# Patient Record
Sex: FEMALE | Race: WHITE | HISPANIC OR LATINO | Employment: FULL TIME | URBAN - METROPOLITAN AREA
[De-identification: names, ages, dates, MRNs, and addresses within clinical notes are randomized per-mention and may not be internally consistent; named-entity substitution may affect disease eponyms.]

---

## 2017-01-12 ENCOUNTER — TELEPHONE (OUTPATIENT)
Dept: INTERNAL MEDICINE | Facility: MEDICAL CENTER | Age: 37
End: 2017-01-12

## 2017-01-12 DIAGNOSIS — M19.90 ARTHRITIS: ICD-10-CM

## 2017-02-16 ENCOUNTER — TELEPHONE (OUTPATIENT)
Dept: INTERNAL MEDICINE | Facility: MEDICAL CENTER | Age: 37
End: 2017-02-16

## 2017-02-16 NOTE — TELEPHONE ENCOUNTER
Rheumatology workup:  I discussed in detail with Dr. Sorenson, the rheumatologist [phone] about patient's complaints and chronic joint pain.  Dr. Sorenson's office will initiate necessary workup, including any genetic testing or further labs or imaging needed.  Dr. Sorenson's  will coordinate with us about ay further plan, and likely referral to [facility].

## 2017-02-17 ENCOUNTER — TELEPHONE (OUTPATIENT)
Dept: INTERNAL MEDICINE | Facility: MEDICAL CENTER | Age: 37
End: 2017-02-17

## 2017-02-17 NOTE — TELEPHONE ENCOUNTER
Patient's case discussed with rheumatologist Dr. wetzel.  Rheumatologist reviewed patient's chart and did not recommend any further rheumatological workup.  I will follow this message to PCP Dr. Adamson.

## 2017-02-21 ENCOUNTER — SLEEP CENTER VISIT (OUTPATIENT)
Dept: SLEEP MEDICINE | Facility: MEDICAL CENTER | Age: 37
End: 2017-02-21
Payer: COMMERCIAL

## 2017-02-21 VITALS
RESPIRATION RATE: 16 BRPM | HEART RATE: 65 BPM | BODY MASS INDEX: 20.4 KG/M2 | DIASTOLIC BLOOD PRESSURE: 86 MMHG | WEIGHT: 130 LBS | HEIGHT: 67 IN | SYSTOLIC BLOOD PRESSURE: 110 MMHG | TEMPERATURE: 97.5 F | OXYGEN SATURATION: 95 %

## 2017-02-21 DIAGNOSIS — Z72.0 TOBACCO ABUSE: ICD-10-CM

## 2017-02-21 DIAGNOSIS — F41.9 ANXIETY: ICD-10-CM

## 2017-02-21 DIAGNOSIS — F51.01 PRIMARY INSOMNIA: ICD-10-CM

## 2017-02-21 DIAGNOSIS — G47.33 OBSTRUCTIVE SLEEP APNEA: ICD-10-CM

## 2017-02-21 PROCEDURE — 99204 OFFICE O/P NEW MOD 45 MIN: CPT | Performed by: INTERNAL MEDICINE

## 2017-02-21 RX ORDER — ALPRAZOLAM 0.25 MG/1
TABLET ORAL
Refills: 2 | COMMUNITY
Start: 2016-12-20 | End: 2017-04-03

## 2017-02-21 NOTE — PATIENT INSTRUCTIONS
"This lady comes in at the request of her primary care doctor for evaluation of a complex sleeping problems that include fibromyalgia prior diagnosis, insomnia, vivid dreams, not awakening refreshed. She uses trazodone to help sleep. She indicates that she sleeps \"bunched up\". She has a prior history of significant alcohol use, up to 15 drinks per day a number of years ago but has stopped that. Presently she does smoke one half pack per day, with slender build I explained that that would interrupt her sleep quality. She awakens in the mornings around 3 or 4 AM with anxiety and depression.    We will look at her sleep pattern, I suspect the only treatable event we might discover with the restless legs, but I strongly urged better sleep hygiene, avoiding tobacco, avoiding alcohol, and with regard to her insomnia she may need more specific intervention. At present she uses trazodone, limits its dosing to prevent excessive daytime sleepiness, but her sleep quality is so poor that she never awakens refreshed. Anxiety and depression seem to be strong components. Leg movements are noted.    We will look at her sleep pattern, determine if there are any components that we can adjust or correct, strongly urged better sleep hygiene, then decide if she needs to see Lolis velazquez, PhD for insomnia   "

## 2017-02-21 NOTE — MR AVS SNAPSHOT
"        Florencia Valdez Sid   2017 2:40 PM   Sleep Center Visit   MRN: [id number]    Department:  [facility]   Dept Phone:  [phone]    Description:  Female : 1980   Provider:  Milly Dey M.D.           Reason for Visit     New Patient           Allergies as of 2017     Allergen Noted Reactions    Codeine 2008   Vomiting    Latex 2008   Rash    Sulfa Drugs 2016         You were diagnosed with     Primary insomnia   [310623]       Obstructive sleep apnea   [915819]       Anxiety   [216620]       Tobacco abuse   [041245]         Vital Signs     Blood Pressure Pulse Temperature Respirations Height Weight    110/86 mmHg 65 36.4 °C (97.5 °F) 16 1.702 m (5' 7.01\") 58.968 kg (130 lb)    Body Mass Index Oxygen Saturation Smoking Status             20.36 kg/m2 95% Current Every Day Smoker         Basic Information     Date Of Birth Sex Race Ethnicity Preferred Language    1980 Female Other Non- English      Your appointments     2017  8:00 PM   Sleep Study Diagnostic with SLEEP TECH   [facility] (--)    [address]  [place] [id number]   [phone]            2017 11:20 AM   Follow UP with [facility] (--)    [address]  [place] [id number]   [phone]              Problem List              ICD-10-CM Priority Class Noted - Resolved    Overdose T50.901A   2016 - Present    Suicidal intent R45.851 High  2016 - Present    Hypokalemia E87.6   2016 - Present    Hypomagnesemia E83.42   2016 - Present      Health Maintenance        Date Due Completion Dates    IMM DTaP/Tdap/Td Vaccine (1 - Tdap) 2000 ---    PAP SMEAR 2002 ---            Current Immunizations     Influenza Vaccine Adult HD 2016      Below and/or attached are the medications your provider expects you to take. Review all of your home medications and " newly ordered medications with your provider and/or pharmacist. Follow medication instructions as directed by your provider and/or pharmacist. Please keep your medication list with you and share with your provider. Update the information when medications are discontinued, doses are changed, or new medications (including over-the-counter products) are added; and carry medication information at all times in the event of emergency situations     Allergies:  CODEINE - Vomiting     LATEX - Rash     SULFA DRUGS - (reactions not documented)               Medications  Valid as of: February 21, 2017 -  3:39 PM    Generic Name Brand Name Tablet Size Instructions for use    Acetaminophen (Tab) TYLENOL 325 MG Take 2 Tabs by mouth every four hours as needed.        Albuterol Sulfate (Aero Soln) albuterol 108 (90 BASE) MCG/ACT Inhale 2 Puffs by mouth every 6 hours as needed for Shortness of Breath.        ALPRAZolam (Tab) XANAX 0.25 MG TAKE 1 TABLET BY MOUTH ONCE A DAY AT BEDTIME AS NEEDED FOR FIBROMYALGIA.        Divalproex Sodium (TABLET SR 24 HR) DEPAKOTE  MG Take 1 Tab by mouth every day.        Famotidine (Tab) PEPCID 20 MG Take 1 Tab by mouth 2 Times a Day.        Fluticasone Propionate (Suspension) FLONASE 50 MCG/ACT Spray 1 Spray in nose every day.        Gabapentin (Cap) NEURONTIN 300 MG Take 1 Cap by mouth 3 times a day.        Ibuprofen (Tab) MOTRIN 800 MG Take 1 Tab by mouth every 8 hours as needed.        Lidocaine (Patch) LIDODERM 5 % Apply 1 Patch to skin as directed every 24 hours.        Ondansetron HCl (Tab) ZOFRAN 4 MG TAKE 1 TAB BY MOUTH EVERY 6 HOURS.        Thiamine HCl (Tab) THIAMINE 100 MG Take 1 Tab by mouth every day.        TiZANidine HCl (Tab) ZANAFLEX 4 MG Take 1 Tab by mouth every 6 hours as needed.        TraZODone HCl (Tab) DESYREL 50 MG TAKE 3 TABS BY MOUTH EVERY BEDTIME.        Venlafaxine HCl (Tab) EFFEXOR 75 MG Take 75 mg by mouth 2 Times a Day.        .                 Medicines  "prescribed today were sent to:     [facility]/PHARMACY #[id number] - [place], [place] - [address]    [address] [place] [address]    Phone: [phone] Fax: [phone]    Open 24 Hours?: No      Medication refill instructions:       If your prescription bottle indicates you have medication refills left, it is not necessary to call your provider’s office. Please contact your pharmacy and they will refill your medication.    If your prescription bottle indicates you do not have any refills left, you may request refills at any time through one of the following ways: The online [employer] system (except Urgent Care), by calling your provider’s office, or by asking your pharmacy to contact your provider’s office with a refill request. Medication refills are processed only during regular business hours and may not be available until the next business day. Your provider may request additional information or to have a follow-up visit with you prior to refilling your medication.   *Please Note: Medication refills are assigned a new Rx number when refilled electronically. Your pharmacy may indicate that no refills were authorized even though a new prescription for the same medication is available at the pharmacy. Please request the medicine by name with the pharmacy before contacting your provider for a refill.        Your To Do List     Future Labs/Procedures Complete By Expires    POLYSOMNOGRAPHY, 4 OR MORE  As directed 2/21/2018      Instructions    This lady comes in at the request of her primary care doctor for evaluation of a complex sleeping problems that include fibromyalgia prior diagnosis, insomnia, vivid dreams, not awakening refreshed. She uses trazodone to help sleep. She indicates that she sleeps \"bunched up\". She has a prior history of significant alcohol use, up to 15 drinks per day a number of years ago but has stopped that. Presently she does smoke one half pack per day, with slender build I explained that that would " interrupt her sleep quality. She awakens in the mornings around 3 or 4 AM with anxiety and depression.    We will look at her sleep pattern, I suspect the only treatable event we might discover with the restless legs, but I strongly urged better sleep hygiene, avoiding tobacco, avoiding alcohol, and with regard to her insomnia she may need more specific intervention. At present she uses trazodone, limits its dosing to prevent excessive daytime sleepiness, but her sleep quality is so poor that she never awakens refreshed. Anxiety and depression seem to be strong components. Leg movements are noted.    We will look at her sleep pattern, determine if there are any components that we can adjust or correct, strongly urged better sleep hygiene, then decide if she needs to see Lolis velazquez, PhD for insomnia           MyChart Status: Patient Declined

## 2017-02-21 NOTE — PROGRESS NOTES
"Florencia Lau is a 37 y.o. female here for poor quality sleep with insomnia and possible leg movement disorder. Patient was referred by her primary care doctor.    History of Present Illness:      This lady comes in at the request of her primary care doctor for evaluation of a complex sleeping problems that include fibromyalgia prior diagnosis, insomnia, vivid dreams, not awakening refreshed. She uses trazodone to help sleep. She indicates that she sleeps \"bunched up\". She has a prior history of significant alcohol use, up to 15 drinks per day a number of years ago but has stopped that. Presently she does smoke one half pack per day, with slender build I explained that that would interrupt her sleep quality. She awakens in the mornings around 3 or 4 AM with anxiety and depression.    We will look at her sleep pattern, I suspect the only treatable event we might discover with the restless legs, but I strongly urged better sleep hygiene, avoiding tobacco, avoiding alcohol, and with regard to her insomnia she may need more specific intervention. At present she uses trazodone, limits its dosing to prevent excessive daytime sleepiness, but her sleep quality is so poor that she never awakens refreshed. Anxiety and depression seem to be strong components. Leg movements are noted.    We will look at her sleep pattern, determine if there are any components that we can adjust or correct, strongly urged better sleep hygiene, then decide if she needs to see Lolis velazquez, PhD for insomnia     Constitutional ROS: No unexpected change in weight, No unexplained fevers, sweats, or chills  Eyes: No change in vision or blurring or double vision  Mouth/Throat ROS: No sore throat, No recent change in voice or hoarseness  Pulmonary ROS: See present history for pertinent positives  Cardiovascular ROS: No chest pain  Gastrointestinal ROS: No abdominal pain, No abdominal bloating or early satiety  Musculoskeletal/Extremities ROS: No " clubbing, No cyanosis; complaints of muscle spasms intermittent  Hematologic/Lymphatic ROS: No abnormal bleeding  Neurologic ROS: No weakness  Psychiatric ROS: Anxiety and depression  Allergic/Immunologic: No rhinitis or urticaria     Current Outpatient Prescriptions   Medication Sig Dispense Refill   • gabapentin (NEURONTIN) 300 MG Cap Take 1 Cap by mouth 3 times a day. 90 Cap 2   • trazodone (DESYREL) 50 MG Tab TAKE 3 TABS BY MOUTH EVERY BEDTIME. 90 Tab 1   • acetaminophen (TYLENOL) 325 MG Tab Take 2 Tabs by mouth every four hours as needed. 240 Tab 2   • divalproex ER (DEPAKOTE ER) 250 MG TABLET SR 24 HR Take 1 Tab by mouth every day. 30 Tab 3   • tizanidine (ZANAFLEX) 4 MG Tab Take 1 Tab by mouth every 6 hours as needed. 30 Tab 3   • ibuprofen (MOTRIN) 800 MG Tab Take 1 Tab by mouth every 8 hours as needed. 30 Tab 3   • famotidine (PEPCID) 20 MG Tab Take 1 Tab by mouth 2 Times a Day. 60 Tab 11   • venlafaxine (EFFEXOR) 75 MG Tab Take 75 mg by mouth 2 Times a Day.     • albuterol 108 (90 BASE) MCG/ACT Aero Soln inhalation aerosol Inhale 2 Puffs by mouth every 6 hours as needed for Shortness of Breath. 8.5 g 3   • alprazolam (XANAX) 0.25 MG Tab TAKE 1 TABLET BY MOUTH ONCE A DAY AT BEDTIME AS NEEDED FOR FIBROMYALGIA.  2   • thiamine (THIAMINE) 100 MG tablet Take 1 Tab by mouth every day. 30 Tab    • ondansetron (ZOFRAN) 4 MG Tab tablet TAKE 1 TAB BY MOUTH EVERY 6 HOURS. 90 Tab 2   • fluticasone (FLONASE) 50 MCG/ACT nasal spray Spray 1 Spray in nose every day.     • lidocaine (LIDODERM) 5 % Patch Apply 1 Patch to skin as directed every 24 hours.       No current facility-administered medications for this visit.       Social History   Substance Use Topics   • Smoking status: Current Every Day Smoker -- 1.00 packs/day     Types: Cigarettes   • Smokeless tobacco: Never Used      Comment: 1 PK per day   • Alcohol Use: No        Past Medical History   Diagnosis Date   • Fibromyalgia    • TMJ syndrome    • Depression    •  "Anxiety    • Panic attacks    • Insomnia    • Neck pain    • Shoulder pain    • Back pain        Past Surgical History   Procedure Laterality Date   • Other       neck abscess   • Tonsillectomy     • Tubal ligation         Allergies: Codeine; Latex; and Sulfa drugs    Family History   Problem Relation Age of Onset   • Diabetes     • Allergies       RA   • Other       DIVERTICULITIS, LUPUS, DDD, FM       Physical Examination    Filed Vitals:    02/21/17 1437   Height: 1.702 m (5' 7.01\")   Weight: 58.968 kg (130 lb)   Weight % change since last entry.: 0 %   BP: 110/86   Pulse: 65   BMI (Calculated): 20.36   Resp: 16   Temp: 36.4 °C (97.5 °F)   Neck circumference: 19       General Appearance: alert, no distress  Skin: Skin color, texture, turgor normal. No rashes or lesions.  Eyes: negative  Oropharynx: Lips, mucosa, and tongue normal. Teeth and gums normal. Oropharynx moist and without lesion  Lungs: positive findings: Clear by exam  Heart: negative. RRR without murmur, gallop, or rubs.  No ectopy.  Abdomen: Abdomen soft, non-tender. BS normal. No masses,  No organomegaly  Extremities: Extremities normal. No deformities, edema, or skin discoloration  Peripheral Pulses: Normal  Neurologic: intact  Oropharynx without pathology    II (soft palate, uvula, fauces visible)    Imaging: None    PFTS: None      Assessment and Plan  1. Primary insomnia  - POLYSOMNOGRAPHY, 4 OR MORE; Future    2. Obstructive sleep apnea  - POLYSOMNOGRAPHY, 4 OR MORE; Future    3. Anxiety    4. Tobacco abuse      This lady comes in at the request of her primary care doctor for evaluation of a complex sleeping problems that include fibromyalgia prior diagnosis, insomnia, vivid dreams, not awakening refreshed. She uses trazodone to help sleep. She indicates that she sleeps \"bunched up\". She has a prior history of significant alcohol use, up to 15 drinks per day a number of years ago but has stopped that. Presently she does smoke one half pack per " day, with slender build I explained that that would interrupt her sleep quality. She awakens in the mornings around 3 or 4 AM with anxiety and depression.    We will look at her sleep pattern, I suspect the only treatable event we might discover with the restless legs, but I strongly urged better sleep hygiene, avoiding tobacco, avoiding alcohol, and with regard to her insomnia she may need more specific intervention. At present she uses trazodone, limits its dosing to prevent excessive daytime sleepiness, but her sleep quality is so poor that she never awakens refreshed. Anxiety and depression seem to be strong components. Leg movements are noted.    We will look at her sleep pattern, determine if there are any components that we can adjust or correct, strongly urged better sleep hygiene, then decide if she needs to see Lolis velazquez, PhD for insomnia   Followup Return in about 6 weeks (around 4/4/2017) for follow up visit with sleep provider, MD.

## 2017-03-27 RX ORDER — TRAZODONE HYDROCHLORIDE 50 MG/1
TABLET ORAL
Qty: 21 TAB | Refills: 0 | Status: SHIPPED | OUTPATIENT
Start: 2017-03-27 | End: 2017-04-03 | Stop reason: SDUPTHER

## 2017-03-27 NOTE — TELEPHONE ENCOUNTER
Last seen: 12/19/116 by Dr. Adamson  Next appt: 04/03/17 with Dr. Adamson    Was the patient seen in the last year in this department? Yes   Does patient have an active prescription for medications requested? No   Received Request Via: Pharmacy

## 2017-03-28 NOTE — TELEPHONE ENCOUNTER
Aracely DALAL M.D.   Severiano Adamson D.O. [facility]   16 hours ago   (5:09 PM)    Will refill for 1 week until pcp evaluation. Per Dr. Dey note, decreased trazadone dose. (Routing comment)

## 2017-04-01 ENCOUNTER — APPOINTMENT (OUTPATIENT)
Dept: SLEEP MEDICINE | Facility: MEDICAL CENTER | Age: 37
End: 2017-04-01
Attending: INTERNAL MEDICINE
Payer: COMMERCIAL

## 2017-04-03 ENCOUNTER — OFFICE VISIT (OUTPATIENT)
Dept: INTERNAL MEDICINE | Facility: MEDICAL CENTER | Age: 37
End: 2017-04-03
Payer: COMMERCIAL

## 2017-04-03 VITALS
HEIGHT: 67 IN | HEART RATE: 72 BPM | TEMPERATURE: 97.4 F | SYSTOLIC BLOOD PRESSURE: 102 MMHG | BODY MASS INDEX: 20.34 KG/M2 | DIASTOLIC BLOOD PRESSURE: 70 MMHG | OXYGEN SATURATION: 98 % | WEIGHT: 129.6 LBS

## 2017-04-03 DIAGNOSIS — T50.902D: ICD-10-CM

## 2017-04-03 DIAGNOSIS — F51.04 PSYCHOPHYSIOLOGICAL INSOMNIA: ICD-10-CM

## 2017-04-03 DIAGNOSIS — M19.90 INFLAMMATORY ARTHRITIS: ICD-10-CM

## 2017-04-03 DIAGNOSIS — G89.4 CHRONIC PAIN SYNDROME: ICD-10-CM

## 2017-04-03 PROCEDURE — 99214 OFFICE O/P EST MOD 30 MIN: CPT | Mod: GC | Performed by: INTERNAL MEDICINE

## 2017-04-03 RX ORDER — TRAZODONE HYDROCHLORIDE 150 MG/1
150 TABLET ORAL NIGHTLY PRN
Qty: 30 TAB | Refills: 1 | Status: SHIPPED | OUTPATIENT
Start: 2017-04-03 | End: 2017-06-26 | Stop reason: SDUPTHER

## 2017-04-03 ASSESSMENT — ENCOUNTER SYMPTOMS
DOUBLE VISION: 0
VOMITING: 0
NAUSEA: 0
SHORTNESS OF BREATH: 0
NERVOUS/ANXIOUS: 1
BLURRED VISION: 1
HEARTBURN: 0
SEIZURES: 0
FEVER: 0
PHOTOPHOBIA: 0
SORE THROAT: 0
MYALGIAS: 1
CHILLS: 0
BRUISES/BLEEDS EASILY: 0
HEADACHES: 1
BACK PAIN: 1
FOCAL WEAKNESS: 0
DEPRESSION: 0
COUGH: 0
PALPITATIONS: 0
WEIGHT LOSS: 0

## 2017-04-03 ASSESSMENT — LIFESTYLE VARIABLES: SUBSTANCE_ABUSE: 0

## 2017-04-03 NOTE — MR AVS SNAPSHOT
"        Florencia Lau   4/3/2017 9:30 AM   Office Visit   MRN: [id number]    Department:  [facility] - Internal Med   Dept Phone:  [phone]    Description:  Female : 1980   Provider:  Severiano Adamson D.O.           Reason for Visit     Follow-Up follow up visit    Orders Needed labs      Allergies as of 4/3/2017     Allergen Noted Reactions    Codeine 2008   Vomiting    Latex 2008   Rash    Sulfa Drugs 2016         You were diagnosed with     Inflammatory arthritis   [742019]       Chronic pain syndrome   [338.4.ICD-9-CM]       Overdose, intentional self-harm, subsequent encounter   [7915964]       Psychophysiological insomnia   [339979]         Vital Signs     Blood Pressure Pulse Temperature Height Weight Body Mass Index    102/70 mmHg 72 36.3 °C (97.4 °F) 1.702 m (5' 7\") 58.786 kg (129 lb 9.6 oz) 20.29 kg/m2    Oxygen Saturation Smoking Status                98% Current Every Day Smoker          Basic Information     Date Of Birth Sex Race Ethnicity Preferred Language    1980 Female Other Non- English      Your appointments     2017 11:20 AM   Follow UP with DANTE Willard   [facility] (--)    [address] [place] [address] [address]  [place] [address] [phone]            2017  9:00 AM   Established Patient with Severiano Adamson D.O.   [facility] - Internal Medicine (--)    [address] [place] [address] [address]  [facility] [address] [phone]           You will be receiving a confirmation call a few days before your appointment from our automated call confirmation system.              Problem List              ICD-10-CM Priority Class Noted - Resolved    Overdose T50.901A   2016 - Present    Suicidal intent R45.851 High  2016 - Present    Hypokalemia E87.6   2016 - Present    Hypomagnesemia E83.42   2016 - Present    Inflammatory arthritis M19.90   4/3/2017 - Present    Chronic pain syndrome G89.4   " 4/3/2017 - Present    Psychophysiological insomnia F51.04   4/3/2017 - Present      Health Maintenance        Date Due Completion Dates    IMM DTaP/Tdap/Td Vaccine (1 - Tdap) 2/11/2000 ---    PAP SMEAR 2/11/2002 ---            Current Immunizations     Influenza Vaccine Adult HD 11/1/2016      Below and/or attached are the medications your provider expects you to take. Review all of your home medications and newly ordered medications with your provider and/or pharmacist. Follow medication instructions as directed by your provider and/or pharmacist. Please keep your medication list with you and share with your provider. Update the information when medications are discontinued, doses are changed, or new medications (including over-the-counter products) are added; and carry medication information at all times in the event of emergency situations     Allergies:  CODEINE - Vomiting     LATEX - Rash     SULFA DRUGS - (reactions not documented)               Medications  Valid as of: April 03, 2017 - 10:35 AM    Generic Name Brand Name Tablet Size Instructions for use    Acetaminophen (Tab) TYLENOL 325 MG Take 2 Tabs by mouth every four hours as needed.        Albuterol Sulfate (Aero Soln) albuterol 108 (90 BASE) MCG/ACT Inhale 2 Puffs by mouth every 6 hours as needed for Shortness of Breath.        ALPRAZolam (Tab) XANAX 0.25 MG TAKE 1 TABLET BY MOUTH ONCE A DAY AT BEDTIME AS NEEDED FOR FIBROMYALGIA.        Divalproex Sodium (TABLET SR 24 HR) DEPAKOTE  MG Take 1 Tab by mouth every day.        Famotidine (Tab) PEPCID 20 MG Take 1 Tab by mouth 2 Times a Day.        Fluticasone Propionate (Suspension) FLONASE 50 MCG/ACT Spray 1 Spray in nose every day.        Gabapentin (Cap) NEURONTIN 300 MG TAKE 1 CAPSULE BY MOUTH 3 TIMES A DAY.        Ibuprofen (Tab) MOTRIN 800 MG Take 1 Tab by mouth every 8 hours as needed.        Lidocaine (Patch) LIDODERM 5 % Apply 1 Patch to skin as directed every 24 hours.        Ondansetron  HCl (Tab) ZOFRAN 4 MG TAKE 1 TAB BY MOUTH EVERY 6 HOURS.        Thiamine HCl (Tab) THIAMINE 100 MG Take 1 Tab by mouth every day.        TiZANidine HCl (Tab) ZANAFLEX 4 MG Take 1 Tab by mouth every 6 hours as needed.        TraZODone HCl (Tab) DESYREL 150 MG Take 1 Tab by mouth at bedtime as needed for Sleep.        Venlafaxine HCl (Tab) EFFEXOR 75 MG Take 75 mg by mouth 2 Times a Day.        .                 Medicines prescribed today were sent to:     [facility]/PHARMACY #[id number] - [place], [place] - [address]    [address] [place] [address]    Phone: [phone] Fax: [phone]    Open 24 Hours?: No      Medication refill instructions:       If your prescription bottle indicates you have medication refills left, it is not necessary to call your provider’s office. Please contact your pharmacy and they will refill your medication.    If your prescription bottle indicates you do not have any refills left, you may request refills at any time through one of the following ways: The online [employer] system (except Urgent Care), by calling your provider’s office, or by asking your pharmacy to contact your provider’s office with a refill request. Medication refills are processed only during regular business hours and may not be available until the next business day. Your provider may request additional information or to have a follow-up visit with you prior to refilling your medication.   *Please Note: Medication refills are assigned a new Rx number when refilled electronically. Your pharmacy may indicate that no refills were authorized even though a new prescription for the same medication is available at the pharmacy. Please request the medicine by name with the pharmacy before contacting your provider for a refill.        Referral     A referral request has been sent to our patient care coordination department. Please allow 3-5 business days for us to process this request and contact you either by phone or mail. If you do not  hear from us by the 5th business day, please call us at [phone].           [employer] Access Code: Activation code not generated  Current [employer] Status: Active          Quit Tobacco Information     Do you want to quit using tobacco?    Quitting tobacco decreases risks of cancer, heart and lung disease, increases life expectancy, improves sense of taste and smell, and increases spending money, among other benefits.    If you are thinking about quitting, we can help.  • [facility] Quit Tobacco Program: 845.254.7703  o Program occurs weekly for four weeks and includes pharmacist consultation on products to support quitting smoking or chewing tobacco. A provider referral is needed for pharmacist consultation.  • Tobacco Users Help Hotline: 8-800QUIT-NOW (815-1941) or https://[place].quitlogix.org/  o Free, confidential telephone and online coaching for [place] residents. Sessions are designed on a schedule that is convenient for you. Eligible clients receive free nicotine replacement therapy.  • Nationally: www.smokefree.gov  o Information and professional assistance to support both immediate and long-term needs as you become, and remain, a non-smoker. Smokefree.gov allows you to choose the help that best fits your needs.

## 2017-04-04 NOTE — PATIENT INSTRUCTIONS
Insomnia  Insomnia is a sleep disorder that makes it difficult to fall asleep or to stay asleep. Insomnia can cause tiredness (fatigue), low energy, difficulty concentrating, mood swings, and poor performance at work or school.   There are three different ways to classify insomnia:   · Difficulty falling asleep.  · Difficulty staying asleep.  · Waking up too early in the morning.  Any type of insomnia can be long-term (chronic) or short-term (acute). Both are common. Short-term insomnia usually lasts for three months or less. Chronic insomnia occurs at least three times a week for longer than three months.  CAUSES   Insomnia may be caused by another condition, situation, or substance, such as:  · Anxiety.  · Certain medicines.  · Gastroesophageal reflux disease (GERD) or other gastrointestinal conditions.  · Asthma or other breathing conditions.  · Restless legs syndrome, sleep apnea, or other sleep disorders.  · Chronic pain.  · Menopause. This may include hot flashes.  · Stroke.  · Abuse of alcohol, tobacco, or illegal drugs.  · Depression.  · Caffeine.    · Neurological disorders, such as Alzheimer disease.  · An overactive thyroid (hyperthyroidism).  The cause of insomnia may not be known.  RISK FACTORS  Risk factors for insomnia include:  · Gender. Women are more commonly affected than men.  · Age. Insomnia is more common as you get older.  · Stress. This may involve your professional or personal life.  · Income. Insomnia is more common in people with lower income.  · Lack of exercise.    · Irregular work schedule or night shifts.  · Travelling between different time zones.  SIGNS AND SYMPTOMS  If you have insomnia, trouble falling asleep or trouble staying asleep is the main symptom. This may lead to other symptoms, such as:  · Feeling fatigued.  · Feeling nervous about going to sleep.  · Not feeling rested in the morning.  · Having trouble concentrating.  · Feeling irritable, anxious, or depressed.  TREATMENT    Treatment for insomnia depends on the cause. If your insomnia is caused by an underlying condition, treatment will focus on addressing the condition. Treatment may also include:   · Medicines to help you sleep.  · Counseling or therapy.  · Lifestyle adjustments.  HOME CARE INSTRUCTIONS   · Take medicines only as directed by your health care provider.  · Keep regular sleeping and waking hours. Avoid naps.  · Keep a sleep diary to help you and your health care provider figure out what could be causing your insomnia. Include:    ¨ When you sleep.  ¨ When you wake up during the night.  ¨ How well you sleep.    ¨ How rested you feel the next day.  ¨ Any side effects of medicines you are taking.  ¨ What you eat and drink.    · Make your bedroom a comfortable place where it is easy to fall asleep:  ¨ Put up shades or special blackout curtains to block light from outside.  ¨ Use a white noise machine to block noise.  ¨ Keep the temperature cool.    · Exercise regularly as directed by your health care provider. Avoid exercising right before bedtime.  · Use relaxation techniques to manage stress. Ask your health care provider to suggest some techniques that may work well for you. These may include:  ¨ Breathing exercises.  ¨ Routines to release muscle tension.  ¨ Visualizing peaceful scenes.  · Cut back on alcohol, caffeinated beverages, and cigarettes, especially close to bedtime. These can disrupt your sleep.  · Do not overeat or eat spicy foods right before bedtime. This can lead to digestive discomfort that can make it hard for you to sleep.  · Limit screen use before bedtime. This includes:  ¨ Watching TV.  ¨ Using your smartphone, tablet, and computer.  · Stick to a routine. This can help you fall asleep faster. Try to do a quiet activity, brush your teeth, and go to bed at the same time each night.  · Get out of bed if you are still awake after 15 minutes of trying to sleep. Keep the lights down, but try reading or  doing a quiet activity. When you feel sleepy, go back to bed.  · Make sure that you drive carefully. Avoid driving if you feel very sleepy.  · Keep all follow-up appointments as directed by your health care provider. This is important.  SEEK MEDICAL CARE IF:   · You are tired throughout the day or have trouble in your daily routine due to sleepiness.  · You continue to have sleep problems or your sleep problems get worse.  SEEK IMMEDIATE MEDICAL CARE IF:   · You have serious thoughts about hurting yourself or someone else.     This information is not intended to replace advice given to you by your health care provider. Make sure you discuss any questions you have with your health care provider.     Document Released: 12/15/2001 Document Revised: 01/08/2016 Document Reviewed: 09/18/2015  Elsevier Interactive Patient Education ©2016 [employer]

## 2017-04-04 NOTE — PROGRESS NOTES
Established Patient    Florencia presents today with the following:    CC: Inflammatory arthritis, chronic pain, recurrent hypertonicity    HPI: This patient is a 37-year-old female with past medical history of chronic pain syndrome, anxiety/depression with previous suicide attempt, recurrent episodic hypertonicity reporting to clinic today for routine follow-up of chronic conditions as follows:    Inflammatory arthritis:  Patient recently referred to rheumatology for further workup, however her referral was declined. The patient believes she may have chronic inflammatory response syndrome, and requests specific labs be drawn for diagnosis (VIP, complement levels, HLA-DRB/DQB. She also states that visual acuity test can be used as partial of the diagnosis, and is currently pursuing an appointment with optometry. She reports limited relief with gabapentin and acetaminophen, however she understands that due to her history of suicide attempt by overdose she is not a candidate for controlled medications. She would like referral to pain management and chiropractic for possible other pain relief modalities.    Psychophysiologic insomnia:  The patient reports complete inability to fall asleep due to anxiety as well as pain from inflammatory arthritis and hypertonic state which is idiopathic. She has been on trazodone 150 mg nightly for many years and requests refill.    Patient Active Problem List    Diagnosis Date Noted   • Suicidal intent 11/06/2016     Priority: High   • Inflammatory arthritis 04/03/2017   • Chronic pain syndrome 04/03/2017   • Psychophysiological insomnia 04/03/2017   • Hypokalemia 11/07/2016   • Hypomagnesemia 11/07/2016   • Overdose 11/06/2016       Current Outpatient Prescriptions   Medication Sig Dispense Refill   • trazodone (DESYREL) 150 MG Tab Take 1 Tab by mouth at bedtime as needed for Sleep. 30 Tab 1   • gabapentin (NEURONTIN) 300 MG Cap TAKE 1 CAPSULE BY MOUTH 3 TIMES A DAY. 90 Cap 1   •  "acetaminophen (TYLENOL) 325 MG Tab Take 2 Tabs by mouth every four hours as needed. 240 Tab 2   • divalproex ER (DEPAKOTE ER) 250 MG TABLET SR 24 HR Take 1 Tab by mouth every day. 30 Tab 3   • tizanidine (ZANAFLEX) 4 MG Tab Take 1 Tab by mouth every 6 hours as needed. 30 Tab 3   • ibuprofen (MOTRIN) 800 MG Tab Take 1 Tab by mouth every 8 hours as needed. 30 Tab 3   • venlafaxine (EFFEXOR) 75 MG Tab Take 75 mg by mouth 2 Times a Day.     • ondansetron (ZOFRAN) 4 MG Tab tablet TAKE 1 TAB BY MOUTH EVERY 6 HOURS. 90 Tab 2   • albuterol 108 (90 BASE) MCG/ACT Aero Soln inhalation aerosol Inhale 2 Puffs by mouth every 6 hours as needed for Shortness of Breath. 8.5 g 3     No current facility-administered medications for this visit.       ROS: As per HPI. Additional pertinent symptoms as noted below.  Review of Systems   Constitutional: Negative for fever, chills and weight loss.   HENT: Negative for sore throat and tinnitus.    Eyes: Positive for blurred vision. Negative for double vision and photophobia.   Respiratory: Negative for cough and shortness of breath.    Cardiovascular: Negative for chest pain, palpitations and leg swelling.   Gastrointestinal: Negative for heartburn, nausea and vomiting.   Genitourinary: Negative for dysuria, urgency and frequency.   Musculoskeletal: Positive for myalgias, back pain and joint pain.   Skin: Negative for itching and rash.   Neurological: Positive for headaches. Negative for focal weakness and seizures.   Endo/Heme/Allergies: Negative for environmental allergies. Does not bruise/bleed easily.   Psychiatric/Behavioral: Negative for depression, suicidal ideas and substance abuse. The patient is nervous/anxious.        /70 mmHg  Pulse 72  Temp(Src) 36.3 °C (97.4 °F)  Ht 1.702 m (5' 7\")  Wt 58.786 kg (129 lb 9.6 oz)  BMI 20.29 kg/m2  SpO2 98%    Physical Exam   Constitutional: She is oriented to person, place, and time and well-developed, well-nourished, and in no " "distress. No distress.   HENT:   Head: Normocephalic and atraumatic.   Right Ear: External ear normal.   Left Ear: External ear normal.   Eyes: Conjunctivae and EOM are normal. Pupils are equal, round, and reactive to light.   Neck: Normal range of motion. Neck supple. No tracheal deviation present. No thyromegaly present.   Cardiovascular: Normal rate, regular rhythm and normal heart sounds.    Pulmonary/Chest: Breath sounds normal. No respiratory distress. She has no wheezes.   Abdominal: Soft. Bowel sounds are normal. She exhibits no distension. There is no tenderness.   Musculoskeletal: Normal range of motion. She exhibits no edema or tenderness.   Range of motion limited by pain   Neurological: She is alert and oriented to person, place, and time. GCS score is 15.   Skin: Skin is warm and dry. She is not diaphoretic.   Psychiatric: Mood, memory, affect and judgment normal.         Assessment and Plan    1. Inflammatory arthritis  2. Chronic pain syndrome  May be attributable to chronic inflammatory response syndrome as described and patient documents. Sedimentation rate and anticardiolipin antibodies previously elevated. Previously referred to rheumatology, however referral was declined as rheumatology felt this patient should be sent to tertiary center ([place]). We have been unable to get her into [place] as of yet.  -We will initiate testing for possible chronic inflammatory response syndrome  -Labs: VIP, HLA DQB/DRB, complement levels  -We will refer to pain management and chiropractic for possible alternate modalities of pain control as the patient has history of overdose and is unable to receive controlled medications from this office.     3. Overdose, intentional self-harm, subsequent encounter  Patient denies depression as well as suicidal ideation currently. She states \"I really hate that part of my life, wish it had never happened\". She is disappointed that she is unable to receive the pain " "medications which she believe she needs, however she understands and is amenable to referral to pain management and chiropractic for alternate modalities.  -I have counseled her extensively on the need to call for help immediately should suicidal ideation be present, she verbalizes understanding and agreement.    4. Psychophysiological insomnia  Patient has been on trazodone 150 mg daily at bedtime for \"many years\", does not believe she can sleep without it. We did refer her for sleep evaluation, however this has not been completed yet. Although trazodone is not a controlled substance, the patient has been counseled that it is sedating and she should use it carefully and not take it every night if possible.  -Refill written      Followup: Return in about 10 weeks (around 6/12/2017) for Long.      Signed by: Severiano Adamson D.O.  "

## 2017-05-19 NOTE — TELEPHONE ENCOUNTER
Last seen: 04/03/17 by Dr. Adamson  Next appt: 06/12/17 with Dr. Adamson    Was the patient seen in the last year in this department? Yes   Does patient have an active prescription for medications requested? No   Received Request Via: Pharmacy

## 2017-05-20 RX ORDER — TIZANIDINE 4 MG/1
TABLET ORAL
Qty: 90 TAB | Refills: 0 | Status: SHIPPED | OUTPATIENT
Start: 2017-05-20 | End: 2017-06-26 | Stop reason: SDUPTHER

## 2017-05-22 RX ORDER — VENLAFAXINE 75 MG/1
TABLET ORAL
Qty: 60 TAB | Refills: 5 | Status: SHIPPED | OUTPATIENT
Start: 2017-05-22 | End: 2018-06-12

## 2017-05-22 NOTE — TELEPHONE ENCOUNTER
Was the patient seen in the last year in this department? Yes    Last seen: 04/03/17 by Dr. Adamson  Next appt: 06/12/17 with Dr. Adamson      Does patient have an active prescription for medications requested? No     Received Request Via: Pharmacy

## 2017-05-30 RX ORDER — DIVALPROEX SODIUM 250 MG/1
TABLET, EXTENDED RELEASE ORAL
Qty: 30 TAB | Refills: 3 | OUTPATIENT
Start: 2017-05-30

## 2017-06-12 ENCOUNTER — APPOINTMENT (OUTPATIENT)
Dept: SLEEP MEDICINE | Facility: MEDICAL CENTER | Age: 37
End: 2017-06-12
Payer: COMMERCIAL

## 2017-06-27 NOTE — TELEPHONE ENCOUNTER
Was the patient seen in the last year in this department? Yes    Last seen: 04/03/17 by Dr. Juan Diego Pruett appt: NONE      Does patient have an active prescription for medications requested? No     Received Request Via: Pharmacy

## 2017-06-28 RX ORDER — DIVALPROEX SODIUM 250 MG/1
TABLET, EXTENDED RELEASE ORAL
Qty: 30 TAB | Refills: 3 | Status: SHIPPED | OUTPATIENT
Start: 2017-06-28 | End: 2017-09-13 | Stop reason: SDUPTHER

## 2017-06-28 RX ORDER — TRAZODONE HYDROCHLORIDE 150 MG/1
TABLET ORAL
Qty: 30 TAB | Refills: 1 | Status: SHIPPED | OUTPATIENT
Start: 2017-06-28 | End: 2018-09-02

## 2017-06-28 RX ORDER — TIZANIDINE 4 MG/1
TABLET ORAL
Qty: 30 TAB | Refills: 3 | Status: SHIPPED | OUTPATIENT
Start: 2017-06-28 | End: 2018-09-02

## 2017-09-06 DIAGNOSIS — M46.90 INFLAMMATION AND STIFFENING OF SPINE (HCC): ICD-10-CM

## 2017-09-06 RX ORDER — IBUPROFEN 800 MG/1
TABLET ORAL
Qty: 30 TAB | Refills: 0 | Status: SHIPPED | OUTPATIENT
Start: 2017-09-06 | End: 2017-10-05 | Stop reason: SDUPTHER

## 2017-09-06 NOTE — TELEPHONE ENCOUNTER
Last seen: 04/03/17 by   Next appt: None    Was the patient seen in the last year in this department? Yes   Does patient have an active prescription for medications requested? No   Received Request Via: Pharmacy

## 2017-09-13 RX ORDER — DIVALPROEX SODIUM 250 MG/1
250 TABLET, EXTENDED RELEASE ORAL
Qty: 30 TAB | Refills: 2 | Status: SHIPPED | OUTPATIENT
Start: 2017-09-13 | End: 2017-09-14 | Stop reason: SDUPTHER

## 2017-09-13 NOTE — TELEPHONE ENCOUNTER
Last seen: 04/03/17 by Dr. Adamson  Next appt: None     Was the patient seen in the last year in this department? Yes   Does patient have an active prescription for medications requested? No   Received Request Via: Pharmacy

## 2017-09-14 RX ORDER — DIVALPROEX SODIUM 250 MG/1
250 TABLET, EXTENDED RELEASE ORAL
Qty: 90 TAB | Refills: 0 | Status: SHIPPED | OUTPATIENT
Start: 2017-09-14 | End: 2018-06-12

## 2017-10-05 DIAGNOSIS — M46.90 INFLAMMATION AND STIFFENING OF SPINE (HCC): ICD-10-CM

## 2017-10-08 RX ORDER — IBUPROFEN 800 MG/1
TABLET ORAL
Qty: 30 TAB | Refills: 0 | Status: SHIPPED | OUTPATIENT
Start: 2017-10-08 | End: 2018-09-02

## 2017-10-26 ENCOUNTER — NON-PROVIDER VISIT (OUTPATIENT)
Dept: URGENT CARE | Facility: CLINIC | Age: 37
End: 2017-10-26

## 2017-10-26 DIAGNOSIS — Z02.1 PRE-EMPLOYMENT DRUG SCREENING: ICD-10-CM

## 2017-10-26 LAB
AMP AMPHETAMINE: NORMAL
COC COCAINE: NORMAL
INT CON NEG: NORMAL
INT CON POS: NORMAL
MET METHAMPHETAMINES: NORMAL
OPI OPIATES: NORMAL
PCP PHENCYCLIDINE: NORMAL
POC DRUG COMMENT 753798-OCCUPATIONAL HEALTH: NEGATIVE
THC: NORMAL

## 2017-10-26 PROCEDURE — 80305 DRUG TEST PRSMV DIR OPT OBS: CPT | Performed by: PHYSICIAN ASSISTANT

## 2017-10-30 ENCOUNTER — HOSPITAL ENCOUNTER (EMERGENCY)
Facility: MEDICAL CENTER | Age: 37
End: 2017-10-31
Attending: EMERGENCY MEDICINE
Payer: MEDICAID

## 2017-10-30 DIAGNOSIS — N30.01 ACUTE CYSTITIS WITH HEMATURIA: ICD-10-CM

## 2017-10-30 LAB
ALBUMIN SERPL BCP-MCNC: 4.5 G/DL (ref 3.2–4.9)
ALBUMIN/GLOB SERPL: 1.6 G/DL
ALP SERPL-CCNC: 53 U/L (ref 30–99)
ALT SERPL-CCNC: 7 U/L (ref 2–50)
ANION GAP SERPL CALC-SCNC: 6 MMOL/L (ref 0–11.9)
APPEARANCE UR: CLEAR
AST SERPL-CCNC: 16 U/L (ref 12–45)
BACTERIA #/AREA URNS HPF: ABNORMAL /HPF
BASOPHILS # BLD AUTO: 0.5 % (ref 0–1.8)
BASOPHILS # BLD: 0.04 K/UL (ref 0–0.12)
BILIRUB SERPL-MCNC: 0.3 MG/DL (ref 0.1–1.5)
BILIRUB UR QL STRIP.AUTO: NEGATIVE
BUN SERPL-MCNC: 12 MG/DL (ref 8–22)
CALCIUM SERPL-MCNC: 8.8 MG/DL (ref 8.5–10.5)
CHLORIDE SERPL-SCNC: 104 MMOL/L (ref 96–112)
CO2 SERPL-SCNC: 24 MMOL/L (ref 20–33)
COLOR UR: YELLOW
CREAT SERPL-MCNC: 0.68 MG/DL (ref 0.5–1.4)
CULTURE IF INDICATED INDCX: YES UA CULTURE
EOSINOPHIL # BLD AUTO: 0.18 K/UL (ref 0–0.51)
EOSINOPHIL NFR BLD: 2.4 % (ref 0–6.9)
EPI CELLS #/AREA URNS HPF: ABNORMAL /HPF
ERYTHROCYTE [DISTWIDTH] IN BLOOD BY AUTOMATED COUNT: 41.4 FL (ref 35.9–50)
GFR SERPL CREATININE-BSD FRML MDRD: >60 ML/MIN/1.73 M 2
GLOBULIN SER CALC-MCNC: 2.8 G/DL (ref 1.9–3.5)
GLUCOSE SERPL-MCNC: 83 MG/DL (ref 65–99)
GLUCOSE UR STRIP.AUTO-MCNC: NEGATIVE MG/DL
HCT VFR BLD AUTO: 37.6 % (ref 37–47)
HGB BLD-MCNC: 12.8 G/DL (ref 12–16)
HYALINE CASTS #/AREA URNS LPF: ABNORMAL /LPF
IMM GRANULOCYTES # BLD AUTO: 0.03 K/UL (ref 0–0.11)
IMM GRANULOCYTES NFR BLD AUTO: 0.4 % (ref 0–0.9)
KETONES UR STRIP.AUTO-MCNC: 15 MG/DL
LEUKOCYTE ESTERASE UR QL STRIP.AUTO: ABNORMAL
LIPASE SERPL-CCNC: 43 U/L (ref 11–82)
LYMPHOCYTES # BLD AUTO: 1.95 K/UL (ref 1–4.8)
LYMPHOCYTES NFR BLD: 25.6 % (ref 22–41)
MCH RBC QN AUTO: 31.5 PG (ref 27–33)
MCHC RBC AUTO-ENTMCNC: 34 G/DL (ref 33.6–35)
MCV RBC AUTO: 92.6 FL (ref 81.4–97.8)
MICRO URNS: ABNORMAL
MONOCYTES # BLD AUTO: 0.5 K/UL (ref 0–0.85)
MONOCYTES NFR BLD AUTO: 6.6 % (ref 0–13.4)
NEUTROPHILS # BLD AUTO: 4.91 K/UL (ref 2–7.15)
NEUTROPHILS NFR BLD: 64.5 % (ref 44–72)
NITRITE UR QL STRIP.AUTO: NEGATIVE
NRBC # BLD AUTO: 0 K/UL
NRBC BLD AUTO-RTO: 0 /100 WBC
PH UR STRIP.AUTO: 7 [PH]
PLATELET # BLD AUTO: 270 K/UL (ref 164–446)
PMV BLD AUTO: 11.1 FL (ref 9–12.9)
POTASSIUM SERPL-SCNC: 4.8 MMOL/L (ref 3.6–5.5)
PROT SERPL-MCNC: 7.3 G/DL (ref 6–8.2)
PROT UR QL STRIP: NEGATIVE MG/DL
RBC # BLD AUTO: 4.06 M/UL (ref 4.2–5.4)
RBC # URNS HPF: ABNORMAL /HPF
RBC UR QL AUTO: NEGATIVE
SODIUM SERPL-SCNC: 134 MMOL/L (ref 135–145)
SP GR UR STRIP.AUTO: 1.02
UROBILINOGEN UR STRIP.AUTO-MCNC: 1 MG/DL
WBC # BLD AUTO: 7.6 K/UL (ref 4.8–10.8)
WBC #/AREA URNS HPF: ABNORMAL /HPF

## 2017-10-30 PROCEDURE — 85025 COMPLETE CBC W/AUTO DIFF WBC: CPT

## 2017-10-30 PROCEDURE — 87086 URINE CULTURE/COLONY COUNT: CPT

## 2017-10-30 PROCEDURE — 700111 HCHG RX REV CODE 636 W/ 250 OVERRIDE (IP): Performed by: EMERGENCY MEDICINE

## 2017-10-30 PROCEDURE — 99284 EMERGENCY DEPT VISIT MOD MDM: CPT

## 2017-10-30 PROCEDURE — 700101 HCHG RX REV CODE 250: Performed by: EMERGENCY MEDICINE

## 2017-10-30 PROCEDURE — 81001 URINALYSIS AUTO W/SCOPE: CPT

## 2017-10-30 PROCEDURE — 96372 THER/PROPH/DIAG INJ SC/IM: CPT

## 2017-10-30 PROCEDURE — 36415 COLL VENOUS BLD VENIPUNCTURE: CPT

## 2017-10-30 PROCEDURE — 700102 HCHG RX REV CODE 250 W/ 637 OVERRIDE(OP): Performed by: EMERGENCY MEDICINE

## 2017-10-30 PROCEDURE — 83690 ASSAY OF LIPASE: CPT

## 2017-10-30 PROCEDURE — A9270 NON-COVERED ITEM OR SERVICE: HCPCS | Performed by: EMERGENCY MEDICINE

## 2017-10-30 PROCEDURE — 80053 COMPREHEN METABOLIC PANEL: CPT

## 2017-10-30 RX ORDER — ENEMA 19; 7 G/133ML; G/133ML
1 ENEMA RECTAL ONCE
Status: COMPLETED | OUTPATIENT
Start: 2017-10-30 | End: 2017-10-30

## 2017-10-30 RX ORDER — KETOROLAC TROMETHAMINE 30 MG/ML
30 INJECTION, SOLUTION INTRAMUSCULAR; INTRAVENOUS ONCE
Status: COMPLETED | OUTPATIENT
Start: 2017-10-30 | End: 2017-10-30

## 2017-10-30 RX ORDER — ONDANSETRON 4 MG/1
4 TABLET, ORALLY DISINTEGRATING ORAL ONCE
Status: COMPLETED | OUTPATIENT
Start: 2017-10-30 | End: 2017-10-30

## 2017-10-30 RX ORDER — DIAZEPAM 2 MG/1
2 TABLET ORAL ONCE
Status: COMPLETED | OUTPATIENT
Start: 2017-10-30 | End: 2017-10-30

## 2017-10-30 RX ADMIN — KETOROLAC TROMETHAMINE 30 MG: 30 INJECTION, SOLUTION INTRAMUSCULAR at 22:50

## 2017-10-30 RX ADMIN — ONDANSETRON 4 MG: 4 TABLET, ORALLY DISINTEGRATING ORAL at 23:07

## 2017-10-30 RX ADMIN — SODIUM PHOSPHATE 133 ML: 7; 19 ENEMA RECTAL at 23:57

## 2017-10-30 RX ADMIN — DIAZEPAM 2 MG: 2 TABLET ORAL at 23:07

## 2017-10-30 ASSESSMENT — PAIN SCALES - GENERAL: PAINLEVEL_OUTOF10: 6

## 2017-10-31 VITALS
WEIGHT: 139.55 LBS | OXYGEN SATURATION: 97 % | SYSTOLIC BLOOD PRESSURE: 110 MMHG | HEIGHT: 67 IN | TEMPERATURE: 98 F | HEART RATE: 84 BPM | BODY MASS INDEX: 21.9 KG/M2 | RESPIRATION RATE: 16 BRPM | DIASTOLIC BLOOD PRESSURE: 69 MMHG

## 2017-10-31 RX ORDER — CEPHALEXIN 250 MG/1
500 CAPSULE ORAL 4 TIMES DAILY
Qty: 24 CAP | Refills: 0 | Status: SHIPPED | OUTPATIENT
Start: 2017-10-31 | End: 2017-11-03

## 2017-10-31 NOTE — ED NOTES
Pt ambulatory to [facility]. Pt changed into gown and placed on monitor.  Agree with triage note. Pt also c/o of bloating and gas. Pt states gas and BM our very foul smelling. Pt states BM are are very small  Chart up and ready for ERP.

## 2017-10-31 NOTE — ED NOTES
Dr Hicks at bedside now for rectal exam. This RN at bedside for assistance. No stool impaction noted upon exam.

## 2017-10-31 NOTE — DISCHARGE INSTRUCTIONS
Urinary Tract Infection  A urinary tract infection (UTI) can occur any place along the urinary tract. The tract includes the kidneys, ureters, bladder, and urethra. A type of germ called bacteria often causes a UTI. UTIs are often helped with antibiotic medicine.   HOME CARE   · If given, take antibiotics as told by your doctor. Finish them even if you start to feel better.  · Drink enough fluids to keep your pee (urine) clear or pale yellow.  · Avoid tea, drinks with caffeine, and bubbly (carbonated) drinks.  · Pee often. Avoid holding your pee in for a long time.  · Pee before and after having sex (intercourse).  · Wipe from front to back after you poop (bowel movement) if you are a woman. Use each tissue only once.  GET HELP RIGHT AWAY IF:   · You have back pain.  · You have lower belly (abdominal) pain.  · You have chills.  · You feel sick to your stomach (nauseous).  · You throw up (vomit).  · Your burning or discomfort with peeing does not go away.  · You have a fever.  · Your symptoms are not better in 3 days.  MAKE SURE YOU:   · Understand these instructions.  · Will watch your condition.  · Will get help right away if you are not doing well or get worse.     This information is not intended to replace advice given to you by your health care provider. Make sure you discuss any questions you have with your health care provider.     Document Released: 06/05/2009 Document Revised: 01/08/2016 Document Reviewed: 07/18/2013  [employer] Interactive Patient Education ©2016 [employer]

## 2017-10-31 NOTE — ED NOTES
Pt discharged home. Explained discharge and medication instructions. Questions and comments addressed. Pt verbalized understanding of instructions. Pt advised to follow-up with GI consultants and PCP or return to ED for any new or worsening of symptoms. Pt is ambulating well and steady on feet. VS stable. Pt ambulatory to ED lobby now.

## 2017-10-31 NOTE — ED PROVIDER NOTES
"ED Provider Note    Scribed for Anthony Hicks M.D. by Rayray Gray. 10/30/2017, 10:23 PM.    Primary care provider: Severiano Adamson D.O.  Means of arrival: Walk-In  History obtained from: Patient  History limited by: None    CHIEF COMPLAINT  Chief Complaint   Patient presents with   • Abdominal Pain     diffuse abdominal pain x 2 weeks. states has digestive problem in a while. describes pain as sharp in nature.   • Flank Pain     bilateral flank.     HPI  Florencia Lau is a 37 y.o. female who presents to the Emergency Department complaining of a \"sharp\" diffuse abdominal pain and bilateral flank pain onset 2 weeks. The patient has been feeling constipated the past few days without major bowel movements. She also says that she has recently had irregular bowel sizes and a very \"raunchy stench\" when passing gas or having a bowel movement. The patient has been trying over-the-counter laxatives such as magnesia without relief of her symptoms. Denies pain medication use. The patient has a history of bowel issues and normally uses magnesia with relief.    REVIEW OF SYSTEMS  Positive constipation, abdominal pain, flank pain. Patient denies fever, vision change, sore throat, chest pain, shortness of breath, rashes, or neurologic deficits.    C.    PAST MEDICAL HISTORY   has a past medical history of Anxiety; Back pain; Depression; Fibromyalgia; Insomnia; Neck pain; Panic attacks; Shoulder pain; and TMJ syndrome.    SURGICAL HISTORY   has a past surgical history that includes other; tonsillectomy; and tubal ligation.    SOCIAL HISTORY  Social History   Substance Use Topics   • Smoking status: Current Every Day Smoker     Packs/day: 1.00     Types: Cigarettes   • Smokeless tobacco: Never Used      Comment: 1 PK per day   • Alcohol use No      History   Drug Use No     FAMILY HISTORY  Family History   Problem Relation Age of Onset   • Diabetes     • Allergies       RA   • Other       DIVERTICULITIS, LUPUS, " "DDD, FM     CURRENT MEDICATIONS  Reviewed.  See Encounter Summary.     ALLERGIES  Allergies   Allergen Reactions   • Codeine Vomiting   • Latex Rash   • Sulfa Drugs      PHYSICAL EXAM  VITAL SIGNS: /69   Pulse 81   Temp 36.7 °C (98 °F)   Resp 16   Ht 1.702 m (5' 7\")   Wt 63.3 kg (139 lb 8.8 oz)   SpO2 99%   BMI 21.86 kg/m²    Pulse ox interpretation: I interpret this pulse ox as normal.  Constitutional: Alert in no acute distress.  HENT: Head normocephalic, atraumatic, Bilateral external ears normal, Nose normal.  Eyes: Pupils are equal, extraocular movements intact, Conjunctiva normal, Non-icteric.   Neck: Normal range of motion, Supple, No stridor.   Lymphatic: No lymphadenopathy noted.   Cardiovascular: Regular rate and rhythm. No rubs, murmurs or gallops.  Thorax & Lungs: No acute respiratory distress, No wheezing, No increased work of breathing. Lungs clear to auscultation.  Abdomen: Soft, Moderate left lower abdominal tenderness, No rebound or guarding  Rectal: No stool found after enema.  Skin: Warm, Dry, No erythema, No rash.   Back: No bony tenderness, No CVA tenderness.   Extremities: Intact distal pulses, No edema, No tenderness, No cyanosis.    Musculoskeletal: Good range of motion in all major joints. No tenderness to palpation or major deformities noted.   Neurologic: Alert , Normal motor function, Normal sensory function, No focal deficits noted.   Psychiatric: Affect normal, Judgment normal, Mood normal.    DIAGNOSTIC STUDIES / PROCEDURES     LABS  Results for orders placed or performed during the hospital encounter of 10/30/17   CBC WITH DIFFERENTIAL   Result Value Ref Range    WBC 7.6 4.8 - 10.8 K/uL    RBC 4.06 (L) 4.20 - 5.40 M/uL    Hemoglobin 12.8 12.0 - 16.0 g/dL    Hematocrit 37.6 37.0 - 47.0 %    MCV 92.6 81.4 - 97.8 fL    MCH 31.5 27.0 - 33.0 pg    MCHC 34.0 33.6 - 35.0 g/dL    RDW 41.4 35.9 - 50.0 fL    Platelet Count 270 164 - 446 K/uL    MPV 11.1 9.0 - 12.9 fL    " Neutrophils-Polys 64.50 44.00 - 72.00 %    Lymphocytes 25.60 22.00 - 41.00 %    Monocytes 6.60 0.00 - 13.40 %    Eosinophils 2.40 0.00 - 6.90 %    Basophils 0.50 0.00 - 1.80 %    Immature Granulocytes 0.40 0.00 - 0.90 %    Nucleated RBC 0.00 /100 WBC    Neutrophils (Absolute) 4.91 2.00 - 7.15 K/uL    Lymphs (Absolute) 1.95 1.00 - 4.80 K/uL    Monos (Absolute) 0.50 0.00 - 0.85 K/uL    Eos (Absolute) 0.18 0.00 - 0.51 K/uL    Baso (Absolute) 0.04 0.00 - 0.12 K/uL    Immature Granulocytes (abs) 0.03 0.00 - 0.11 K/uL    NRBC (Absolute) 0.00 K/uL   COMP METABOLIC PANEL   Result Value Ref Range    Sodium 134 (L) 135 - 145 mmol/L    Potassium 4.8 3.6 - 5.5 mmol/L    Chloride 104 96 - 112 mmol/L    Co2 24 20 - 33 mmol/L    Anion Gap 6.0 0.0 - 11.9    Glucose 83 65 - 99 mg/dL    Bun 12 8 - 22 mg/dL    Creatinine 0.68 0.50 - 1.40 mg/dL    Calcium 8.8 8.5 - 10.5 mg/dL    AST(SGOT) 16 12 - 45 U/L    ALT(SGPT) 7 2 - 50 U/L    Alkaline Phosphatase 53 30 - 99 U/L    Total Bilirubin 0.3 0.1 - 1.5 mg/dL    Albumin 4.5 3.2 - 4.9 g/dL    Total Protein 7.3 6.0 - 8.2 g/dL    Globulin 2.8 1.9 - 3.5 g/dL    A-G Ratio 1.6 g/dL   LIPASE   Result Value Ref Range    Lipase 43 11 - 82 U/L   URINALYSIS,CULTURE IF INDICATED   Result Value Ref Range    Color Yellow     Character Clear     Specific Gravity 1.024 <1.035    Ph 7.0 5.0 - 8.0    Glucose Negative Negative mg/dL    Ketones 15 (A) Negative mg/dL    Protein Negative Negative mg/dL    Bilirubin Negative Negative    Urobilinogen, Urine 1.0 Negative    Nitrite Negative Negative    Leukocyte Esterase Small (A) Negative    Occult Blood Negative Negative    Micro Urine Req Microscopic     Culture Indicated Yes UA Culture   URINE MICROSCOPIC (W/UA)   Result Value Ref Range    WBC 5-10 (A) /hpf    RBC 2-5 (A) /hpf    Bacteria Few (A) None /hpf    Epithelial Cells Few /hpf    Hyaline Cast 0-2 /lpf   ESTIMATED GFR   Result Value Ref Range    GFR If African American >60 >60 mL/min/1.73 m 2    GFR If  Non African American >60 >60 mL/min/1.73 m 2     All labs were reviewed by me.    COURSE & MEDICAL DECISION MAKING  Pertinent Labs & Imaging studies reviewed. (See chart for details)    6:44 PM - Ordered Urine Culture, Estimated GFR, Urine Microscopic, Urinalysis, Lipase, CMP, CBC.    10:23 PM - Patient seen and examined at bedside. I discussed that the patient likely has a fecal impaction and will be treated with an enema to see if that alleviates her symptoms. She also has a UTI which can be treated with antibiotics. Patient will be treated with 30mg Toradol, 133mL Fleet Enema.    11:02 PM - Nurse informed me the patient is anxious about the enema and also would like some anti-nausea medication. Treated with 4mg Zofran, 2mg Valium.    12:14 PM - Nurse informed me the patient had a large bowel movement that was watery.    12:31 PM - Performed rectal exam with nurse chaperone and found no stool in vault. She is feeling better and can be discharged. Patient was given return precautions and she understands and verbalizes agreement.    Decision Making:  This is a 37 y.o. year old female who presents with rectal pain, and some loose bowel movements over the past couple of days. Additionally the patient describes having some sharp abdominal pains, and the patient was concerned that she has acute on chronic constipation. The patient his prescribing symptoms which seemed initially consistent with fecal impaction however on rectal exam the patient does not actually appear to have this. The patient does however have evidence of urinary tract infection on urinalysis, which will be treated with Keflex for the next 3 days. She was instructed to return here for the development of fever, oriented new or worsening symptoms.    The patient will return for new or worsening symptoms and is stable at the time of discharge.    DISPOSITION:  Patient will be discharged home in stable condition.    FOLLOW UP:  Jerome Rivera  M.D.  [address] [place] [address]  [place] [address]  [phone]    Schedule an appointment as soon as possible for a visit      OUTPATIENT MEDICATIONS:  New Prescriptions    CEPHALEXIN (KEFLEX) 250 MG CAP    Take 2 Caps by mouth 4 times a day for 3 days.     FINAL IMPRESSION  1. Acute cystitis with hematuria       Rayray AHUMADA (Scribe), am scribing for, and in the presence of, Anthony Hicks M.D..    Electronically signed by: Rayray Gray (Scribe), 10/30/2017    Anthony AHUMADA M.D. personally performed the services described in this documentation, as scribed by Rayray Gray in my presence, and it is both accurate and complete.    The note accurately reflects work and decisions made by me.  Anthony Hicks  10/31/2017  3:07 AM

## 2017-10-31 NOTE — ED NOTES
Pt had large BM post enema. BM was brown and noted to be sludge-like and watery in appearance. Dr Hicks made aware.

## 2017-11-01 LAB
BACTERIA UR CULT: NORMAL
SIGNIFICANT IND 70042: NORMAL
SITE SITE: NORMAL
SOURCE SOURCE: NORMAL

## 2018-01-17 ENCOUNTER — HOSPITAL ENCOUNTER (EMERGENCY)
Facility: MEDICAL CENTER | Age: 38
End: 2018-01-17
Attending: EMERGENCY MEDICINE

## 2018-01-17 VITALS
DIASTOLIC BLOOD PRESSURE: 98 MMHG | HEART RATE: 96 BPM | TEMPERATURE: 99.1 F | RESPIRATION RATE: 16 BRPM | SYSTOLIC BLOOD PRESSURE: 133 MMHG | OXYGEN SATURATION: 98 %

## 2018-01-17 DIAGNOSIS — J02.9 SORE THROAT: ICD-10-CM

## 2018-01-17 LAB
S PYO AG THROAT QL: NORMAL
SIGNIFICANT IND 70042: NORMAL
SITE SITE: NORMAL
SOURCE SOURCE: NORMAL

## 2018-01-17 PROCEDURE — 87081 CULTURE SCREEN ONLY: CPT | Mod: EDC

## 2018-01-17 PROCEDURE — 99283 EMERGENCY DEPT VISIT LOW MDM: CPT | Mod: EDC

## 2018-01-17 PROCEDURE — 87880 STREP A ASSAY W/OPTIC: CPT | Mod: EDC

## 2018-01-17 PROCEDURE — 700111 HCHG RX REV CODE 636 W/ 250 OVERRIDE (IP): Mod: EDC | Performed by: EMERGENCY MEDICINE

## 2018-01-17 RX ORDER — DEXAMETHASONE SODIUM PHOSPHATE 10 MG/ML
10 INJECTION, SOLUTION INTRAMUSCULAR; INTRAVENOUS ONCE
Status: COMPLETED | OUTPATIENT
Start: 2018-01-17 | End: 2018-01-17

## 2018-01-17 RX ADMIN — DEXAMETHASONE SODIUM PHOSPHATE 10 MG: 10 INJECTION, SOLUTION INTRAMUSCULAR; INTRAVENOUS at 19:34

## 2018-01-17 NOTE — LETTER
Emergency Services     January 17, 2018    Patient: Florencia Lau   YOB: 1980   Date of Visit: 1/17/2018       To Whom It May Concern:    Florencia Lau and Ely Tiffanie were seen and treated in our emergency department on 1/17/2018.    .    Sincerely,     CARMEN CURRAN M.D.  [facility], EMERGENCY DEPT  Dept: [phone]

## 2018-01-18 NOTE — DISCHARGE INSTRUCTIONS
Pharyngitis  Pharyngitis is a sore throat (pharynx). There is redness, pain, and swelling of your throat.  HOME CARE   · Drink enough fluids to keep your pee (urine) clear or pale yellow.  · Only take medicine as told by your doctor.  ¨ You may get sick again if you do not take medicine as told. Finish your medicines, even if you start to feel better.  ¨ Do not take aspirin.  · Rest.  · Rinse your mouth (gargle) with salt water (½ tsp of salt per 1 qt of water) every 1-2 hours. This will help the pain.  · If you are not at risk for choking, you can suck on hard candy or sore throat lozenges.  GET HELP IF:  · You have large, tender lumps on your neck.  · You have a rash.  · You cough up green, yellow-brown, or bloody spit.  GET HELP RIGHT AWAY IF:   · You have a stiff neck.  · You drool or cannot swallow liquids.  · You throw up (vomit) or are not able to keep medicine or liquids down.  · You have very bad pain that does not go away with medicine.  · You have problems breathing (not from a stuffy nose).  MAKE SURE YOU:   · Understand these instructions.  · Will watch your condition.  · Will get help right away if you are not doing well or get worse.     This information is not intended to replace advice given to you by your health care provider. Make sure you discuss any questions you have with your health care provider.     Document Released: 06/05/2009 Document Revised: 10/08/2014 Document Reviewed: 08/25/2014  [employer] Interactive Patient Education ©2016 [employer]

## 2018-01-18 NOTE — ED PROVIDER NOTES
ER Provider Note     Scribed for Andrés Hammer M.D. by Mayo Gaines. 1/17/2018, 7:10 PM.    Primary Care Provider: Jerome Rivera M.D.  Means of Arrival: walk-in   History obtained from: Patient  History limited by: None     CHIEF COMPLAINT  Sore throat    HPI  Florencia Lau is a 37 y.o. female who presents to the Emergency Department for evaluation of sore throat onset 5 days ago. Per patient's, her sore throat has been constant and worsening since onset. Patient endorses associated fever, rhinorrhea, decreased appetite. She reports eating and swallowing exacerbates her pain. The patient confirms a history of tonsillectomy. Patient admits she is an everyday smoker. She confirms recent sick contacts with her daughter, who is sick with similar symptoms. She denies nausea, vomiting, diarrhea, chest pain, shortness of breath.     REVIEW OF SYSTEMS  See HPI for further details.     E.      PAST MEDICAL HISTORY   has a past medical history of Anxiety; Back pain; Depression; Fibromyalgia; Insomnia; Neck pain; Panic attacks; Shoulder pain; and TMJ syndrome.    SURGICAL HISTORY   has a past surgical history that includes other; tonsillectomy; and tubal ligation.    SOCIAL HISTORY  Social History   Substance Use Topics   • Smoking status: Current Every Day Smoker     Packs/day: 1.00     Types: Cigarettes   • Smokeless tobacco: Never Used      Comment: 1 PK per day   • Alcohol use No      History   Drug Use No       FAMILY HISTORY  Family History   Problem Relation Age of Onset   • Diabetes     • Allergies       RA   • Other       DIVERTICULITIS, LUPUS, DDD, FM       CURRENT MEDICATIONS  No current facility-administered medications on file prior to encounter.      Current Outpatient Prescriptions on File Prior to Encounter   Medication Sig Dispense Refill   • ibuprofen (MOTRIN) 800 MG Tab TAKE 1 TABLET BY MOUTH EVERY 8 HOURS AS NEEDED 30 Tab 0   • divalproex ER (DEPAKOTE ER) 250 MG TABLET SR 24 HR Take  1 Tab by mouth every day. 90 Tab 0   • tizanidine (ZANAFLEX) 4 MG Tab TAKE 1 TABLET BY MOUTH EVERY 6 HOURS AS NEEDED 30 Tab 3   • trazodone (DESYREL) 150 MG Tab TAKE 1 TAB BY MOUTH AT BEDTIME AS NEEDED FOR SLEEP. 30 Tab 1   • venlafaxine (EFFEXOR) 75 MG Tab TAKE 1 TABLET BY MOUTH 2 TIMES A DAY 60 Tab 5   • gabapentin (NEURONTIN) 300 MG Cap TAKE 1 CAPSULE BY MOUTH 3 TIMES A DAY. 90 Cap 5   • acetaminophen (TYLENOL) 325 MG Tab Take 2 Tabs by mouth every four hours as needed. 240 Tab 2   • ondansetron (ZOFRAN) 4 MG Tab tablet TAKE 1 TAB BY MOUTH EVERY 6 HOURS. 90 Tab 2   • albuterol 108 (90 BASE) MCG/ACT Aero Soln inhalation aerosol Inhale 2 Puffs by mouth every 6 hours as needed for Shortness of Breath. 8.5 g 3      ALLERGIES  Allergies   Allergen Reactions   • Codeine Vomiting   • Latex Rash   • Sulfa Drugs        PHYSICAL EXAM  VITAL SIGNS: /98   Pulse 96   Temp 37.3 °C (99.1 °F)   Resp 16   SpO2 98%      Constitutional: Alert in no apparent distress.  HENT: Normocephalic, Atraumatic, Bilateral external ears normal. Nose normal. Posterior oropharynx is mildly erythematous. No exudate.   Eyes: Pupils are equal and reactive. Conjunctiva normal, non-icteric.   Heart: Regular rate and rythm, no murmurs.    Lungs: Clear to auscultation bilaterally.  Skin: Warm, Dry, No erythema, No rash.   Neurologic: Alert, Grossly non-focal.   Psychiatric: Affect normal, Judgment normal, Mood normal, Appears appropriate and not intoxicated.     LABS  Results for orders placed or performed during the hospital encounter of 01/17/18   RAPID STREP, CULT IF INDICATED (CULTURE IF NEGATIVE)   Result Value Ref Range    Significant Indicator NEG     Source THRT     Site THROAT     Rapid Strep Screen       Negative for Group A streptococcus.  A negative result may be obtained if the specimen is  inadequate or antigen concentration is below the  sensitivity of the test. This negative test will be followed  up with a culture as  requested.        All labs reviewed by me.      COURSE & MEDICAL DECISION MAKING  Pertinent Labs & Imaging studies reviewed. (See chart for details)    This is a 37 y.o. female that presents with sore throat and a daughter who is positive for strep. There is some redness in the posterior oropharyngeal area. The patient has no stridor and is nontoxic appearing. This could represent strep given her daughter has it or just could be a viral syndrome. There is no meningismus or neck stiffness is suggested deep space infection..     7:10 PM - Patient seen and examined at bedside. Ordered rapid strep.  Patient will be medicated with 10 mg Decadron for her symptoms. I discussed the plan as above with the patient. She understood and verbalized agreement.      8:27 PM  Patient is feeling much improved after the Decadron. She is Negative. I'll discharge her home with strict return precautions as well as follow-up with primary care physician. I told her to take Tylenol and ibuprofen the pain as well.    The patient will return for new or worsening symptoms and is stable at the time of discharge.    The patient is referred to a primary physician for blood pressure management, diabetic screening, and for all other preventative health concerns.      DISPOSITION:  Patient will be discharged home in stable condition.    FOLLOW UP:  Jerome Rivera M.D.  [address] [place] [id number]  [phone]    In 2 days          FINAL IMPRESSION  1. Sore throat          Mayo AHUMADA (Aram), am scribing for, and in the presence of, Andrés Hammer M.D..    Electronically signed by: Mayo Tolbert), 1/17/2018    Andrés AHUMADA M.D. personally performed the services described in this documentation, as scribed by Mayo Gaines in my presence, and it is both accurate and complete.     The note accurately reflects work and decisions made by me.  Andrés Hammer  1/17/2018  8:27 PM

## 2018-01-19 LAB
S PYO SPEC QL CULT: NORMAL
SIGNIFICANT IND 70042: NORMAL
SITE SITE: NORMAL
SOURCE SOURCE: NORMAL

## 2018-06-12 ENCOUNTER — HOSPITAL ENCOUNTER (OUTPATIENT)
Facility: MEDICAL CENTER | Age: 38
End: 2018-06-12
Attending: FAMILY MEDICINE
Payer: COMMERCIAL

## 2018-06-12 DIAGNOSIS — Z01.812 PRE-PROCEDURAL LABORATORY EXAMINATION: ICD-10-CM

## 2018-06-12 LAB
25(OH)D3 SERPL-MCNC: 19 NG/ML (ref 30–100)
ALBUMIN SERPL BCP-MCNC: 4.6 G/DL (ref 3.2–4.9)
ALBUMIN/GLOB SERPL: 1.5 G/DL
ALP SERPL-CCNC: 59 U/L (ref 30–99)
ALT SERPL-CCNC: 17 U/L (ref 2–50)
ANION GAP SERPL CALC-SCNC: 8 MMOL/L (ref 0–11.9)
AST SERPL-CCNC: 22 U/L (ref 12–45)
BASOPHILS # BLD AUTO: 0.5 % (ref 0–1.8)
BASOPHILS # BLD: 0.03 K/UL (ref 0–0.12)
BILIRUB SERPL-MCNC: 0.4 MG/DL (ref 0.1–1.5)
BUN SERPL-MCNC: 10 MG/DL (ref 8–22)
C TRACH DNA SPEC QL NAA+PROBE: NEGATIVE
CALCIUM SERPL-MCNC: 9.7 MG/DL (ref 8.5–10.5)
CHLORIDE SERPL-SCNC: 108 MMOL/L (ref 96–112)
CHOLEST SERPL-MCNC: 193 MG/DL (ref 100–199)
CO2 SERPL-SCNC: 25 MMOL/L (ref 20–33)
CREAT SERPL-MCNC: 0.71 MG/DL (ref 0.5–1.4)
EOSINOPHIL # BLD AUTO: 0.15 K/UL (ref 0–0.51)
EOSINOPHIL NFR BLD: 2.6 % (ref 0–6.9)
ERYTHROCYTE [DISTWIDTH] IN BLOOD BY AUTOMATED COUNT: 40.6 FL (ref 35.9–50)
ERYTHROCYTE [SEDIMENTATION RATE] IN BLOOD BY WESTERGREN METHOD: 27 MM/HOUR (ref 0–20)
GLOBULIN SER CALC-MCNC: 3 G/DL (ref 1.9–3.5)
GLUCOSE SERPL-MCNC: 100 MG/DL (ref 65–99)
HBV SURFACE AG SER QL: NEGATIVE
HCG SERPL QL: NEGATIVE
HCT VFR BLD AUTO: 45.2 % (ref 37–47)
HCV AB SER QL: NEGATIVE
HDLC SERPL-MCNC: 44 MG/DL
HGB BLD-MCNC: 15 G/DL (ref 12–16)
HIV 1+2 AB+HIV1 P24 AG SERPL QL IA: NON REACTIVE
IMM GRANULOCYTES # BLD AUTO: 0.03 K/UL (ref 0–0.11)
IMM GRANULOCYTES NFR BLD AUTO: 0.5 % (ref 0–0.9)
LDLC SERPL CALC-MCNC: 129 MG/DL
LYMPHOCYTES # BLD AUTO: 1.35 K/UL (ref 1–4.8)
LYMPHOCYTES NFR BLD: 23.4 % (ref 22–41)
MAGNESIUM SERPL-MCNC: 2.3 MG/DL (ref 1.5–2.5)
MCH RBC QN AUTO: 29.9 PG (ref 27–33)
MCHC RBC AUTO-ENTMCNC: 33.2 G/DL (ref 33.6–35)
MCV RBC AUTO: 90 FL (ref 81.4–97.8)
MONOCYTES # BLD AUTO: 0.29 K/UL (ref 0–0.85)
MONOCYTES NFR BLD AUTO: 5 % (ref 0–13.4)
N GONORRHOEA DNA SPEC QL NAA+PROBE: NEGATIVE
NEUTROPHILS # BLD AUTO: 3.92 K/UL (ref 2–7.15)
NEUTROPHILS NFR BLD: 68 % (ref 44–72)
NRBC # BLD AUTO: 0 K/UL
NRBC BLD-RTO: 0 /100 WBC
PLATELET # BLD AUTO: 263 K/UL (ref 164–446)
PMV BLD AUTO: 11.7 FL (ref 9–12.9)
POTASSIUM SERPL-SCNC: 3.9 MMOL/L (ref 3.6–5.5)
PROT SERPL-MCNC: 7.6 G/DL (ref 6–8.2)
RBC # BLD AUTO: 5.02 M/UL (ref 4.2–5.4)
RHEUMATOID FACT SER IA-ACNC: <10 IU/ML (ref 0–14)
SODIUM SERPL-SCNC: 141 MMOL/L (ref 135–145)
SPECIMEN SOURCE: NORMAL
T3FREE SERPL-MCNC: 3.12 PG/ML (ref 2.4–4.2)
T4 FREE SERPL-MCNC: 0.74 NG/DL (ref 0.53–1.43)
TREPONEMA PALLIDUM IGG+IGM AB [PRESENCE] IN SERUM OR PLASMA BY IMMUNOASSAY: NON REACTIVE
TRIGL SERPL-MCNC: 102 MG/DL (ref 0–149)
TSH SERPL DL<=0.005 MIU/L-ACNC: 1.15 UIU/ML (ref 0.38–5.33)
URATE SERPL-MCNC: 3.9 MG/DL (ref 1.9–8.2)
VIT B12 SERPL-MCNC: 430 PG/ML (ref 211–911)
WBC # BLD AUTO: 5.8 K/UL (ref 4.8–10.8)

## 2018-06-12 PROCEDURE — 85025 COMPLETE CBC W/AUTO DIFF WBC: CPT

## 2018-06-12 PROCEDURE — 86803 HEPATITIS C AB TEST: CPT

## 2018-06-12 PROCEDURE — 86200 CCP ANTIBODY: CPT

## 2018-06-12 PROCEDURE — 83735 ASSAY OF MAGNESIUM: CPT

## 2018-06-12 PROCEDURE — 80053 COMPREHEN METABOLIC PANEL: CPT

## 2018-06-12 PROCEDURE — 85652 RBC SED RATE AUTOMATED: CPT

## 2018-06-12 PROCEDURE — 82607 VITAMIN B-12: CPT

## 2018-06-12 PROCEDURE — 84443 ASSAY THYROID STIM HORMONE: CPT

## 2018-06-12 PROCEDURE — 36415 COLL VENOUS BLD VENIPUNCTURE: CPT

## 2018-06-12 PROCEDURE — 86694 HERPES SIMPLEX NES ANTBDY: CPT

## 2018-06-12 PROCEDURE — 84481 FREE ASSAY (FT-3): CPT

## 2018-06-12 PROCEDURE — 84703 CHORIONIC GONADOTROPIN ASSAY: CPT

## 2018-06-12 PROCEDURE — 86431 RHEUMATOID FACTOR QUANT: CPT

## 2018-06-12 PROCEDURE — 86038 ANTINUCLEAR ANTIBODIES: CPT

## 2018-06-12 PROCEDURE — 87340 HEPATITIS B SURFACE AG IA: CPT

## 2018-06-12 PROCEDURE — 84439 ASSAY OF FREE THYROXINE: CPT

## 2018-06-12 PROCEDURE — 86780 TREPONEMA PALLIDUM: CPT

## 2018-06-12 PROCEDURE — 80061 LIPID PANEL: CPT

## 2018-06-12 PROCEDURE — 84550 ASSAY OF BLOOD/URIC ACID: CPT

## 2018-06-12 PROCEDURE — 82306 VITAMIN D 25 HYDROXY: CPT

## 2018-06-12 PROCEDURE — 87491 CHLMYD TRACH DNA AMP PROBE: CPT

## 2018-06-12 PROCEDURE — 86162 COMPLEMENT TOTAL (CH50): CPT

## 2018-06-12 PROCEDURE — 87389 HIV-1 AG W/HIV-1&-2 AB AG IA: CPT

## 2018-06-12 PROCEDURE — 87591 N.GONORRHOEAE DNA AMP PROB: CPT

## 2018-06-12 RX ORDER — LAMOTRIGINE 100 MG/1
100 TABLET ORAL DAILY
Status: SHIPPED | COMMUNITY
End: 2022-02-03

## 2018-06-12 RX ORDER — VENLAFAXINE 75 MG/1
150 TABLET ORAL DAILY
COMMUNITY
End: 2018-09-02

## 2018-06-12 RX ORDER — GABAPENTIN 300 MG/1
600 CAPSULE ORAL 4 TIMES DAILY
COMMUNITY
End: 2018-11-29

## 2018-06-13 LAB
CCP IGG SERPL-ACNC: 11 UNITS (ref 0–19)
NUCLEAR IGG SER QL IA: NORMAL

## 2018-06-14 LAB
CH50 SERPL-ACNC: 110 CAE UNITS (ref 60–144)
HSV1+2 IGG SER IA-ACNC: >22.4 IV
HSV1+2 IGM SER IA-ACNC: 1.97 IV

## 2018-06-22 ENCOUNTER — HOSPITAL ENCOUNTER (EMERGENCY)
Facility: MEDICAL CENTER | Age: 38
End: 2018-06-22
Attending: EMERGENCY MEDICINE
Payer: COMMERCIAL

## 2018-06-22 VITALS
SYSTOLIC BLOOD PRESSURE: 120 MMHG | OXYGEN SATURATION: 95 % | RESPIRATION RATE: 18 BRPM | HEIGHT: 67 IN | WEIGHT: 174.38 LBS | HEART RATE: 76 BPM | DIASTOLIC BLOOD PRESSURE: 82 MMHG | TEMPERATURE: 98.6 F | BODY MASS INDEX: 27.37 KG/M2

## 2018-06-22 DIAGNOSIS — M54.2 MUSCULOSKELETAL NECK PAIN: ICD-10-CM

## 2018-06-22 DIAGNOSIS — M43.6 TORTICOLLIS: ICD-10-CM

## 2018-06-22 PROCEDURE — 700111 HCHG RX REV CODE 636 W/ 250 OVERRIDE (IP): Performed by: EMERGENCY MEDICINE

## 2018-06-22 PROCEDURE — 99284 EMERGENCY DEPT VISIT MOD MDM: CPT

## 2018-06-22 PROCEDURE — 96372 THER/PROPH/DIAG INJ SC/IM: CPT

## 2018-06-22 RX ORDER — KETOROLAC TROMETHAMINE 30 MG/ML
60 INJECTION, SOLUTION INTRAMUSCULAR; INTRAVENOUS ONCE
Status: COMPLETED | OUTPATIENT
Start: 2018-06-22 | End: 2018-06-22

## 2018-06-22 RX ORDER — DIAZEPAM 5 MG/1
5 TABLET ORAL EVERY 6 HOURS PRN
Qty: 15 TAB | Refills: 0 | Status: SHIPPED | OUTPATIENT
Start: 2018-06-22 | End: 2018-06-30

## 2018-06-22 RX ADMIN — KETOROLAC TROMETHAMINE 60 MG: 30 INJECTION, SOLUTION INTRAMUSCULAR at 17:33

## 2018-06-22 ASSESSMENT — PAIN SCALES - GENERAL
PAINLEVEL_OUTOF10: 7
PAINLEVEL_OUTOF10: 8

## 2018-06-22 ASSESSMENT — PAIN SCALES - WONG BAKER: WONGBAKER_NUMERICALRESPONSE: DOESN'T HURT AT ALL

## 2018-06-23 ENCOUNTER — PATIENT OUTREACH (OUTPATIENT)
Dept: HEALTH INFORMATION MANAGEMENT | Facility: OTHER | Age: 38
End: 2018-06-23

## 2018-06-23 NOTE — ED NOTES
".  Chief Complaint   Patient presents with   • Shoulder Pain     about 2 weeks ago pt was more active than normal and pt now has persistent pain in her left shoulder and neck.    • Nausea     ./75   Pulse 88   Temp 37 °C (98.6 °F)   Resp 16   Ht 1.702 m (5' 7\")   Wt 79.1 kg (174 lb 6.1 oz)   BMI 27.31 kg/m²     "

## 2018-06-23 NOTE — DISCHARGE INSTRUCTIONS
Acute Torticollis  Torticollis is a condition in which the muscles of the neck tighten (contract) abnormally, causing the neck to twist and the head to move into an unnatural position. Torticollis that develops suddenly is called acute torticollis. If torticollis becomes chronic and is left untreated, the face and neck can become deformed.  CAUSES  This condition may be caused by:  · Sleeping in an awkward position (common).  · Extending or twisting the neck muscles beyond their normal position.  · Infection.  In some cases, the cause may not be known.  SYMPTOMS  Symptoms of this condition include:  · An unnatural position of the head.  · Neck pain.  · A limited ability to move the neck.  · Twisting of the neck to one side.  DIAGNOSIS  This condition is diagnosed with a physical exam. You may also have imaging tests, such as an X-ray, CT scan, or MRI.  TREATMENT  Treatment for this condition involves trying to relax the neck muscles. It may include:  · Medicines or shots.  · Physical therapy.  · Surgery. This may be done in severe cases.  HOME CARE INSTRUCTIONS  · Take medicines only as directed by your health care provider.  · Do stretching exercises and massage your neck as directed by your health care provider.  · Keep all follow-up visits as directed by your health care provider. This is important.  SEEK MEDICAL CARE IF:  · You develop a fever.  SEEK IMMEDIATE MEDICAL CARE IF:  · You develop difficulty breathing.  · You develop noisy breathing (stridor).  · You start drooling.  · You have trouble swallowing or have pain with swallowing.  · You develop numbness or weakness in your hands or feet.  · You have changes in your speech, understanding, or vision.  · Your pain gets worse.  This information is not intended to replace advice given to you by your health care provider. Make sure you discuss any questions you have with your health care provider.  Document Released: 12/15/2001 Document Revised: 04/10/2017  Document Reviewed: 12/14/2015  [employer] Interactive Patient Education © 2017 [employer]

## 2018-06-23 NOTE — PROGRESS NOTES
Patient given DC instructions and verbalized understanding. Patient ambulated out of ED in good condition. '

## 2018-06-23 NOTE — ED PROVIDER NOTES
"ED Provider Note    CHIEF COMPLAINT  Chief Complaint   Patient presents with   • Shoulder Pain     about 2 weeks ago pt was more active than normal and pt now has persistent pain in her left shoulder and neck.    • Nausea       HPI  Florencia Lau is a 38 y.o. female who presents stating that she was moving quite a bit of material a couple of weeks ago off a pallet in the next day she had neck and shoulder discomfort at the trapezius on the left side. She reports that has been painful to sleep and that stretching does not seem to help. The pain is well localized and radiates down the shoulder long term to the arm. She describes a little bit of tingling but no weakness. Denies any trauma, fever, chills, sweats, IVDA or other complaints. She is hoping for a shot of Toradol to relieve her discomfort    ROS  Pertinent negative for fever or trauma    PAST MEDICAL HISTORY  Past Medical History:   Diagnosis Date   • Anxiety    • Anxiety and depression 06/12/2018   • Back pain    • Bowel habit changes 06/12/2018    \"Constipation/Diarrhea\"   • Bronchitis     HX OF   • Depression    • Fibromyalgia    • Hayfever 06/12/2018   • Indigestion 06/12/2018   • Insomnia    • Muscle spasm 06/12/2018    \"My neurologist told me I have a muscle spasm disorder\"   • Neck pain    • Panic attacks    • Shoulder pain    • Stress incontinence 06/12/2018   • TMJ syndrome        FAMILY HISTORY  Family History   Problem Relation Age of Onset   • Diabetes     • Allergies       RA   • Other       DIVERTICULITIS, LUPUS, DDD, FM       SOCIAL HISTORY   reports that she has been smoking Cigarettes.  She has been smoking about 0.50 packs per day. She has never used smokeless tobacco. She reports that she does not drink alcohol or use drugs.    SURGICAL HISTORY  Past Surgical History:   Procedure Laterality Date   • OTHER      neck abscess   • TONSILLECTOMY     • TUBAL LIGATION         CURRENT MEDICATIONS  Home Medications    **Home medications have " "not yet been reviewed for this encounter**         ALLERGIES  Allergies   Allergen Reactions   • Codeine Vomiting   • Latex Rash   • Sulfa Drugs        PHYSICAL EXAM  VITAL SIGNS: /75   Pulse 88   Temp 37 °C (98.6 °F)   Resp 16   Ht 1.702 m (5' 7\")   Wt 79.1 kg (174 lb 6.1 oz)   BMI 27.31 kg/m²    Constitutional: Well developed, Well nourished, No acute distress, Non-toxic appearance.   HENT:   Eyes:   Neck: Spasm noted at the left lower cervical into the left trapezius with trigger points identified.  Skin: Normal  Back: Nontender midline cervical and thoracic spine  Extremities:   Musculoskeletal: Full range of motion of left shoulder and arm  Neurologic: Neurologically intact with normal sensation and strength  Psychiatric:       COURSE & MEDICAL DECISION MAKING  Pertinent Labs & Imaging studies reviewed. (See chart for details)  I gave the patient Toradol 60 mg intramuscular injection for discomfort. I suggest that she stretch aggressively and obtain a mechanical massager as well as possibly seen a chiropractor. I'm willing to help her with Valium as needed for muscle relaxation. She is asking for x-rays to be obtained however I informed her that these are not useful for this type of presentation. Patient is discharged in stable condition somewhat improved after Toradol injection    FINAL IMPRESSION  1. Musculoskeletal neck pain    2. Torticollis            Electronically signed by: Guzman De Oliveira, 6/22/2018 5:46 PM            "

## 2018-06-26 ENCOUNTER — HOSPITAL ENCOUNTER (OUTPATIENT)
Dept: RADIOLOGY | Facility: MEDICAL CENTER | Age: 38
End: 2018-06-26
Attending: FAMILY MEDICINE
Payer: COMMERCIAL

## 2018-06-26 DIAGNOSIS — M54.5 LOW BACK PAIN, UNSPECIFIED BACK PAIN LATERALITY, UNSPECIFIED CHRONICITY, WITH SCIATICA PRESENCE UNSPECIFIED: ICD-10-CM

## 2018-06-26 DIAGNOSIS — M79.9 DISORDER OF SOFT TISSUE: ICD-10-CM

## 2018-06-26 PROCEDURE — 72040 X-RAY EXAM NECK SPINE 2-3 VW: CPT

## 2018-06-26 PROCEDURE — 72072 X-RAY EXAM THORAC SPINE 3VWS: CPT

## 2018-06-26 PROCEDURE — 72100 X-RAY EXAM L-S SPINE 2/3 VWS: CPT

## 2018-06-26 NOTE — H&P
HISTORY OF PRESENT ILLNESS:  The patient is a 38-year-old  with   bothersome stress urinary incontinence who is here for transobturator tape.    This has been a problem for 1 year.  She describes leakage of urine with   coughing, laughing, lifting, running, and sneezing.  Denies painful urination,   difficulty emptying, reduced stream, dribbling or hematuria.    PAST MEDICAL HISTORY:  1.  Depression.  2.  Migraine headache.  3.  Fibromyalgia.    PAST SURGICAL HISTORY:  Tubal ligation, tonsillectomy and adenoidectomy.    FAMILY HISTORY:  Positive for hypertension and type 2 diabetes.    SOCIAL HISTORY:  The patient currently smokes one-half pack per day.  Denies   alcohol abuse.    GYNECOLOGIC HISTORY:  The patient is a G4, P1-0-2-1.    ALLERGIES:  SULFA AND LATEX.    MEDICATIONS:  1.  Effexor XR.  2.  Gabapentin.  3.  Ibuprofen.  4.  Trazodone.  5.  Vitamin D3.    PHYSICAL EXAMINATION:  VITAL SIGNS:  Height 5 feet 7 inches, weight 172 pounds, BMI is 26.9, blood   pressure 100/60, pulse 78, and oxygen saturation is 96% on room air.  GENERAL:  She is well nourished, well developed.  CARDIOVASCULAR:  RRR.  PULMONARY:  CTAB.  ABDOMEN:  Soft and nontender.  GENITOURINARY:  Grade I cystocele.  Normal mucosa.  A postvoid residual of 20   mL of concentrated urine.  Bladder testing was performed.  The patient   demonstrated a significant cough stress test.    ASSESSMENT AND PLAN:  A 38-year-old  with stress urinary incontinence and   a positive stress test, normal postvoid residual and minimal-to-no   significant prolapse, desires transobturator tape procedure, nonsurgical   options were reviewed with the patient, who declines.  The risks and benefits   were reviewed.  Recovery time was discussed.  The patient was congratulated   working on smoking cessation and weight loss.  She understands that these   behavioral changes will improve her outcomes and decrease her risk for   failure.        ____________________________________     MD JEREMY Bermeo / STEPHEN    DD:  06/26/2018 09:10:02  DT:  06/26/2018 09:24:37    D#:  [id number]  Job#:  [id number]

## 2018-06-27 ENCOUNTER — HOSPITAL ENCOUNTER (OUTPATIENT)
Facility: MEDICAL CENTER | Age: 38
End: 2018-06-29
Attending: OBSTETRICS & GYNECOLOGY | Admitting: OBSTETRICS & GYNECOLOGY
Payer: COMMERCIAL

## 2018-06-27 ENCOUNTER — APPOINTMENT (OUTPATIENT)
Dept: RADIOLOGY | Facility: MEDICAL CENTER | Age: 38
End: 2018-06-27
Attending: INTERNAL MEDICINE
Payer: COMMERCIAL

## 2018-06-27 DIAGNOSIS — R29.898 WEAKNESS OF BOTH LOWER EXTREMITIES: ICD-10-CM

## 2018-06-27 DIAGNOSIS — N39.3 STRESS INCONTINENCE: Primary | ICD-10-CM

## 2018-06-27 PROBLEM — F32.A DEPRESSION: Status: ACTIVE | Noted: 2018-06-27

## 2018-06-27 PROBLEM — F33.41 RECURRENT MAJOR DEPRESSIVE DISORDER, IN PARTIAL REMISSION (HCC): Status: ACTIVE | Noted: 2018-06-27

## 2018-06-27 PROBLEM — M79.7 FIBROMYALGIA: Status: ACTIVE | Noted: 2018-06-27

## 2018-06-27 LAB
B-HCG FREE SERPL-ACNC: <5 MIU/ML
IHCGL IHCGL: NEGATIVE MIU/ML

## 2018-06-27 PROCEDURE — 700102 HCHG RX REV CODE 250 W/ 637 OVERRIDE(OP): Performed by: INTERNAL MEDICINE

## 2018-06-27 PROCEDURE — 72156 MRI NECK SPINE W/O & W/DYE: CPT

## 2018-06-27 PROCEDURE — 500892 HCHG PACK, PERI-GYN: Performed by: OBSTETRICS & GYNECOLOGY

## 2018-06-27 PROCEDURE — 110454 HCHG SHELL REV 250: Performed by: OBSTETRICS & GYNECOLOGY

## 2018-06-27 PROCEDURE — 700102 HCHG RX REV CODE 250 W/ 637 OVERRIDE(OP): Performed by: OBSTETRICS & GYNECOLOGY

## 2018-06-27 PROCEDURE — 84702 CHORIONIC GONADOTROPIN TEST: CPT

## 2018-06-27 PROCEDURE — 72157 MRI CHEST SPINE W/O & W/DYE: CPT

## 2018-06-27 PROCEDURE — 72128 CT CHEST SPINE W/O DYE: CPT

## 2018-06-27 PROCEDURE — 72131 CT LUMBAR SPINE W/O DYE: CPT

## 2018-06-27 PROCEDURE — C1771 REP DEV, URINARY, W/SLING: HCPCS | Performed by: OBSTETRICS & GYNECOLOGY

## 2018-06-27 PROCEDURE — 700102 HCHG RX REV CODE 250 W/ 637 OVERRIDE(OP)

## 2018-06-27 PROCEDURE — 160009 HCHG ANES TIME/MIN: Performed by: OBSTETRICS & GYNECOLOGY

## 2018-06-27 PROCEDURE — 302135 SEQUENTIAL COMPRESSION MACHINE: Performed by: OBSTETRICS & GYNECOLOGY

## 2018-06-27 PROCEDURE — G0378 HOSPITAL OBSERVATION PER HR: HCPCS

## 2018-06-27 PROCEDURE — A9270 NON-COVERED ITEM OR SERVICE: HCPCS | Performed by: INTERNAL MEDICINE

## 2018-06-27 PROCEDURE — 160029 HCHG SURGERY MINUTES - 1ST 30 MINS LEVEL 4: Performed by: OBSTETRICS & GYNECOLOGY

## 2018-06-27 PROCEDURE — 99245 OFF/OP CONSLTJ NEW/EST HI 55: CPT | Performed by: INTERNAL MEDICINE

## 2018-06-27 PROCEDURE — 160002 HCHG RECOVERY MINUTES (STAT): Performed by: OBSTETRICS & GYNECOLOGY

## 2018-06-27 PROCEDURE — 306588 SLEEVE,VASO CALF MED: Performed by: OBSTETRICS & GYNECOLOGY

## 2018-06-27 PROCEDURE — 160048 HCHG OR STATISTICAL LEVEL 1-5: Performed by: OBSTETRICS & GYNECOLOGY

## 2018-06-27 PROCEDURE — A4338 INDWELLING CATHETER LATEX: HCPCS | Performed by: OBSTETRICS & GYNECOLOGY

## 2018-06-27 PROCEDURE — 700101 HCHG RX REV CODE 250

## 2018-06-27 PROCEDURE — 96375 TX/PRO/DX INJ NEW DRUG ADDON: CPT

## 2018-06-27 PROCEDURE — 700111 HCHG RX REV CODE 636 W/ 250 OVERRIDE (IP): Performed by: OBSTETRICS & GYNECOLOGY

## 2018-06-27 PROCEDURE — 72158 MRI LUMBAR SPINE W/O & W/DYE: CPT

## 2018-06-27 PROCEDURE — A9270 NON-COVERED ITEM OR SERVICE: HCPCS | Performed by: OBSTETRICS & GYNECOLOGY

## 2018-06-27 PROCEDURE — 160036 HCHG PACU - EA ADDL 30 MINS PHASE I: Performed by: OBSTETRICS & GYNECOLOGY

## 2018-06-27 PROCEDURE — 700111 HCHG RX REV CODE 636 W/ 250 OVERRIDE (IP)

## 2018-06-27 PROCEDURE — 160035 HCHG PACU - 1ST 60 MINS PHASE I: Performed by: OBSTETRICS & GYNECOLOGY

## 2018-06-27 PROCEDURE — 700117 HCHG RX CONTRAST REV CODE 255: Performed by: INTERNAL MEDICINE

## 2018-06-27 PROCEDURE — A9270 NON-COVERED ITEM OR SERVICE: HCPCS

## 2018-06-27 PROCEDURE — 72125 CT NECK SPINE W/O DYE: CPT

## 2018-06-27 PROCEDURE — 72197 MRI PELVIS W/O & W/DYE: CPT

## 2018-06-27 PROCEDURE — 160041 HCHG SURGERY MINUTES - EA ADDL 1 MIN LEVEL 4: Performed by: OBSTETRICS & GYNECOLOGY

## 2018-06-27 PROCEDURE — 501330 HCHG SET, CYSTO IRRIG TUBING: Performed by: OBSTETRICS & GYNECOLOGY

## 2018-06-27 PROCEDURE — 501838 HCHG SUTURE GENERAL: Performed by: OBSTETRICS & GYNECOLOGY

## 2018-06-27 PROCEDURE — 96374 THER/PROPH/DIAG INJ IV PUSH: CPT

## 2018-06-27 PROCEDURE — A9579 GAD-BASE MR CONTRAST NOS,1ML: HCPCS | Performed by: INTERNAL MEDICINE

## 2018-06-27 DEVICE — SLING TOT OBTRYX CURVED: Type: IMPLANTABLE DEVICE | Site: BLADDER | Status: FUNCTIONAL

## 2018-06-27 RX ORDER — TRAZODONE HYDROCHLORIDE 50 MG/1
150 TABLET ORAL
Status: DISCONTINUED | OUTPATIENT
Start: 2018-06-27 | End: 2018-06-29 | Stop reason: HOSPADM

## 2018-06-27 RX ORDER — LIDOCAINE HYDROCHLORIDE AND EPINEPHRINE 10; 10 MG/ML; UG/ML
INJECTION, SOLUTION INFILTRATION; PERINEURAL
Status: DISCONTINUED | OUTPATIENT
Start: 2018-06-27 | End: 2018-06-27 | Stop reason: HOSPADM

## 2018-06-27 RX ORDER — DIAZEPAM 5 MG/1
5 TABLET ORAL EVERY 6 HOURS PRN
Status: DISCONTINUED | OUTPATIENT
Start: 2018-06-27 | End: 2018-06-27

## 2018-06-27 RX ORDER — VENLAFAXINE 75 MG/1
150 TABLET ORAL DAILY
Status: DISCONTINUED | OUTPATIENT
Start: 2018-06-28 | End: 2018-06-27

## 2018-06-27 RX ORDER — KETOROLAC TROMETHAMINE 30 MG/ML
INJECTION, SOLUTION INTRAMUSCULAR; INTRAVENOUS
Status: COMPLETED
Start: 2018-06-27 | End: 2018-06-27

## 2018-06-27 RX ORDER — AMOXICILLIN 250 MG
2 CAPSULE ORAL 2 TIMES DAILY
Status: DISCONTINUED | OUTPATIENT
Start: 2018-06-27 | End: 2018-06-29 | Stop reason: HOSPADM

## 2018-06-27 RX ORDER — OXYCODONE HYDROCHLORIDE AND ACETAMINOPHEN 5; 325 MG/1; MG/1
1-2 TABLET ORAL EVERY 4 HOURS PRN
Status: DISCONTINUED | OUTPATIENT
Start: 2018-06-27 | End: 2018-06-27

## 2018-06-27 RX ORDER — ONDANSETRON 2 MG/ML
4 INJECTION INTRAMUSCULAR; INTRAVENOUS EVERY 4 HOURS PRN
Status: DISCONTINUED | OUTPATIENT
Start: 2018-06-27 | End: 2018-06-29 | Stop reason: HOSPADM

## 2018-06-27 RX ORDER — LORAZEPAM 2 MG/ML
1 INJECTION INTRAMUSCULAR ONCE
Status: COMPLETED | OUTPATIENT
Start: 2018-06-27 | End: 2018-06-27

## 2018-06-27 RX ORDER — ACETAMINOPHEN 325 MG/1
650 TABLET ORAL EVERY 6 HOURS PRN
Status: DISCONTINUED | OUTPATIENT
Start: 2018-06-27 | End: 2018-06-29 | Stop reason: HOSPADM

## 2018-06-27 RX ORDER — SODIUM CHLORIDE, SODIUM LACTATE, POTASSIUM CHLORIDE, CALCIUM CHLORIDE 600; 310; 30; 20 MG/100ML; MG/100ML; MG/100ML; MG/100ML
INJECTION, SOLUTION INTRAVENOUS CONTINUOUS
Status: DISCONTINUED | OUTPATIENT
Start: 2018-06-27 | End: 2018-06-27

## 2018-06-27 RX ORDER — IBUPROFEN 800 MG/1
800 TABLET ORAL EVERY 6 HOURS PRN
Status: DISCONTINUED | OUTPATIENT
Start: 2018-06-27 | End: 2018-06-29 | Stop reason: HOSPADM

## 2018-06-27 RX ORDER — DIAZEPAM 5 MG/1
5 TABLET ORAL EVERY 6 HOURS PRN
Status: DISCONTINUED | OUTPATIENT
Start: 2018-06-27 | End: 2018-06-29 | Stop reason: HOSPADM

## 2018-06-27 RX ORDER — VENLAFAXINE HYDROCHLORIDE 75 MG/1
150 CAPSULE, EXTENDED RELEASE ORAL
Status: DISCONTINUED | OUTPATIENT
Start: 2018-06-28 | End: 2018-06-29 | Stop reason: HOSPADM

## 2018-06-27 RX ORDER — ALBUTEROL SULFATE 90 UG/1
2 AEROSOL, METERED RESPIRATORY (INHALATION) EVERY 6 HOURS PRN
Status: DISCONTINUED | OUTPATIENT
Start: 2018-06-27 | End: 2018-06-29 | Stop reason: HOSPADM

## 2018-06-27 RX ORDER — TIZANIDINE 4 MG/1
4 TABLET ORAL EVERY 6 HOURS PRN
Status: DISCONTINUED | OUTPATIENT
Start: 2018-06-27 | End: 2018-06-29 | Stop reason: HOSPADM

## 2018-06-27 RX ORDER — LIDOCAINE HYDROCHLORIDE AND EPINEPHRINE 10; 10 MG/ML; UG/ML
INJECTION, SOLUTION INFILTRATION; PERINEURAL
Status: DISCONTINUED
Start: 2018-06-27 | End: 2018-06-27

## 2018-06-27 RX ORDER — OXYCODONE HYDROCHLORIDE AND ACETAMINOPHEN 5; 325 MG/1; MG/1
1-2 TABLET ORAL EVERY 6 HOURS PRN
Qty: 20 TAB | Refills: 0 | Status: SHIPPED | OUTPATIENT
Start: 2018-06-27 | End: 2018-06-30

## 2018-06-27 RX ORDER — GABAPENTIN 300 MG/1
300 CAPSULE ORAL 4 TIMES DAILY
Status: DISCONTINUED | OUTPATIENT
Start: 2018-06-27 | End: 2018-06-29 | Stop reason: HOSPADM

## 2018-06-27 RX ORDER — OXYCODONE HCL 5 MG/5 ML
SOLUTION, ORAL ORAL
Status: COMPLETED
Start: 2018-06-27 | End: 2018-06-27

## 2018-06-27 RX ORDER — POLYETHYLENE GLYCOL 3350 17 G/17G
1 POWDER, FOR SOLUTION ORAL
Status: DISCONTINUED | OUTPATIENT
Start: 2018-06-27 | End: 2018-06-29 | Stop reason: HOSPADM

## 2018-06-27 RX ORDER — MORPHINE SULFATE 4 MG/ML
INJECTION, SOLUTION INTRAMUSCULAR; INTRAVENOUS
Status: COMPLETED
Start: 2018-06-27 | End: 2018-06-27

## 2018-06-27 RX ORDER — HYDROCODONE BITARTRATE AND ACETAMINOPHEN 5; 325 MG/1; MG/1
1-2 TABLET ORAL EVERY 6 HOURS PRN
Status: DISCONTINUED | OUTPATIENT
Start: 2018-06-27 | End: 2018-06-28

## 2018-06-27 RX ORDER — ONDANSETRON 4 MG/1
4 TABLET, ORALLY DISINTEGRATING ORAL EVERY 4 HOURS PRN
Status: DISCONTINUED | OUTPATIENT
Start: 2018-06-27 | End: 2018-06-29 | Stop reason: HOSPADM

## 2018-06-27 RX ORDER — ONDANSETRON 2 MG/ML
4 INJECTION INTRAMUSCULAR; INTRAVENOUS EVERY 6 HOURS PRN
Status: DISCONTINUED | OUTPATIENT
Start: 2018-06-27 | End: 2018-06-27

## 2018-06-27 RX ORDER — LABETALOL HYDROCHLORIDE 5 MG/ML
10 INJECTION, SOLUTION INTRAVENOUS EVERY 4 HOURS PRN
Status: DISCONTINUED | OUTPATIENT
Start: 2018-06-27 | End: 2018-06-29

## 2018-06-27 RX ORDER — LAMOTRIGINE 100 MG/1
100 TABLET ORAL DAILY
Status: DISCONTINUED | OUTPATIENT
Start: 2018-06-27 | End: 2018-06-27

## 2018-06-27 RX ORDER — MORPHINE SULFATE 4 MG/ML
4 INJECTION, SOLUTION INTRAMUSCULAR; INTRAVENOUS ONCE
Status: COMPLETED | OUTPATIENT
Start: 2018-06-27 | End: 2018-06-27

## 2018-06-27 RX ORDER — BISACODYL 10 MG
10 SUPPOSITORY, RECTAL RECTAL
Status: DISCONTINUED | OUTPATIENT
Start: 2018-06-27 | End: 2018-06-29 | Stop reason: HOSPADM

## 2018-06-27 RX ORDER — KETOROLAC TROMETHAMINE 30 MG/ML
30 INJECTION, SOLUTION INTRAMUSCULAR; INTRAVENOUS EVERY 6 HOURS PRN
Status: DISCONTINUED | OUTPATIENT
Start: 2018-06-27 | End: 2018-06-27

## 2018-06-27 RX ORDER — GABAPENTIN 300 MG/1
600 CAPSULE ORAL 4 TIMES DAILY
Status: DISCONTINUED | OUTPATIENT
Start: 2018-06-27 | End: 2018-06-27

## 2018-06-27 RX ORDER — LAMOTRIGINE 100 MG/1
100 TABLET ORAL DAILY
Status: DISCONTINUED | OUTPATIENT
Start: 2018-06-28 | End: 2018-06-29 | Stop reason: HOSPADM

## 2018-06-27 RX ADMIN — GABAPENTIN 600 MG: 300 CAPSULE ORAL at 18:31

## 2018-06-27 RX ADMIN — FENTANYL CITRATE 50 MCG: 50 INJECTION, SOLUTION INTRAMUSCULAR; INTRAVENOUS at 10:30

## 2018-06-27 RX ADMIN — SODIUM CHLORIDE, SODIUM LACTATE, POTASSIUM CHLORIDE, CALCIUM CHLORIDE: 600; 310; 30; 20 INJECTION, SOLUTION INTRAVENOUS at 09:00

## 2018-06-27 RX ADMIN — GADODIAMIDE 15 ML: 287 INJECTION INTRAVENOUS at 20:39

## 2018-06-27 RX ADMIN — MORPHINE SULFATE 2 MG: 4 INJECTION INTRAVENOUS at 13:15

## 2018-06-27 RX ADMIN — GABAPENTIN 600 MG: 300 CAPSULE ORAL at 19:36

## 2018-06-27 RX ADMIN — TRAZODONE HYDROCHLORIDE 150 MG: 50 TABLET ORAL at 22:04

## 2018-06-27 RX ADMIN — HYDROCODONE BITARTRATE AND ACETAMINOPHEN 30 ML: 2.5; 108 SOLUTION ORAL at 15:30

## 2018-06-27 RX ADMIN — MORPHINE SULFATE 4 MG: 4 INJECTION INTRAVENOUS at 19:36

## 2018-06-27 RX ADMIN — HYDROCODONE BITARTRATE AND ACETAMINOPHEN 2 TABLET: 5; 325 TABLET ORAL at 22:03

## 2018-06-27 RX ADMIN — MORPHINE SULFATE 2 MG: 4 INJECTION INTRAVENOUS at 15:48

## 2018-06-27 RX ADMIN — KETOROLAC TROMETHAMINE 30 MG: 30 INJECTION, SOLUTION INTRAMUSCULAR at 11:30

## 2018-06-27 RX ADMIN — FENTANYL CITRATE 50 MCG: 50 INJECTION, SOLUTION INTRAMUSCULAR; INTRAVENOUS at 10:20

## 2018-06-27 RX ADMIN — LAMOTRIGINE 100 MG: 100 TABLET ORAL at 18:31

## 2018-06-27 RX ADMIN — TIZANIDINE 4 MG: 4 TABLET ORAL at 22:29

## 2018-06-27 RX ADMIN — KETOROLAC TROMETHAMINE 30 MG: 30 INJECTION, SOLUTION INTRAMUSCULAR at 18:31

## 2018-06-27 RX ADMIN — OXYCODONE HYDROCHLORIDE 10 MG: 5 SOLUTION ORAL at 10:20

## 2018-06-27 RX ADMIN — LORAZEPAM 1 MG: 2 INJECTION INTRAMUSCULAR; INTRAVENOUS at 19:33

## 2018-06-27 ASSESSMENT — PAIN SCALES - GENERAL
PAINLEVEL_OUTOF10: 7
PAINLEVEL_OUTOF10: 9
PAINLEVEL_OUTOF10: 8
PAINLEVEL_OUTOF10: 7
PAINLEVEL_OUTOF10: 9
PAINLEVEL_OUTOF10: 5
PAINLEVEL_OUTOF10: 8
PAINLEVEL_OUTOF10: 8
PAINLEVEL_OUTOF10: 7
PAINLEVEL_OUTOF10: 8
PAINLEVEL_OUTOF10: 9
PAINLEVEL_OUTOF10: 8
PAINLEVEL_OUTOF10: 8
PAINLEVEL_OUTOF10: 9
PAINLEVEL_OUTOF10: 5
PAINLEVEL_OUTOF10: 8
PAINLEVEL_OUTOF10: 9
PAINLEVEL_OUTOF10: 8
PAINLEVEL_OUTOF10: 9
PAINLEVEL_OUTOF10: 9

## 2018-06-27 ASSESSMENT — ENCOUNTER SYMPTOMS
BACK PAIN: 0
NECK PAIN: 0
HEMOPTYSIS: 0
SEIZURES: 0
DIARRHEA: 0
COUGH: 0
DEPRESSION: 1
INSOMNIA: 1
CHILLS: 0
MEMORY LOSS: 0
NERVOUS/ANXIOUS: 0
HEADACHES: 0
LOSS OF CONSCIOUSNESS: 0
TINGLING: 1
DOUBLE VISION: 0
MYALGIAS: 0
HALLUCINATIONS: 0
CLAUDICATION: 0
CONSTIPATION: 0
SENSORY CHANGE: 1
VOMITING: 0
BLOOD IN STOOL: 0
PND: 0
WEAKNESS: 0
PALPITATIONS: 0
HEARTBURN: 0
SPEECH CHANGE: 0
SHORTNESS OF BREATH: 0
ABDOMINAL PAIN: 0
BLURRED VISION: 0
FOCAL WEAKNESS: 1
DIAPHORESIS: 0
WHEEZING: 0
TREMORS: 0
FALLS: 0
DIZZINESS: 0
FEVER: 0
ORTHOPNEA: 0
NAUSEA: 0
SPUTUM PRODUCTION: 0

## 2018-06-27 ASSESSMENT — PATIENT HEALTH QUESTIONNAIRE - PHQ9
2. FEELING DOWN, DEPRESSED, IRRITABLE, OR HOPELESS: NOT AT ALL
1. LITTLE INTEREST OR PLEASURE IN DOING THINGS: NOT AT ALL
SUM OF ALL RESPONSES TO PHQ9 QUESTIONS 1 AND 2: 0

## 2018-06-27 ASSESSMENT — LIFESTYLE VARIABLES
EVER_SMOKED: YES
ALCOHOL_USE: NO
SUBSTANCE_ABUSE: 0

## 2018-06-27 ASSESSMENT — COPD QUESTIONNAIRES
DO YOU EVER COUGH UP ANY MUCUS OR PHLEGM?: NO/ONLY WITH OCCASIONAL COLDS OR INFECTIONS
COPD SCREENING SCORE: 4
HAVE YOU SMOKED AT LEAST 100 CIGARETTES IN YOUR ENTIRE LIFE: YES
DURING THE PAST 4 WEEKS HOW MUCH DID YOU FEEL SHORT OF BREATH: SOME OF THE TIME

## 2018-06-27 NOTE — DISCHARGE INSTRUCTIONS
ACTIVITY: Rest and take it easy for the first 24 hours.  A responsible adult is recommended to remain with you during that time.  It is normal to feel sleepy.  We encourage you to not do anything that requires balance, judgment or coordination.    MILD FLU-LIKE SYMPTOMS ARE NORMAL. YOU MAY EXPERIENCE GENERALIZED MUSCLE ACHES, THROAT IRRITATION, HEADACHE AND/OR SOME NAUSEA.    FOR 24 HOURS DO NOT:  Drive, operate machinery or run household appliances.  Drink beer or alcoholic beverages.   Make important decisions or sign legal documents.    SPECIAL INSTRUCTIONS: Patient Discharge Instructions:  No heavy lifting for 1 week  Pelvic rest (no tampons, no douche, no baths, no intercourse) for 1 week.  Call Dr. Banks office at [phone] for heavy vaginal bleeding or fever > 100.5 Degrees F***    DIET: To avoid nausea, slowly advance diet as tolerated, avoiding spicy or greasy foods for the first day.  Add more substantial food to your diet according to your physician's instructions.  Babies can be fed formula or breast milk as soon as they are hungry.  INCREASE FLUIDS AND FIBER TO AVOID CONSTIPATION.    FOLLOW-UP APPOINTMENT:  A follow-up appointment should be arranged with your doctor; call to schedule.    You should CALL YOUR PHYSICIAN if you develop:  Fever greater than 101 degrees F.  Pain not relieved by medication, or persistent nausea or vomiting.  Excessive bleeding (blood soaking through dressing) or unexpected drainage from the wound.  Extreme redness or swelling around the incision site, drainage of pus or foul smelling drainage.  Inability to urinate or empty your bladder within 8 hours.  Problems with breathing or chest pain.    You should call 911 if you develop problems with breathing or chest pain.  If you are unable to contact your doctor or surgical center, you should go to the nearest emergency room or urgent care center.    Physician's telephone #: **[phone]*    If any questions arise, call  your doctor.  If your doctor is not available, please feel free to call the Surgical Center at [phone].  The Center is open Monday through Friday from 7AM to 7PM.  You can also call the HEALTH HOTLINE open 24 hours/day, 7 days/week and speak to a nurse at [phone], or toll free at [phone].    A registered nurse may call you a few days after your surgery to see how you are doing after your procedure.    MEDICATIONS: Resume taking daily medication.  Take prescribed pain medication with food.  If no medication is prescribed, you may take non-aspirin pain medication if needed.  PAIN MEDICATION CAN BE VERY CONSTIPATING.  Take a stool softener or laxative such as senokot, pericolace, or milk of magnesia if needed.    Prescription given for ***.  Last pain medication given at ***.    If your physician has prescribed pain medication that includes Acetaminophen (Tylenol), do not take additional Acetaminophen (Tylenol) while taking the prescribed medication.    Depression / Suicide Risk    As you are discharged from this [facility] facility, it is important to learn how to keep safe from harming yourself.    Recognize the warning signs:  · Abrupt changes in personality, positive or negative- including increase in energy   · Giving away possessions  · Change in eating patterns- significant weight changes-  positive or negative  · Change in sleeping patterns- unable to sleep or sleeping all the time   · Unwillingness or inability to communicate  · Depression  · Unusual sadness, discouragement and loneliness  · Talk of wanting to die  · Neglect of personal appearance   · Rebelliousness- reckless behavior  · Withdrawal from people/activities they love  · Confusion- inability to concentrate     If you or a loved one observes any of these behaviors or has concerns about self-harm, here's what you can do:  · Talk about it- your feelings and reasons for harming yourself  · Remove any means that you might use to  hurt yourself (examples: pills, rope, extension cords, firearm)  · Get professional help from the community (Mental Health, Substance Abuse, psychological counseling)  · Do not be alone:Call your Safe Contact- someone whom you trust who will be there for you.  · Call your local CRISIS HOTLINE 342-0747 or 249-615-8205  · Call your local Children's Mobile Crisis Response Team [place] (470) 682-5036 or www.[employer]  · Call the toll free National Suicide Prevention Hotlines   · National Suicide Prevention Lifeline 316-684-HHLZ (9168)  · National Hope Line Network 800-SUICIDE (824-2125)

## 2018-06-27 NOTE — OR NURSING
1600 Received report from Brooklyn BOSWELL, assumed care of pt at this time. Pt sleeping, stable. VS WNL. Spoke with Dr. Banks and obtained orders for observation admit.    1630 Report to floor ELBERT Marion, pt meets criteria for discharge from PACU.     1648 Pt to floor with transport.

## 2018-06-27 NOTE — OR NURSING
"1012 Pt transferred to PACU. Report received from OR RN and anesthesia. Pt is awake and alert. Appears to have no distress at this time. VS stable, respirations even and unlabored. Bejarano in place per MD order. Rox-pad in place, CDI.     1020 Pt medicated per MAR for pain 9/10. Pt tolerating sips of water. Pt states her baseline pain is 7/10. Educated on goals for postoperative pain management. Given blanket and heat pads for R hip pain.      1230 Handoff to ELBERT Bryan.     1310 Report back from ELBERT Bryan. Pt complains of more pain on R hip and refuses to sit edge of bed for voiding trial. Upon exam, quad strength equal bilaterally. Pulses 2+ throughout. Pt refuses to lift either leg stating \"it hurts too much\". Pt states \"this same thing has happened to me before while having sex.\"    1320 Dr. Banks called and spoke with patient- will have Dr. Pappas come bedside to examine patient when he is finished with his next surgery.      1350 Dr. Pappas at bedside to examine patient. Per MD order, patient is to be admitted overnight for observation. Keep bejarano overnight per MD.     1530 Pt medicated per MAR for pain 9/10. Pt tolerating sips of ginger ale without complaints of nausea. Repositioned- pt able to straighten leg on command.     1600 Report given to ELBERT Bryan.          "

## 2018-06-27 NOTE — OR NURSING
1240 Received pt from Brooklyn BOSWELL, assumed care of pt at this time. Pt awake, c/o pain 8/10 to right hip. Pt attempted to reposition for comfort.     1255 Attempted to see if pt was able to get out of bed to void. Pt unable to move right leg without 10/10 pain. Unable to complete voiding trial at this time.     1300 Report back to Brooklyn BOSWELL.

## 2018-06-27 NOTE — OP REPORT
DATE OF SERVICE:  06/27/2018    PREOPERATIVE DIAGNOSIS:  Stress urinary incontinence.    POSTOPERATIVE DIAGNOSIS:  Stress urinary incontinence.    PROCEDURE:  Transobturator tape.    SURGEON:  Chapin Banks MD    ANESTHESIOLOGIST:  William Pappas MD    ANESTHESIA TYPE:  General with ET tube.    ESTIMATED BLOOD LOSS:  25 mL.    INDICATIONS:  The patient is a 38-year-old G4, P1 with bothersome stress   urinary incontinence.  It has been a problem for 1 year.  She had a cough   stress test, which was positive.    PROCEDURE IN DETAIL:  The patient was taken to the operating room where her   general anesthesia was found to be adequate.  She was prepped and draped in   the normal sterile fashion and placed in the dorsal lithotomy position in   Tesfaye stirrups.  A Ritter catheter was placed in the vagina and the bladder was   drained for 20 mL of clear urine.  An Allis clamp was placed just inferior to   the urethra and another inferior to the bladder neck.  The vaginal mucosa was   infiltrated with 1% lidocaine with epinephrine along the track towards the   obturator canal.  The same was done on the contralateral side.  The mucosa was   opened with a scalpel and the fascia dissected off the mucosa with Metzenbaum   scissors.  The track was developed on both sides.  The obturator canal was   palpated in the groin and a stab wound was made on each side.  The trocar was   passed through the obturator canal and through the vaginal mucosa on the   patient's left.  No defect was noted in the vaginal mucosa.  The sling was   attached to the trocar and reversed through the track.  The same was done on   the patient's right.  Using a cervical dilator, some traction was applied to   the desired tightness.  The sling was trimmed.  The mucosa was irrigated.  The   Surgifoam was placed to confirm hemostasis.  The mucosa was closed with 2-0   Vicryl.  The skin incisions were closed with 4-0 chromic.  The patient   tolerated the  procedure well and was taken to the PACU in excellent condition.       ____________________________________     MD JEREMY Bermeo / STEPHEN    DD:  06/27/2018 10:43:45  DT:  06/27/2018 10:55:11    D#:  [id number]  Job#:  [id number]

## 2018-06-28 ENCOUNTER — PATIENT OUTREACH (OUTPATIENT)
Dept: HEALTH INFORMATION MANAGEMENT | Facility: OTHER | Age: 38
End: 2018-06-28

## 2018-06-28 LAB
ALBUMIN SERPL BCP-MCNC: 3.7 G/DL (ref 3.2–4.9)
ALBUMIN/GLOB SERPL: 1.5 G/DL
ALP SERPL-CCNC: 53 U/L (ref 30–99)
ALT SERPL-CCNC: 12 U/L (ref 2–50)
ANION GAP SERPL CALC-SCNC: 10 MMOL/L (ref 0–11.9)
AST SERPL-CCNC: 20 U/L (ref 12–45)
BASOPHILS # BLD AUTO: 0.2 % (ref 0–1.8)
BASOPHILS # BLD: 0.02 K/UL (ref 0–0.12)
BILIRUB SERPL-MCNC: 0.3 MG/DL (ref 0.1–1.5)
BUN SERPL-MCNC: 11 MG/DL (ref 8–22)
CALCIUM SERPL-MCNC: 9.2 MG/DL (ref 8.5–10.5)
CHLORIDE SERPL-SCNC: 107 MMOL/L (ref 96–112)
CO2 SERPL-SCNC: 24 MMOL/L (ref 20–33)
CREAT SERPL-MCNC: 0.66 MG/DL (ref 0.5–1.4)
EOSINOPHIL # BLD AUTO: 0.01 K/UL (ref 0–0.51)
EOSINOPHIL NFR BLD: 0.1 % (ref 0–6.9)
ERYTHROCYTE [DISTWIDTH] IN BLOOD BY AUTOMATED COUNT: 39.8 FL (ref 35.9–50)
GLOBULIN SER CALC-MCNC: 2.4 G/DL (ref 1.9–3.5)
GLUCOSE SERPL-MCNC: 112 MG/DL (ref 65–99)
HCT VFR BLD AUTO: 38.1 % (ref 37–47)
HGB BLD-MCNC: 12.7 G/DL (ref 12–16)
IMM GRANULOCYTES # BLD AUTO: 0.04 K/UL (ref 0–0.11)
IMM GRANULOCYTES NFR BLD AUTO: 0.3 % (ref 0–0.9)
LYMPHOCYTES # BLD AUTO: 1.26 K/UL (ref 1–4.8)
LYMPHOCYTES NFR BLD: 10.2 % (ref 22–41)
MAGNESIUM SERPL-MCNC: 2 MG/DL (ref 1.5–2.5)
MCH RBC QN AUTO: 29.8 PG (ref 27–33)
MCHC RBC AUTO-ENTMCNC: 33.3 G/DL (ref 33.6–35)
MCV RBC AUTO: 89.4 FL (ref 81.4–97.8)
MONOCYTES # BLD AUTO: 0.51 K/UL (ref 0–0.85)
MONOCYTES NFR BLD AUTO: 4.1 % (ref 0–13.4)
NEUTROPHILS # BLD AUTO: 10.54 K/UL (ref 2–7.15)
NEUTROPHILS NFR BLD: 85.1 % (ref 44–72)
NRBC # BLD AUTO: 0 K/UL
NRBC BLD-RTO: 0 /100 WBC
PHOSPHATE SERPL-MCNC: 4.2 MG/DL (ref 2.5–4.5)
PLATELET # BLD AUTO: 296 K/UL (ref 164–446)
PMV BLD AUTO: 10.6 FL (ref 9–12.9)
POTASSIUM SERPL-SCNC: 4 MMOL/L (ref 3.6–5.5)
PROT SERPL-MCNC: 6.1 G/DL (ref 6–8.2)
RBC # BLD AUTO: 4.26 M/UL (ref 4.2–5.4)
SODIUM SERPL-SCNC: 141 MMOL/L (ref 135–145)
T3 SERPL-MCNC: 62.2 NG/DL (ref 60–181)
T4 FREE SERPL-MCNC: 0.93 NG/DL (ref 0.53–1.43)
TSH SERPL DL<=0.005 MIU/L-ACNC: 0.27 UIU/ML (ref 0.38–5.33)
WBC # BLD AUTO: 12.4 K/UL (ref 4.8–10.8)

## 2018-06-28 PROCEDURE — 700102 HCHG RX REV CODE 250 W/ 637 OVERRIDE(OP): Performed by: INTERNAL MEDICINE

## 2018-06-28 PROCEDURE — 700102 HCHG RX REV CODE 250 W/ 637 OVERRIDE(OP): Performed by: NURSE PRACTITIONER

## 2018-06-28 PROCEDURE — 96376 TX/PRO/DX INJ SAME DRUG ADON: CPT

## 2018-06-28 PROCEDURE — 85025 COMPLETE CBC W/AUTO DIFF WBC: CPT

## 2018-06-28 PROCEDURE — 97161 PT EVAL LOW COMPLEX 20 MIN: CPT

## 2018-06-28 PROCEDURE — 96372 THER/PROPH/DIAG INJ SC/IM: CPT

## 2018-06-28 PROCEDURE — 700111 HCHG RX REV CODE 636 W/ 250 OVERRIDE (IP): Performed by: OBSTETRICS & GYNECOLOGY

## 2018-06-28 PROCEDURE — 36415 COLL VENOUS BLD VENIPUNCTURE: CPT

## 2018-06-28 PROCEDURE — 84100 ASSAY OF PHOSPHORUS: CPT

## 2018-06-28 PROCEDURE — 700102 HCHG RX REV CODE 250 W/ 637 OVERRIDE(OP): Performed by: OBSTETRICS & GYNECOLOGY

## 2018-06-28 PROCEDURE — A9270 NON-COVERED ITEM OR SERVICE: HCPCS | Performed by: NURSE PRACTITIONER

## 2018-06-28 PROCEDURE — 84439 ASSAY OF FREE THYROXINE: CPT

## 2018-06-28 PROCEDURE — 99233 SBSQ HOSP IP/OBS HIGH 50: CPT | Performed by: INTERNAL MEDICINE

## 2018-06-28 PROCEDURE — G8978 MOBILITY CURRENT STATUS: HCPCS | Mod: CJ

## 2018-06-28 PROCEDURE — 84443 ASSAY THYROID STIM HORMONE: CPT

## 2018-06-28 PROCEDURE — 97166 OT EVAL MOD COMPLEX 45 MIN: CPT

## 2018-06-28 PROCEDURE — 83735 ASSAY OF MAGNESIUM: CPT

## 2018-06-28 PROCEDURE — 80053 COMPREHEN METABOLIC PANEL: CPT

## 2018-06-28 PROCEDURE — 84480 ASSAY TRIIODOTHYRONINE (T3): CPT

## 2018-06-28 PROCEDURE — G8987 SELF CARE CURRENT STATUS: HCPCS | Mod: CJ

## 2018-06-28 PROCEDURE — G0378 HOSPITAL OBSERVATION PER HR: HCPCS

## 2018-06-28 PROCEDURE — G8979 MOBILITY GOAL STATUS: HCPCS | Mod: CI

## 2018-06-28 PROCEDURE — A9270 NON-COVERED ITEM OR SERVICE: HCPCS | Performed by: OBSTETRICS & GYNECOLOGY

## 2018-06-28 PROCEDURE — G8988 SELF CARE GOAL STATUS: HCPCS | Mod: CI

## 2018-06-28 PROCEDURE — 700105 HCHG RX REV CODE 258: Performed by: INTERNAL MEDICINE

## 2018-06-28 PROCEDURE — 700111 HCHG RX REV CODE 636 W/ 250 OVERRIDE (IP): Performed by: INTERNAL MEDICINE

## 2018-06-28 PROCEDURE — A9270 NON-COVERED ITEM OR SERVICE: HCPCS | Performed by: INTERNAL MEDICINE

## 2018-06-28 RX ORDER — HYDROCODONE BITARTRATE AND ACETAMINOPHEN 5; 325 MG/1; MG/1
1 TABLET ORAL EVERY 6 HOURS PRN
Status: DISCONTINUED | OUTPATIENT
Start: 2018-06-28 | End: 2018-06-29 | Stop reason: HOSPADM

## 2018-06-28 RX ORDER — HYDROCODONE BITARTRATE AND ACETAMINOPHEN 5; 325 MG/1; MG/1
1 TABLET ORAL ONCE
Status: COMPLETED | OUTPATIENT
Start: 2018-06-28 | End: 2018-06-28

## 2018-06-28 RX ORDER — KETOROLAC TROMETHAMINE 30 MG/ML
30 INJECTION, SOLUTION INTRAMUSCULAR; INTRAVENOUS EVERY 6 HOURS
Status: DISCONTINUED | OUTPATIENT
Start: 2018-06-28 | End: 2018-06-29

## 2018-06-28 RX ORDER — SODIUM CHLORIDE 9 MG/ML
INJECTION, SOLUTION INTRAVENOUS CONTINUOUS
Status: DISCONTINUED | OUTPATIENT
Start: 2018-06-28 | End: 2018-06-29 | Stop reason: HOSPADM

## 2018-06-28 RX ORDER — NICOTINE 21 MG/24HR
21 PATCH, TRANSDERMAL 24 HOURS TRANSDERMAL
Status: DISCONTINUED | OUTPATIENT
Start: 2018-06-28 | End: 2018-06-29 | Stop reason: HOSPADM

## 2018-06-28 RX ADMIN — HYDROCODONE BITARTRATE AND ACETAMINOPHEN 2 TABLET: 5; 325 TABLET ORAL at 10:14

## 2018-06-28 RX ADMIN — ENOXAPARIN SODIUM 40 MG: 100 INJECTION SUBCUTANEOUS at 08:55

## 2018-06-28 RX ADMIN — KETOROLAC TROMETHAMINE 30 MG: 30 INJECTION, SOLUTION INTRAMUSCULAR at 12:56

## 2018-06-28 RX ADMIN — SODIUM CHLORIDE: 9 INJECTION, SOLUTION INTRAVENOUS at 18:51

## 2018-06-28 RX ADMIN — HYDROCODONE BITARTRATE AND ACETAMINOPHEN 2 TABLET: 5; 325 TABLET ORAL at 03:55

## 2018-06-28 RX ADMIN — HYDROCODONE BITARTRATE AND ACETAMINOPHEN 1 TABLET: 5; 325 TABLET ORAL at 22:10

## 2018-06-28 RX ADMIN — VENLAFAXINE HYDROCHLORIDE 150 MG: 75 CAPSULE, EXTENDED RELEASE ORAL at 08:56

## 2018-06-28 RX ADMIN — KETOROLAC TROMETHAMINE 30 MG: 30 INJECTION, SOLUTION INTRAMUSCULAR at 17:25

## 2018-06-28 RX ADMIN — HYDROCODONE BITARTRATE AND ACETAMINOPHEN 1 TABLET: 5; 325 TABLET ORAL at 12:55

## 2018-06-28 RX ADMIN — VITAMIN D, TAB 1000IU (100/BT) 1000 UNITS: 25 TAB at 08:57

## 2018-06-28 RX ADMIN — HYDROCODONE BITARTRATE AND ACETAMINOPHEN 2 TABLET: 5; 325 TABLET ORAL at 17:23

## 2018-06-28 RX ADMIN — IBUPROFEN 800 MG: 800 TABLET, FILM COATED ORAL at 12:55

## 2018-06-28 RX ADMIN — GABAPENTIN 300 MG: 300 CAPSULE ORAL at 17:23

## 2018-06-28 RX ADMIN — LAMOTRIGINE 100 MG: 100 TABLET ORAL at 08:56

## 2018-06-28 RX ADMIN — GABAPENTIN 300 MG: 300 CAPSULE ORAL at 12:54

## 2018-06-28 RX ADMIN — GABAPENTIN 300 MG: 300 CAPSULE ORAL at 21:37

## 2018-06-28 RX ADMIN — DIAZEPAM 5 MG: 5 TABLET ORAL at 20:21

## 2018-06-28 RX ADMIN — NICOTINE 21 MG: 21 PATCH, EXTENDED RELEASE TRANSDERMAL at 14:35

## 2018-06-28 RX ADMIN — STANDARDIZED SENNA CONCENTRATE AND DOCUSATE SODIUM 2 TABLET: 8.6; 5 TABLET, FILM COATED ORAL at 21:37

## 2018-06-28 RX ADMIN — TRAZODONE HYDROCHLORIDE 150 MG: 50 TABLET ORAL at 23:13

## 2018-06-28 RX ADMIN — IBUPROFEN 800 MG: 800 TABLET, FILM COATED ORAL at 23:14

## 2018-06-28 RX ADMIN — GABAPENTIN 300 MG: 300 CAPSULE ORAL at 08:56

## 2018-06-28 ASSESSMENT — ENCOUNTER SYMPTOMS
MYALGIAS: 0
ORTHOPNEA: 0
SENSORY CHANGE: 1
PALPITATIONS: 0
NECK PAIN: 0
COUGH: 0
LOSS OF CONSCIOUSNESS: 0
FEVER: 0
BLURRED VISION: 0
DIARRHEA: 0
WEIGHT LOSS: 0
HEARTBURN: 0
NERVOUS/ANXIOUS: 1
SPUTUM PRODUCTION: 0
SHORTNESS OF BREATH: 0
PND: 0
BLOOD IN STOOL: 0
SORE THROAT: 0
VOMITING: 0
HEMOPTYSIS: 0
NAUSEA: 0
DIZZINESS: 0
INSOMNIA: 0
ABDOMINAL PAIN: 0
FALLS: 0
HALLUCINATIONS: 0
SEIZURES: 0
SPEECH CHANGE: 0
FOCAL WEAKNESS: 1
WHEEZING: 0
DEPRESSION: 1
MEMORY LOSS: 0
CONSTIPATION: 0
TINGLING: 1
BACK PAIN: 1
CHILLS: 0
DOUBLE VISION: 0
WEAKNESS: 1
FLANK PAIN: 0
STRIDOR: 0
CLAUDICATION: 0
HEADACHES: 0
SINUS PAIN: 0
DIAPHORESIS: 0

## 2018-06-28 ASSESSMENT — PAIN SCALES - GENERAL
PAINLEVEL_OUTOF10: 9
PAINLEVEL_OUTOF10: 8
PAINLEVEL_OUTOF10: 6
PAINLEVEL_OUTOF10: 9
PAINLEVEL_OUTOF10: 7
PAINLEVEL_OUTOF10: ASSUMED PAIN PRESENT
PAINLEVEL_OUTOF10: 9
PAINLEVEL_OUTOF10: 7
PAINLEVEL_OUTOF10: 9

## 2018-06-28 ASSESSMENT — COGNITIVE AND FUNCTIONAL STATUS - GENERAL
CLIMB 3 TO 5 STEPS WITH RAILING: A LOT
MOVING TO AND FROM BED TO CHAIR: UNABLE
MOVING FROM LYING ON BACK TO SITTING ON SIDE OF FLAT BED: UNABLE
WALKING IN HOSPITAL ROOM: A LITTLE
HELP NEEDED FOR BATHING: A LITTLE
STANDING UP FROM CHAIR USING ARMS: A LITTLE
SUGGESTED CMS G CODE MODIFIER DAILY ACTIVITY: CJ
MOBILITY SCORE: 11
SUGGESTED CMS G CODE MODIFIER MOBILITY: CL
TURNING FROM BACK TO SIDE WHILE IN FLAT BAD: UNABLE
DAILY ACTIVITIY SCORE: 21
TOILETING: A LITTLE
DRESSING REGULAR LOWER BODY CLOTHING: A LITTLE

## 2018-06-28 ASSESSMENT — LIFESTYLE VARIABLES: SUBSTANCE_ABUSE: 0

## 2018-06-28 ASSESSMENT — GAIT ASSESSMENTS
DEVIATION: ANTALGIC;DECREASED BASE OF SUPPORT;BRADYKINETIC;SHUFFLED GAIT;DECREASED HEEL STRIKE;DECREASED TOE OFF
ASSISTIVE DEVICE: FRONT WHEEL WALKER
GAIT LEVEL OF ASSIST: MINIMAL ASSIST
DISTANCE (FEET): 10

## 2018-06-28 ASSESSMENT — ACTIVITIES OF DAILY LIVING (ADL): TOILETING: INDEPENDENT

## 2018-06-28 NOTE — DISCHARGE PLANNING
Care Transition Team Assessment    Met with pt at bedside. According to pt she lives with her 8 yr old daughter who is currently with her paternal grandmother. 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 PT/OT eval pending. Pt confirmed if able to return home her Mum can provide transport.  will continue to follow for needs.    Information Source  Orientation : Oriented x 4  Information Given By: Patient  Who is responsible for making decisions for patient? : Patient    Readmission Evaluation  Is this a readmission?: No     Interdisciplinary Discharge Planning  Does Admitting Nurse Feel This Could be a Complex Discharge?:  (unsure, pt now has mobility issues)  Primary Care Physician: Dr Elvira Knutson  Lives with - Patient's Self Care Capacity: Child Less than 18 Years of Age  Patient or legal guardian wants to designate a caregiver (see row info): No  Support Systems: Parent  Housing / Facility: [address] [place] [address]  Do You Take your Prescribed Medications Regularly: Yes  Able to Return to Previous ADL's: Future Time w/Therapy  Mobility Issues: Yes  Prior Services: None  Patient Expects to be Discharged to:: home  Assistance Needed: Unknown at this Time  Durable Medical Equipment:  (None)    Discharge Preparedness  What is your plan after discharge?: Home with help  What are your discharge supports?: Parent  Prior Functional Level: Independent with Activities of Daily Living    Functional Assesment  Prior Functional Level: Independent with Activities of Daily Living    Finances  Prescription Coverage: Yes    Vision / Hearing Impairment  Right Eye Vision: Wears Glasses  Left Eye Vision: Wears Glasses    Values / Beliefs / Concerns  Spiritual Requests During Hospitalization: No     Domestic Abuse  Have you ever been the victim of abuse or violence?: No  Physical Abuse or Sexual Abuse: No  Verbal Abuse or Emotional Abuse: No  Possible Abuse Reported to::  Not Applicable     Discharge Risks or Barriers  Patient risk factors: Other (comment) (Single Mum living with 8 yr old daughter)    Anticipated Discharge Information  Discharge Contact Phone Number: Sister Americo Lau [phone]

## 2018-06-28 NOTE — CARE PLAN
Problem: Pain Management  Goal: Pain level will decrease to patient's comfort goal  Outcome: PROGRESSING AS EXPECTED      Problem: Mobility  Goal: Risk for activity intolerance will decrease  Outcome: PROGRESSING SLOWER THAN EXPECTED

## 2018-06-28 NOTE — PROGRESS NOTES
Patient back from CT before 2200. Assessment complete. Still complaining of right leg numbness/tingling. Pain medication given. NPO at midnight per verbal order from dr. Banks. All questions answered at this time. Patient understands plan of care for shift. Call light within reach.

## 2018-06-28 NOTE — CONSULTS
"CC:  Cant move my hip, terrible pain in right hip since surgery, nobody taking me seriously    Date of Admission: 6/27/2018    Today's Date: 06/27/18    Consulting Physician: Chapin Banks M.D.      HPI:    Florencia Lau is a 38 y.o. female notes TLKW 1000 prior to surgery for bladder sling today, awoke sometime thereafter about 1045 noted right hip pain moderate felt unable to move due to pain but as time passed it worsened now feels as though whole lower body isn't attached due to pain can't bear to budge lower extremities or blinded with pain.  Can feel them however, and can move ankles normally. hasnt had BM yet she states. Bladder state no change.  No other complaints.       ROS:   Constitutional: No fevers or chills.  Eyes: No blurry vision or eye pain.  ENT: No dysphagia or hearing loss.  Respiratory: No cough or shortness of breath.  Cardiovascular: No chest pain or palpitations.  GI: No nausea, vomiting, or diarrhea.  : No urinary incontinence or dysuria.  Musculoskeletal: No joint swelling or arthralgias.  Skin: No skin rashes.  Neuro: See HPI.  Endocrine: No heat or cold intolerance. No polydipsia or polyuria.  Psych: No depression or anxiety.  Heme/Lymph: No easy bruising or swollen lymph nodes.  All other systems were reviewed and were negative.       Past Medical History:   Past Medical History:   Diagnosis Date   • Hayfever 06/12/2018   • Muscle spasm 06/12/2018    \"My neurologist told me I have a muscle spasm disorder\"   • Stress incontinence 06/12/2018   • Indigestion 06/12/2018   • Bowel habit changes 06/12/2018    \"Constipation/Diarrhea\"   • Anxiety and depression 06/12/2018   • Anxiety    • Back pain    • Bronchitis     HX OF   • Depression    • Fibromyalgia    • Insomnia    • Neck pain    • Panic attacks    • Shoulder pain    • TMJ syndrome        Past Surgical History:   Past Surgical History:   Procedure Laterality Date   • BLADDER SUSPENSION  6/27/2018    Procedure: BLADDER " SUSPENSION-   FOR: TRANS OBTURATOR TAPE;  Surgeon: Chapin Banks M.D.;  Location: SURGERY SAME DAY [facility];  Service: Gynecology   • OTHER      neck abscess   • TONSILLECTOMY     • TUBAL LIGATION         Social History:   Social History     Social History   • Marital status: Single     Spouse name: N/A   • Number of children: N/A   • Years of education: N/A     Occupational History   • Not on file.     Social History Main Topics   • Smoking status: Current Every Day Smoker     Packs/day: 0.50     Types: Cigarettes   • Smokeless tobacco: Never Used      Comment: 1 PK per day   • Alcohol use No   • Drug use: No   • Sexual activity: Not on file     Other Topics Concern   • Not on file     Social History Narrative   • No narrative on file       Family History:   Family History   Problem Relation Age of Onset   • Diabetes     • Allergies       RA   • Other       DIVERTICULITIS, LUPUS, DDD, FM       Allergies:   Allergies   Allergen Reactions   • Codeine Vomiting   • Latex Rash   • Sulfa Drugs          Current Facility-Administered Medications:   •  lactated ringers infusion, , Intravenous, Continuous, Chapin Banks M.D., Last Rate: 10 mL/hr at 06/27/18 0900  •  ketorolac (TORADOL) injection 30 mg, 30 mg, Intravenous, Q6HRS PRN, Chapin Banks M.D.  •  ondansetron (ZOFRAN) syringe/vial injection 4 mg, 4 mg, Intravenous, Q6HRS PRN, Chapin Banks M.D.  •  diazePAM (VALIUM) tablet 5 mg, 5 mg, Oral, Q6HRS PRN, Chapin Banks M.D.  •  gabapentin (NEURONTIN) capsule 600 mg, 600 mg, Oral, 4X/DAY, Chapin Banks M.D.  •  lamoTRIgine (LAMICTAL) tablet 100 mg, 100 mg, Oral, DAILY, Chapin Banks M.D.  •  [START ON 6/28/2018] venlafaxine XR (EFFEXOR XR) capsule 150 mg, 150 mg, Oral, QDAY with Breakfast, Chapin Bnaks M.D.      PHYSICAL EXAM    Vitals:    06/27/18 1548 06/27/18 1618 06/27/18 1711 06/27/18 1726   BP:    113/62   Pulse: 79 83  68   Resp: 16 16  16   Temp:    36.3 °C (97.3 °F)   SpO2: 97% 94%   "96%   Weight:   79.1 kg (174 lb 6.1 oz)    Height:   1.702 m (5' 7\")        Head/Neck: NCAT no meningismus neg kernig neg brudzinski. No obvious mass or heard bruit. No tender arteries or lost pulses.  No rash of head or neck.  Skin: Warm, dry, intact. No rashes observed head/neck or body  Eyes/Funduscopic: unable    Mental Status: Awake, alert, oriented x 3. Names/repeats/fluent/follows commands. No neglect/extinction. Attention and concentration, recent & remote memory, Fund of Knowledge wnl.  Cranial Nerves: CN II-XII intact. PERRL.   No afferent pupillary defect. EOM full. VF full. sym grimace & eye closure. No nystagmus.     Motor: bulk/tone wnl. Cant move at hips due to pain right only slightly screams with pain, left can against my hand quite strong but givesway with pain, this after much suggestion as at first couldn't move lower ext at all, then noted ankles full strength which I confirmed, then noted able to f/e knees as well so long as doesn't change hip angle(bed angled at knees helps this aspect of exam). Littlejohn present bilaterally. Can AB/Adduct normal.  Strongly functional exam, otherwise intact and sym. no abn mvmts.   Sensory: Intact light touch & sharp without sensory level except notes abnormal more painful but less sensate right hip and thigh not a dermatomal pattern. Sharp startle to feet has sym withdraw response.  Coordination: finger to nose intact.   DTR's: intact/sym. no clonus. Toes down sym.  Gait/Station: n/a fall concern       Labs:                          No results for input(s): SODIUM, POTASSIUM, CHLORIDE, CO2, GLUCOSE, BUN, CPKTOTAL in the last 72 hours.  No results for input(s): SODIUM, POTASSIUM, CHLORIDE, CO2, BUN, CREATININE, MAGNESIUM, PHOSPHORUS, CALCIUM in the last 72 hours.      No results found for this or any previous visit.           Imaging: neuroimaging reviewed and directly visualized by me  CT-CSPINE WITHOUT PLUS RECONS    (Results Pending)   CT-TSPINE W/O PLUS " RECONS    (Results Pending)   CT-LSPINE W/O PLUS RECONS    (Results Pending)   MR-CERVICAL SPINE-WITH & W/O    (Results Pending)   MR-THORACIC SPINE-WITH & W/O    (Results Pending)   MR-LUMBAR SPINE-WITH & W/O    (Results Pending)   MR-PELVIS-WITH & W/O AND SEQUENCES    (Results Pending)       Assessment/Plan:    ACUTE PAIN/ LOWER EXTREMITY WEAKNESS/SENSORY LOSS, STRONGLY FUNCTIONAL EXAM    STAT CT & MR OF TOTAL SPINE  NOT STEROID CANDIDATE IF COMPRESSION MYELOPATHY AS OUT OF 8HR WINDOW  NSG/NEURO STAT FOR ANY ACUTE FINDINGS IF FOUND ON SPINAL IMAGING  SPINAL PRECAUTIONS/CARE WITH TRANSFERS    WILL FOLLOW FOR RESULTS      Bassam Solano M.D.  , Neurohospitalist & Stroke  Clinical Professor, [employer]  Diplomate, Neurology & Neuroimaging

## 2018-06-28 NOTE — THERAPY
"Occupational Therapy Evaluation completed.   Functional Status:  Pt s/p trans obturator tape procedure for urinary incontinence, post surgery, pt c/o of significant BLE pain, Rt>Lt, limiting pt's ability to perform functional mobility. Pt required min a for supine to sit utilizing log rolling method and use of bed rail, LB dressing min a required to thread Rt LE due to significant pain level with attempt to lift RLE, performed BSC t/f with cga/min a with cues for proper techniques and inconsistent report of pain level with t/f, noted able to wt-shift to L hip in sitting to perform pericare, able to stand w/o BUE support to perform pericare cleanup in standing with inconsistent pain and R knee buckling. Pt reports concerns about returning home, as mother works during daytime and pt has 8yr old who cannot physically assist pt if needed, inconsistent pain limiting pt's safety and endurance at this time. Pt would benefit from short post acute rehab prior to d/c home to address above mentioned deficits and verbalizes she does not feel safe d/c home at this time.   Plan of Care: Will benefit from Occupational Therapy 3 times per week  Discharge Recommendations:  Equipment: Will Continue to Assess for Equipment Needs. Post-acute therapy Discharge to a transitional care facility for continued skilled therapy services.    See \"Rehab Therapy-Acute\" Patient Summary Report for complete documentation.    "

## 2018-06-28 NOTE — CONSULTS
[facility] Medicine History and Physical    Date of Service  6/27/2018    Chief Complaint  Post operative admission for fibromylagia per report    Consultation requested by: Chapin Banks M.D.    History of Presenting Illness  38 y.o. female who presented 6/27/2018 for an elective trans-obturator tape surgery for stress urinary incontinence with Chapin Banks M.D. postoperatively from the PACU, CDU was called by the nurses because patient refused to discharge and complained of lower extremity weakness and patient reported that she wanted to be admitted overnight.  No other report was given.  Per surgical team, Chapin Banks M.D. hospitalist to take over management in the CDU given lack of surgical beds in the surgical unit.  Upon arrival to the CDU patient was evaluated by me.  Upon evaluation this patient reported that she walked into the operating room and following her surgery when she woke up she had intractable bilateral hip pain more profound in the right hip.  She reported that she now has weakness.  She reported she is unable to move her right leg and is somewhat able to move her left leg.  She reports that she is unable to bear any weight.  She reported that she is bedbound now.  Reports history of chronic fibromyalgia and chronic muscle spasms.  She reports that my surgeon cut my nerve in the hip area.  She reports I am very short of this.  She was counseled and educated.  Patient reports numbness and tingling which is more profound in the right lower extremity but also in the left lower extremity.  Denies any saddle anesthesia.  No bowel incontinence.  Still has a Ritter catheter in place and refuses to take this out because she is unable to bear any weight at this point in time.  Otherwise no upper extremity weakness.  No dysphagia.  No dysarthria.  No aphasia.  Speech is normal.  No facial droop is noted.  Denies any other complaints at this point in time.  Reports intractable pain in the hip  "area and requesting the use of narcotics.  Counseled and educated that IV narcotics will not be prescribed at this point.  Will order Norco and these will be weaned off tomorrow.  Surgical team can order narcotics if indicated per postoperative course.  Exam reveals that patient is not engaging in neurological exam.  Given concern for CNS trauma I consulted Dr. Solano from neurology.    Primary Care Physician  Elvira Knutson M.D.    Consultants  Neurology - Dr Solano    Code Status  FULL CODE    Review of Systems  Review of Systems   Constitutional: Positive for malaise/fatigue. Negative for chills, diaphoresis and fever.   HENT: Negative for hearing loss and tinnitus.    Eyes: Negative for blurred vision and double vision.   Respiratory: Negative for cough, hemoptysis, sputum production, shortness of breath and wheezing.    Cardiovascular: Negative for chest pain, palpitations, orthopnea, claudication, leg swelling and PND.   Gastrointestinal: Negative for abdominal pain, blood in stool, constipation, diarrhea, heartburn, melena, nausea and vomiting.   Genitourinary:        Ritter in place   Musculoskeletal: Positive for joint pain. Negative for back pain, falls, myalgias and neck pain.   Skin: Negative for itching and rash.   Neurological: Positive for tingling, sensory change and focal weakness. Negative for dizziness, tremors, speech change, seizures, loss of consciousness, weakness and headaches.   Psychiatric/Behavioral: Positive for depression. Negative for hallucinations, memory loss, substance abuse and suicidal ideas. The patient has insomnia. The patient is not nervous/anxious.         Past Medical History  Past Medical History:   Diagnosis Date   • Hayfever 06/12/2018   • Muscle spasm 06/12/2018    \"My neurologist told me I have a muscle spasm disorder\"   • Stress incontinence 06/12/2018   • Indigestion 06/12/2018   • Bowel habit changes 06/12/2018    \"Constipation/Diarrhea\"   • Anxiety and depression " 2018   • Anxiety    • Back pain    • Bronchitis     HX OF   • Depression    • Fibromyalgia    • Insomnia    • Neck pain    • Panic attacks    • Shoulder pain    • TMJ syndrome        Surgical History  Past Surgical History:   Procedure Laterality Date   • BLADDER SUSPENSION  2018    Procedure: BLADDER SUSPENSION-   FOR: TRANS OBTURATOR TAPE;  Surgeon: Chapin Banks M.D.;  Location: SURGERY SAME DAY [facility];  Service: Gynecology   • OTHER      neck abscess   • TONSILLECTOMY     • TUBAL LIGATION         Medications  No current facility-administered medications on file prior to encounter.      Current Outpatient Prescriptions on File Prior to Encounter   Medication Sig Dispense Refill   • ibuprofen (MOTRIN) 800 MG Tab TAKE 1 TABLET BY MOUTH EVERY 8 HOURS AS NEEDED 30 Tab 0   • tizanidine (ZANAFLEX) 4 MG Tab TAKE 1 TABLET BY MOUTH EVERY 6 HOURS AS NEEDED 30 Tab 3   • trazodone (DESYREL) 150 MG Tab TAKE 1 TAB BY MOUTH AT BEDTIME AS NEEDED FOR SLEEP. 30 Tab 1   • albuterol 108 (90 BASE) MCG/ACT Aero Soln inhalation aerosol Inhale 2 Puffs by mouth every 6 hours as needed for Shortness of Breath. 8.5 g 3       Family History  Family History   Problem Relation Age of Onset   • Diabetes     • Allergies       RA   • Other       DIVERTICULITIS, LUPUS, DDD, FM       Social History  Social History   Substance Use Topics   • Smoking status: Current Every Day Smoker     Packs/day: 0.50     Types: Cigarettes   • Smokeless tobacco: Never Used      Comment: 1 PK per day   • Alcohol use No       Allergies  Allergies   Allergen Reactions   • Latex Rash   • Sulfa Drugs    • Codeine Vomiting        Physical Exam  Laboratory   Hemodynamics  Temp (24hrs), Av.4 °C (97.5 °F), Min:36.3 °C (97.3 °F), Max:36.6 °C (97.9 °F)   Temperature: 36.3 °C (97.4 °F)  Pulse  Av.8  Min: 60  Max: 100 Heart Rate (Monitored): 62  Blood Pressure: (!) 99/58 (will notify RN), NIBP: 103/51      Respiratory      Respiration: 16, Pulse  Oximetry: 93 %             Physical Exam   Constitutional: She is oriented to person, place, and time. She appears well-developed and well-nourished. No distress.   HENT:   Head: Normocephalic.   Mouth/Throat: Oropharynx is clear and moist. No oropharyngeal exudate.   Eyes: Conjunctivae are normal. Pupils are equal, round, and reactive to light. No scleral icterus.   Neck: No JVD present.   Cardiovascular: Normal rate, regular rhythm and normal heart sounds.  Exam reveals no gallop and no friction rub.    No murmur heard.  Pulmonary/Chest: Breath sounds normal. No stridor. No respiratory distress. She has no wheezes. She has no rales.   Abdominal: Soft. Bowel sounds are normal. She exhibits no distension. There is no tenderness. There is no rebound and no guarding.   Musculoskeletal: Normal range of motion. She exhibits no edema, tenderness or deformity.   Neurological: She is alert and oriented to person, place, and time. No cranial nerve deficit.   Cranial nerves I to II are intact and without any abnormalities.  Patient is alert and oriented ×4.  There is no dysphagia, dysarthria or expressive aphasia.  No pronator drift in upper extremities.  Motor strength is 5 out of 5 in bilateral upper extremities with normal sensation and normal deep tendon reflexes in bilateral upper extremities.  Tone is normal.  Exam of lower extremity reveals that deep tendon reflexes are +2 in bilateral lower extremities and completely normal.  Tone is normal in lower extremities.  With respect to motor exam patient is not engaging.  She is able to move her feet without any problem.  Is unable to lift her legs against gravity more profound in the right lower extremity.  Littlejohn's sign and abductor sign are positive bilaterally and patient is not engaging in neurological examination at this point in time.   Skin: Skin is warm and dry. She is not diaphoretic.   Psychiatric: She has a normal mood and affect. Her behavior is normal.  Judgment and thought content normal.               No results for input(s): ALTSGPT, ASTSGOT, ALKPHOSPHAT, TBILIRUBIN, DBILIRUBIN, GAMMAGT, AMYLASE, LIPASE, ALB, PREALBUMIN, GLUCOSE in the last 72 hours.              No results found for: TROPONINI  Urinalysis:    Lab Results  Component Value Date/Time   SPECGRAVITY 1.024 10/30/2017 1845   GLUCOSEUR Negative 10/30/2017 1845   KETONES 15 (A) 10/30/2017 1845   NITRITE Negative 10/30/2017 1845   WBCURINE 5-10 (A) 10/30/2017 1845   RBCURINE 2-5 (A) 10/30/2017 1845   BACTERIA Few (A) 10/30/2017 1845   EPITHELCELL Few 10/30/2017 1845        Imaging  Reviewed   Assessment/Plan     I anticipate this patient is appropriate for observation status at this time.    * Stress incontinence- (present on admission)   Assessment & Plan    Status post surgical intervention on June 27, 2018  Postoperative/further recommendations per gynecology team        Lower extremity weakness- (present on admission)   Assessment & Plan    Patient reports intractable bilateral hip pain  Reports development of bilateral lower extremity weakness, numbness and tingling postoperatively  She reports my surgeon Nicked my nerve  Reports she was fine preoperatively  Reports unable to bear any weight  Exam reveals a positive miller's and abductor sign concerning for non engagement  History of chronic pain and fibromyalgia  Deep tendon reflexes are normal, no saddle anesthesia, no bowel incontinence, Ritter catheter in place at this time    Atypical presentation  I emergently discussed the case after patient arrived to the CDU with neurologist Dr. Solano  Per neurology recommendations I ordered stat CT cervical, thoracic and lumbar spine  Per neurology recommendations I ordered stat MRI cervical, thoracic, lumbar and pelvis  We will obtain PT/OT evaluation  Further recommendations per neurology team  If any concerns on imaging will obtain stat neurosurgical consultation        Recurrent major depressive  disorder, in partial remission (HCC)- (present on admission)   Assessment & Plan    Continue at home regimen  Recommend outpatient psychiatric follow-up        Fibromyalgia- (present on admission)   Assessment & Plan    Continue at home regimen        Chronic pain syndrome- (present on admission)   Assessment & Plan    Continue at home regimen  For now on Norco postoperative  Wean off opiates tomorrow            VTE prophylaxis: SCD.    Time spend: 90 minutes. > 50 % time spend providing counseling / co ordination of care.

## 2018-06-28 NOTE — ASSESSMENT & PLAN NOTE
Status post surgical intervention on June 27, 2018  Postoperative/further recommendations per gynecology team

## 2018-06-28 NOTE — THERAPY
Physical Therapy Evaluation completed.   Bed Mobility:  Supine to Sit: Minimal Assist  Transfers: Sit to Stand: Minimal Assist  Gait: Level Of Assist: Minimal Assist with Front-Wheel Walker       Plan of Care: Will benefit from Physical Therapy 3 times per week and Plan to complete next treatment by Friday 6/29  Discharge Recommendations: Equipment: Will Continue to Assess for Equipment Needs. Post-acute therapy Discharge to a transitional care facility for continued skilled therapy services.    Pt is a 38 year old female admitted to the hospital following trans obturator tape procedure for urinary incontinence. Following surgery, pt reports significant pain down her R LE and inability to ambulate. Pt reports that prior to admit, she was independent with all mobility and ADL's, although her fibromyalgia limits her endurance due to pain and muscle spasms. At time of initial evaluation, pt reported 8/10 pain in R LE. Pt required minimal assist for supine to sit via log roll to the L. Once in upright sitting, pt offloading R hip and sitting with majority of weight to the L. Pt did have mild R LE weakness but also did not seem to give full effort when attempting manual muscle testing. Pt required anywhere from CGA to minimal assistance for sit to stand. Once standing, pt had several unexpected instances of R knee buckling. Pt ambulated short distance in room with use of FWW with anywhere from CGA to minimal assist. At first, pt not placing any weight through R LE, but slowly was able to place and accept weight through R LE without R knee buckling. Pt also stood without UE support on FWW to wipe after using the restroom. Pt did appear to have pain but level of pain stated was often inconsistent with pt's functional ability. Pt feels that she can not DC home at this time. Pt states she can stay with her mom but her mom works during the day. Due to pain and current level of function, pt would benefit from short term SNF  "stay to maximize functional mobility and independence prior to DC home    See \"Rehab Therapy-Acute\" Patient Summary Report for complete documentation.     "

## 2018-06-28 NOTE — PROGRESS NOTES
Case discussed with Dr. Solano from neurology  Stat CT spine images requested  Stat MR spine and pelvis requested  Detailed consult note to follow

## 2018-06-28 NOTE — ASSESSMENT & PLAN NOTE
Spinal etiologies have been ruled out   If a post operative complication defer further recommendations to surgical team   Discussed with Dr. Sloano, can be discharged from medical/neurology perspective  Recommend outpatient EMG, if persistent symptoms  Patient informed regarding recommendation

## 2018-06-28 NOTE — DISCHARGE PLANNING
Spoke To: RANDY Aranda  Agency/Facility Name: [facility]  Plan or Request: faxed PT OT notes. Done

## 2018-06-28 NOTE — DISCHARGE PLANNING
Agency/Facility Name: [facility]  Spoke To: Laurence  Outcome: Patient declined due to no skill need.

## 2018-06-28 NOTE — PROGRESS NOTES
Patient up on the floor. Complain right leg pain and un able to walk because of her pain. Was walking this morning per patient. Discuss to patient about plan of care.

## 2018-06-28 NOTE — DISCHARGE PLANNING
"Telephoned Edenilson at [facility] who confirmed she did not have Auth at this time and did not know if pt would have copay. Edenilson advised daily rate for room and board was $215/day with $50 per unit of 15 minutes of therapy, cost of medications would need to be worked out.    Informed pt of this cost, pt said that she was unable to afford this. Pt said that she had been speaking with Dr Banks and according to pt she said Dr Banks told her she could have the sling removed or she may have nerve damage. Pt wants to be able to stay a \"couple of days\" so she can receive IV Toradol and rest. Dr Mar informed.  "

## 2018-06-28 NOTE — PROGRESS NOTES
"GYN MD Progress Note    S:  POD#1 s/p TOT for FLORINDA admission for intractable groin pain R>L with associated motor defects, weakness with abduction and adduction. States is minimally improved this AM. Also states she has had this in the past, states when she has intercourse she has a lot of hip/groin pain afterwards and often times she or her partner has to help her close her legs. She has a hx of fibromyalgia and \"muscle spasm disorder\" for which she takes gabapentin and 2 muscle relaxers.  Pain fairly well controlled. Not ambulating. Bejarano catheter in place. Tolerating PO. Passing flatus.  Requesting to go outside for a cigarette.    O: /62   Pulse 68   Temp 36.6 °C (97.9 °F)   Resp 16   Ht 1.702 m (5' 7\")   Wt 79.1 kg (174 lb 6.1 oz)   LMP 2018 Comment: hcg pending   SpO2 93%   Breastfeeding? No   BMI 27.31 kg/m²     Recent Labs      18   0401   WBC  12.4*   RBC  4.26   HEMOGLOBIN  12.7   HEMATOCRIT  38.1   MCV  89.4   MCH  29.8   RDW  39.8   PLATELETCT  296   MPV  10.6   NEUTSPOLYS  85.10*   LYMPHOCYTES  10.20*   MONOCYTES  4.10   EOSINOPHILS  0.10   BASOPHILS  0.20     Lab Results   Component Value Date/Time    SODIUM 141 2018 04:01 AM    POTASSIUM 4.0 2018 04:01 AM    CHLORIDE 107 2018 04:01 AM    CO2 24 2018 04:01 AM    GLUCOSE 112 (H) 2018 04:01 AM    BUN 11 2018 04:01 AM    CREATININE 0.66 2018 04:01 AM    CREATININE 0.9 2008 12:50 PM      MRI performed but not read.    PE:  In no acute distress, resting comfortably in bed  CVS:  RRR  Pulm: un-labored breathing  Abd: soft, NT/ND  : peripad clean and dry, bejarano in place  Ext: tender in B inguinal areas, weakness with abduction/adduction, NT, no edema    A/P: Florencia Lau is a 37 YO  woman POD #1 s/p uncomplicated TOT procedure with R>L groin pain and motor weakness. States is slightly improved this AM. Lengthy discussion with pt this AM about possible obturator nerve " "irritation/injury associated with sling. Discussed about 10% of patients have groin pain after surgery and typically it resolves with time. The patient states she feels this is not related to the surgery as she has had these symptoms prior (as described above) an thinks the surgery has exacerbated them. She is very grateful for admission with work-up from Hospital team and Neurologist as she thinks her symptoms are related to her fibromyalgia and \"muscle spasm disease.\" Regardless, I offered to remove this sling this morning, she declines. We also discussed possible nerve injury from lithotomy position however I reassured her we carefully place patients and that I double checked her positioning myself and also that her procedure was only 15-20 minutes. She expressed understanding.   Plan to do voiding trial this am and remove bejarano as it is a nidus for infection, she agrees.  Plan nicotine patch,  Plan regular diet.    Chapin Banks M.D.      "

## 2018-06-28 NOTE — DISCHARGE PLANNING
Agency/Facility Name: [facility]  Outcome: Patient accepted    RANDY Valles notifed

## 2018-06-28 NOTE — CARE PLAN
Problem: Pain Management  Goal: Pain level will decrease to patient's comfort goal  Pt will report pain of less than 4/10.    Problem: Mobility  Goal: Risk for activity intolerance will decrease    Intervention: Encourage patient to increase activity level in collaboration with Interdisciplinary Team  Pt will ambulate in the halls today.

## 2018-06-28 NOTE — DISCHARGE PLANNING
Agency/Facility Name: [facility]  Spoke To: Gaby  Outcome: Need therapy notes faxed. Done

## 2018-06-28 NOTE — DISCHARGE PLANNING
Received Choice form at 9959  Agency/Facility Name: [facility], [facility], [facility], and [facility]  Referral sent per Choice form @ [id number]

## 2018-06-28 NOTE — DISCHARGE PLANNING
Met with pt to discuss SNF choices, choice form provided and [facility] chose [facility], [facility], [facility] and [facility]. Faxed form to [facility].    SELVIN Adhikari received call from [facility] that pt would have a copay of $1,700. Pt informed of the copay and she said that she was unable to afford this as she lived pay cheque to pay cheque. Advised pt Home Health PT could be arranged, pt replied that she needed someone with her as she was unable to walk. Suggested maybe pt could stay with her mother, pt replied that her mother worked and she would be alone during the day. Pt was unhappy that her insurance would not fully cover SNF and asked if she could remain in the hospital. Advised pt this writer would inform Dr Mar

## 2018-06-28 NOTE — PROGRESS NOTES
Pain and movement improving per patient.  Patient said she always has has some kind on numbness and tingling in her extremities, and believes she is getting closer to baseline. Blood sent down to lab.

## 2018-06-29 VITALS
DIASTOLIC BLOOD PRESSURE: 63 MMHG | RESPIRATION RATE: 15 BRPM | OXYGEN SATURATION: 95 % | SYSTOLIC BLOOD PRESSURE: 120 MMHG | TEMPERATURE: 98.1 F | WEIGHT: 174.38 LBS | HEART RATE: 79 BPM | BODY MASS INDEX: 27.37 KG/M2 | HEIGHT: 67 IN

## 2018-06-29 LAB
ERYTHROCYTE [DISTWIDTH] IN BLOOD BY AUTOMATED COUNT: 41.3 FL (ref 35.9–50)
HCT VFR BLD AUTO: 37.1 % (ref 37–47)
HGB BLD-MCNC: 12 G/DL (ref 12–16)
MCH RBC QN AUTO: 29.3 PG (ref 27–33)
MCHC RBC AUTO-ENTMCNC: 32.3 G/DL (ref 33.6–35)
MCV RBC AUTO: 90.5 FL (ref 81.4–97.8)
PLATELET # BLD AUTO: 246 K/UL (ref 164–446)
PMV BLD AUTO: 10.6 FL (ref 9–12.9)
RBC # BLD AUTO: 4.1 M/UL (ref 4.2–5.4)
WBC # BLD AUTO: 6.1 K/UL (ref 4.8–10.8)

## 2018-06-29 PROCEDURE — 700102 HCHG RX REV CODE 250 W/ 637 OVERRIDE(OP): Performed by: OBSTETRICS & GYNECOLOGY

## 2018-06-29 PROCEDURE — G0378 HOSPITAL OBSERVATION PER HR: HCPCS

## 2018-06-29 PROCEDURE — 700102 HCHG RX REV CODE 250 W/ 637 OVERRIDE(OP): Performed by: INTERNAL MEDICINE

## 2018-06-29 PROCEDURE — 700102 HCHG RX REV CODE 250 W/ 637 OVERRIDE(OP): Performed by: NURSE PRACTITIONER

## 2018-06-29 PROCEDURE — 700105 HCHG RX REV CODE 258: Performed by: INTERNAL MEDICINE

## 2018-06-29 PROCEDURE — 36415 COLL VENOUS BLD VENIPUNCTURE: CPT

## 2018-06-29 PROCEDURE — A9270 NON-COVERED ITEM OR SERVICE: HCPCS | Performed by: INTERNAL MEDICINE

## 2018-06-29 PROCEDURE — 700111 HCHG RX REV CODE 636 W/ 250 OVERRIDE (IP): Performed by: OBSTETRICS & GYNECOLOGY

## 2018-06-29 PROCEDURE — 96376 TX/PRO/DX INJ SAME DRUG ADON: CPT

## 2018-06-29 PROCEDURE — A9270 NON-COVERED ITEM OR SERVICE: HCPCS | Performed by: NURSE PRACTITIONER

## 2018-06-29 PROCEDURE — 85027 COMPLETE CBC AUTOMATED: CPT

## 2018-06-29 PROCEDURE — 97530 THERAPEUTIC ACTIVITIES: CPT

## 2018-06-29 PROCEDURE — A9270 NON-COVERED ITEM OR SERVICE: HCPCS | Performed by: OBSTETRICS & GYNECOLOGY

## 2018-06-29 RX ORDER — NAPROXEN 500 MG/1
500 TABLET ORAL
Qty: 30 TAB | Refills: 0 | Status: SHIPPED | OUTPATIENT
Start: 2018-06-29 | End: 2018-09-02

## 2018-06-29 RX ORDER — KETOROLAC TROMETHAMINE 30 MG/ML
30 INJECTION, SOLUTION INTRAMUSCULAR; INTRAVENOUS ONCE
Status: COMPLETED | OUTPATIENT
Start: 2018-06-29 | End: 2018-06-29

## 2018-06-29 RX ADMIN — STANDARDIZED SENNA CONCENTRATE AND DOCUSATE SODIUM 2 TABLET: 8.6; 5 TABLET, FILM COATED ORAL at 11:05

## 2018-06-29 RX ADMIN — VITAMIN D, TAB 1000IU (100/BT) 1000 UNITS: 25 TAB at 11:04

## 2018-06-29 RX ADMIN — TIZANIDINE 4 MG: 4 TABLET ORAL at 06:13

## 2018-06-29 RX ADMIN — TIZANIDINE 4 MG: 4 TABLET ORAL at 17:47

## 2018-06-29 RX ADMIN — IBUPROFEN 800 MG: 800 TABLET, FILM COATED ORAL at 14:16

## 2018-06-29 RX ADMIN — VENLAFAXINE HYDROCHLORIDE 150 MG: 75 CAPSULE, EXTENDED RELEASE ORAL at 11:06

## 2018-06-29 RX ADMIN — IBUPROFEN 800 MG: 800 TABLET, FILM COATED ORAL at 05:56

## 2018-06-29 RX ADMIN — GABAPENTIN 300 MG: 300 CAPSULE ORAL at 17:45

## 2018-06-29 RX ADMIN — GABAPENTIN 300 MG: 300 CAPSULE ORAL at 16:12

## 2018-06-29 RX ADMIN — GABAPENTIN 300 MG: 300 CAPSULE ORAL at 11:05

## 2018-06-29 RX ADMIN — KETOROLAC TROMETHAMINE 30 MG: 30 INJECTION, SOLUTION INTRAMUSCULAR at 16:12

## 2018-06-29 RX ADMIN — DIAZEPAM 5 MG: 5 TABLET ORAL at 12:24

## 2018-06-29 RX ADMIN — HYDROCODONE BITARTRATE AND ACETAMINOPHEN 1 TABLET: 5; 325 TABLET ORAL at 17:45

## 2018-06-29 RX ADMIN — NICOTINE 21 MG: 21 PATCH, EXTENDED RELEASE TRANSDERMAL at 05:56

## 2018-06-29 RX ADMIN — SODIUM CHLORIDE: 9 INJECTION, SOLUTION INTRAVENOUS at 06:13

## 2018-06-29 RX ADMIN — KETOROLAC TROMETHAMINE 30 MG: 30 INJECTION, SOLUTION INTRAMUSCULAR at 00:04

## 2018-06-29 RX ADMIN — HYDROCODONE BITARTRATE AND ACETAMINOPHEN 1 TABLET: 5; 325 TABLET ORAL at 11:05

## 2018-06-29 RX ADMIN — LAMOTRIGINE 100 MG: 100 TABLET ORAL at 11:07

## 2018-06-29 RX ADMIN — KETOROLAC TROMETHAMINE 30 MG: 30 INJECTION, SOLUTION INTRAMUSCULAR at 05:55

## 2018-06-29 ASSESSMENT — GAIT ASSESSMENTS
ASSISTIVE DEVICE: FRONT WHEEL WALKER
DISTANCE (FEET): 10
GAIT LEVEL OF ASSIST: STAND BY ASSIST
DEVIATION: ANTALGIC;DECREASED BASE OF SUPPORT;OTHER (COMMENT)

## 2018-06-29 ASSESSMENT — COGNITIVE AND FUNCTIONAL STATUS - GENERAL
MOBILITY SCORE: 18
CLIMB 3 TO 5 STEPS WITH RAILING: A LITTLE
STANDING UP FROM CHAIR USING ARMS: A LITTLE
SUGGESTED CMS G CODE MODIFIER MOBILITY: CK
MOVING TO AND FROM BED TO CHAIR: A LITTLE
MOVING FROM LYING ON BACK TO SITTING ON SIDE OF FLAT BED: A LITTLE
WALKING IN HOSPITAL ROOM: A LITTLE
TURNING FROM BACK TO SIDE WHILE IN FLAT BAD: A LITTLE

## 2018-06-29 ASSESSMENT — PAIN SCALES - GENERAL: PAINLEVEL_OUTOF10: 8

## 2018-06-29 NOTE — THERAPY
"Physical Therapy Treatment completed.   Bed Mobility:  Supine to Sit: Stand by Assist  Transfers: Sit to Stand: Stand by Assist  Gait: Level Of Assist: Stand by Assist with Front-Wheel Walker       Plan of Care: Will benefit from Physical Therapy 3 times per week and Plan to complete next treatment by Monday 7/2  Discharge Recommendations: Equipment: Front-Wheel Walker. Post-acute therapy Discharge to home with outpatient or home health for additional skilled therapy services.    Pt demonstrating significant improvements with mobility today compared to initial evaluation. Pt performed all mobility at SBA level today. Pt only agreeable to ambulate from EOB to bedside commode across the room. Pt initially placing very little weight through R LE, but progressing to placing full weight through R LE. No overt LOB or knee buckling today. PT declined ambulating in hallways due to pain. Pt was able to flex R hip in bed to achieve hook lying position to assist with rolling. Seated EOB, pt able to lift R hip against gravity but declined any external resistance by PT to assess for strength due to pain. Pt still feels that she can not DC home safely and she needs \"Help\" to walk. Pt currently at SBA for all mobility. Due to pain, pt mobility at home may be limited, however, pt is currently functionally capable of DC home with FWW     See \"Rehab Therapy-Acute\" Patient Summary Report for complete documentation.       "

## 2018-06-29 NOTE — PROGRESS NOTES
Pt medicated for sleep and pain. Up to commode, bleeding slowing down. IVF continue. Pt getting OOB with just standby assist now. Call light within reach.

## 2018-06-29 NOTE — DISCHARGE PLANNING
Received Choice form at 116  Agency/Facility Name: [facility]  Referral sent per Choice form @ 0040

## 2018-06-29 NOTE — DISCHARGE PLANNING
Received Choice form at 4278  Agency/Facility Name: [facility]  Referral sent per Choice form @ 6591

## 2018-06-29 NOTE — DISCHARGE PLANNING
Edenilson from [facility] called and informed writer that co-payment for patient would be $1700.00 flat fee.  This is the pretty stable across the board with care facilities and this rate is consistent with each admission.

## 2018-06-29 NOTE — CARE PLAN
Problem: Mobility  Goal: Risk for activity intolerance will decrease    Intervention: Assess and monitor signs of activity intolerance  Pt walked with walker to bathroom,c/o pain.

## 2018-06-29 NOTE — DISCHARGE PLANNING
Agency/Facility Name: [facility]  Spoke To: Reina   Outcome:  Unable to locate order.  Order has been re faxed to [facility] at [phone].

## 2018-06-29 NOTE — CARE PLAN
Problem: Pain Management  Goal: Pain level will decrease to patient's comfort goal    Intervention: Follow pain managment plan developed in collaboration with patient and Interdisciplinary Team  Pt medicated as ordered,pt wants more pain medicines,explained to pt contraindication of taking Toradol and ibuprofen,

## 2018-06-29 NOTE — PROGRESS NOTES
[facility]    Date of Service: 6/28/2018    Chief Complaint  This is a 38 y.o. female here for an elective trans-obturator tape surgery for stress urinary incontinence with Chapin Banks M.D. following surgery patient refused to discharge home and requested to be admitted for observation because of weakness and inability to bear weight.  She was admitted to the CDU for observation.  Upon evaluation there was concern for spinal pathology, neurology consultation was obtained and patient was evaluated by Dr. Solano.  Patient underwent emergent evaluation with CT scans of the cervical, thoracic and lumbar spines which did not reveal any acute pathology.  We subsequently obtain MRI of the cervical, lumbar, thoracic and and pelvis which did not reveal any acute abnormalities.  On physical examination patient was noted to have a positive Littlejohn's sign and abductor sign which was concerning for potential nonengagement, malingering and secondary gain.  Irrespectively we obtained further imaging evaluation to ensure no other abnormalities or pathologies were noted.  These are indeed negative.  Otherwise patient has declined any saddle anesthesia, there is no stool incontinence, no urinary incontinence.  Deep tendon reflexes are completely normal and her tone is completely normal.  She was evaluated by Chapin Banks M.D. again on June 28, 2018.  Her surgeon has offered her to remove the trans-obturator tape, discussed the timeline for its removal but patient has refused to proceed with this at this point in time.  Per Chapin Banks M.D. her operating room time/lithotomy position time was very short and should not be causing any obturator nerve dysfunction.  Per Chapin Banks M.D. sometimes patient may get obturator nerve dysfunction with this surgery, which usually improves on its own over over time. Chapin Banks M.D. has discussed the anticipated postoperative course, complications with  the patient.  On June 28, 2018 Ritter catheter has been removed and a voiding trial has been provided to the patient and she has voided well.  Otherwise patient has been provided narcotic regimen per the surgical team.  I have informed the patient that her pain control would be per the surgical team and I would not prescribe any narcotics.  She is advised to follow-up with her primary care physician within 1 week of hospital discharge and the surgical team per surgical team's recommendations.  She was evaluated by physical therapy and occupational therapy on June 28, 2018.  She was again found to be not engaged in physical therapy and Occupational Therapy evaluations.  For evaluation she is able to bear weight and walk with a front-wheeled walker.  Recommendations from physical therapy and Occupational Therapy have been for postacute placement to a skilled nursing facility.  I have discussed the case with Dr. Solano from neurology on June 28, 2008 and from neurology perspective patient has been cleared for discharge with recommendations to follow up with neurology in the outpatient setting and consider an EMG if symptoms are persistent.  I have discussed the case with hospital schedulers and advised arrangement of outpatient follow-up with primary care physician within 1 week of hospital discharge and neurology team at the earliest possible.  She has been cleared for discharge from surgical standpoint also but she is refusing to be discharged as discussed below       Interval Problem Update  Patient complains of persistent weakness.  Medically she is cleared for discharge but she is refusing to be discharged home  Skilled nursing facility placement was discussed.  Patient does not want to go to a SNF because she does not want to pay the co-pay.    I had detailed and extensive discussion with the patient.  I confronted the patient in front of patient's nursing staff Laurence salas and case management Rosi.  I  informed the patient that the symptoms she was manifesting yesterday do not fit an underlying clinical picture.  I discussed the findings of her CT studies and MR studies.  I informed the patient that the studies were negative for any acute pathology.  Exam has been different between me and neurology team.  She has been noticed to be curling her legs at will.  She has been extremely angry with me.  She informed me that she persistently believes that her surgeon nicked something.  When she has seen her surgeon she tells her that she believes that this is an exacerbation of her underlying fibromyalgia.    With her changing interval history, physical exam and providing different details to the healthcare providers I highly suspect there is underlying component of secondary gain/malingering/psychiatric disorder as opposed to an organic etiology.    From medical/neurology perspective there is no spinal pathology.    I do not have expertise if this is a postsurgical complication.  I have informed the patient regarding this.    I have informed, Chapin Banks M.D patient's primary treatment team that at this point in time patient is cleared for discharge from medical/neurology perspective.  Patient is reluctant for discharge.  She will not accept a skilled nursing facility placement.  Given I am not the primary attending physician, I will defer discharge planning to the primary team.  From medical perspective I would recommend following up on her leukocytosis tomorrow with interval CBC.  I have informed Chapin Banks M.D. regarding this.  IV fluid hydration for the next 12 hours.  Pain management per surgical team.  At this point hospital medicine will sign off, please do not hesitate to consult us for any future questions or concerns.      Consultants/Specialty  Hospital medicine consulting and signing off 06/28/18    Disposition  Per primary team         Review of Systems   Constitutional: Positive for  malaise/fatigue. Negative for chills, diaphoresis, fever and weight loss.   HENT: Negative for congestion, ear discharge, ear pain, hearing loss, nosebleeds, sinus pain, sore throat and tinnitus.    Eyes: Negative for blurred vision and double vision.   Respiratory: Negative for cough, hemoptysis, sputum production, shortness of breath, wheezing and stridor.    Cardiovascular: Negative for chest pain, palpitations, orthopnea, claudication, leg swelling and PND.   Gastrointestinal: Negative for abdominal pain, blood in stool, constipation, diarrhea, heartburn, melena, nausea and vomiting.   Genitourinary: Negative for dysuria, flank pain, frequency, hematuria and urgency.   Musculoskeletal: Positive for back pain and joint pain. Negative for falls, myalgias and neck pain.   Skin: Negative for itching and rash.   Neurological: Positive for tingling, sensory change, focal weakness and weakness. Negative for dizziness, speech change, seizures, loss of consciousness and headaches.   Psychiatric/Behavioral: Positive for depression. Negative for hallucinations, memory loss, substance abuse and suicidal ideas. The patient is nervous/anxious. The patient does not have insomnia.       Physical Exam  Laboratory/Imaging   Hemodynamics  Temp (24hrs), Av.6 °C (97.8 °F), Min:36.1 °C (97 °F), Max:36.9 °C (98.4 °F)   Temperature: 36.9 °C (98.4 °F)  Pulse  Av.7  Min: 60  Max: 100    Blood Pressure: (!) 97/49      Respiratory      Respiration: 16, Pulse Oximetry: 98 %, O2 Daily Delivery Respiratory : Room Air with O2 Available     Work Of Breathing / Effort: Mild  RUL Breath Sounds: Diminished, RML Breath Sounds: Diminished, RLL Breath Sounds: Diminished, SOLE Breath Sounds: Diminished, LLL Breath Sounds: Diminished    Fluids    Intake/Output Summary (Last 24 hours) at 18 4978  Last data filed at 18 1028   Gross per 24 hour   Intake                0 ml   Output             1700 ml   Net            -1700 ml        Nutrition  Orders Placed This Encounter   Procedures   • Diet Order Regular (lactase intolerance)     Standing Status:   Standing     Number of Occurrences:   1     Order Specific Question:   Diet:     Answer:   Regular [1]     Comments:   lactase intolerance     Physical Exam   Constitutional: She is oriented to person, place, and time. She appears well-developed and well-nourished. No distress.   HENT:   Head: Normocephalic.   Mouth/Throat: Oropharynx is clear and moist. No oropharyngeal exudate.   Eyes: Conjunctivae are normal. Pupils are equal, round, and reactive to light. No scleral icterus.   Neck: No JVD present.   Cardiovascular: Normal rate, regular rhythm and normal heart sounds.  Exam reveals no gallop and no friction rub.    No murmur heard.  Pulmonary/Chest: Breath sounds normal. No stridor. No respiratory distress. She has no wheezes. She has no rales.   Abdominal: Soft. Bowel sounds are normal. She exhibits no distension. There is no tenderness. There is no rebound and no guarding.   Musculoskeletal: Normal range of motion. She exhibits no edema, tenderness or deformity.   Neurological: She is alert and oriented to person, place, and time. No cranial nerve deficit.   Cranial nerves are intact without any abnormalities.  No pronator drift in upper extremities.  No dysarthria.  No aphasia.  No dysphagia.  Power is 5 out of 5 in bilateral upper extremities without any sensory deficits.    No saddle anesthesia reported by the patient.  No loss of bladder or bowel function.    Patient does not engage with respect to examination of the lower extremities.  She reports intractable pain in bilateral hip areas.  Most profound in the right hip area.  Littlejohn's and abductor signs are positive.  She would not abduct.  Power is merely 2 out of 5 in right lower extremity and 4 out of 5 in the left lower extremity, all muscle group apart from She is able to move her feet with 5 out of 5 strength at her ankle  and toes.  There is no medial thigh sensory loss.  She reports numbness and tingling in the skin inferior to the knee.  No groin pain but most pain in the right hip area, right thigh and lower extremities.  Symptoms are more profound on the right side.  Tone of lower extremities normal.  Deep tendon reflexes are normal.  Overall difficult to assess sensory system and motor system because she has waxing and waning symptoms.    Skin: Skin is warm and dry. She is not diaphoretic.   Psychiatric: She has a normal mood and affect. Her behavior is normal. Judgment and thought content normal.       Recent Labs      06/28/18   0401   WBC  12.4*   RBC  4.26   HEMOGLOBIN  12.7   HEMATOCRIT  38.1   MCV  89.4   MCH  29.8   MCHC  33.3*   RDW  39.8   PLATELETCT  296   MPV  10.6     Recent Labs      06/28/18   0401   SODIUM  141   POTASSIUM  4.0   CHLORIDE  107   CO2  24   GLUCOSE  112*   BUN  11   CREATININE  0.66   CALCIUM  9.2                      Assessment/Plan     * Stress incontinence- (present on admission)   Assessment & Plan    Status post surgical intervention on June 27, 2018  Postoperative/further recommendations per gynecology team        Lower extremity weakness- (present on admission)   Assessment & Plan    Spinal etiologies have been ruled out   If a post operative complication defer further recommendations to surgical team   Discussed with Dr. Solano, can be discharged from medical/neurology perspective  Recommend outpatient EMG, if persistent symptoms  Patient informed regarding recommendation        Recurrent major depressive disorder, in partial remission (HCC)- (present on admission)   Assessment & Plan    Continue at home regimen  Recommend outpatient psychiatric follow-up        Fibromyalgia- (present on admission)   Assessment & Plan    Continue at home regimen        Chronic pain syndrome- (present on admission)   Assessment & Plan    Continue at home regimen  Postoperative regimen per surgical team             Time spend: 45 minutes. > 50 % time spend providing counseling / co ordination of care.    Quality-Core Measures   Reviewed items::  Labs reviewed, Medications reviewed and Radiology images reviewed  Ritter catheter::  No Ritter  DVT prophylaxis pharmacological::  Enoxaparin (Lovenox)  DVT prophylaxis - mechanical:  SCDs  Ulcer Prophylaxis::  Not indicated  Assessed for rehabilitation services:  Patient was assess for and/or received rehabilitation services during this hospitalization

## 2018-06-29 NOTE — FACE TO FACE
Face to Face Note  -  Durable Medical Equipment    Chapin Banks M.D. - NPI: [id number]  I certify that this patient is under my care and that they have had a durable medical equipment(DME)face to face encounter by myself that meets the physician DME face-to-face encounter requirements with this patient on:    Date of encounter:   Patient:                    MRN:                       YOB: 2017  Florencia Lau  [id number]  1980     The encounter with the patient was in whole, or in part, for the following medical condition, which is the primary reason for durable medical equipment:  Post-Op Surgery    I certify that, based on my findings, the following durable medical equipment is medically necessary:  Walkers and 3 in 1 Bedside Comode.    HOME O2 Saturation Measurements:(Values must be present for Home Oxygen orders)         ,     ,         My Clinical findings support the need for the above equipment due to:  Abnormal Gait    Supporting Symptoms: post-operative abnormal gait     ------------------------------------------------------------------------------------------------------------------    Face to Face Supporting Documentation - Home Health    The encounter with this patient was in whole or in part the primary reason for home health admission.    Date of encounter:   Patient:                    MRN:                       YOB: 2017  Florencia Lau  [id number]  1980     Home health to see patient for:  Skilled Nursing care for assessment, interventions & education, Home health aide, Physical Therapy evaluation and treatment and Occupational therapy evaluation and treatment    Skilled need for:  Surgical Aftercare monitor gait/weakness    Skilled nursing interventions to include:  Comment: monitoring of post-op recovery    Homebound evidenced status by:  Need the aid of supportive devices such as crutches, canes, wheelchairs or walkers or  Needs the assistance of another person in order to leave the home. Leaving home must require a considerable and taxing effort. There must exist a normal inability to leave the home.    Community Physician to provide follow up care: Elvira Knutson M.D.     Optional Interventions    Wound information & treatment:    Home Infusion Therapy orders:    Line/Drain/Airway:    I certify the face to face encounter for this home care referral meets the CMS requirements and the encounter/clinical assessment with the patient was, in whole, or in part, for the medical condition(s) listed above, which is the primary reason for home health care. Based on my clinical findings: the service(s) are medically necessary, support the need for home health care, and the homebound criteria are met.  I certify that this patient has had a face to face encounter by myself.  Chapin Banks M.D. - NPI: [id number]    *Debility, frailty and advanced age in the absence of an acute deterioration or exacerbation of a condition do not qualify a patient for home health.

## 2018-06-29 NOTE — DISCHARGE PLANNING
Received Choice form at 1202  Agency/Facility Name: [facility]  Referral sent per Choice form @ 1200

## 2018-06-29 NOTE — DISCHARGE SUMMARY
"  Discharge Summary:      Florencia Lau      Admit Date:   2018  Discharge Date:  2018     Admitting diagnosis:  STRESS URINARY INCONTINENCE  Female stress incontinence  Female stress incontinence  Discharge Diagnosis: Status post TOT for FLORINDA c/b RLE weakness and pain.        History:  Past Medical History:   Diagnosis Date   • Anxiety    • Anxiety and depression 2018   • Back pain    • Bowel habit changes 2018    \"Constipation/Diarrhea\"   • Bronchitis     HX OF   • Depression    • Fibromyalgia    • Hayfever 2018   • Indigestion 2018   • Insomnia    • Muscle spasm 2018    \"My neurologist told me I have a muscle spasm disorder\"   • Neck pain    • Panic attacks    • Shoulder pain    • Stress incontinence 2018   • TMJ syndrome      OB History   No data available        Latex; Sulfa drugs; and Codeine  Patient Active Problem List    Diagnosis Date Noted   • Lower extremity weakness 2018     Priority: High   • Suicidal intent 2016     Priority: High   • Stress incontinence 2018     Priority: Medium   • Fibromyalgia 2018     Priority: Low   • Recurrent major depressive disorder, in partial remission (HCC) 2018     Priority: Low   • Chronic pain syndrome 2017     Priority: Low   • Inflammatory arthritis 2017   • Psychophysiological insomnia 2017   • Hypokalemia 2016   • Hypomagnesemia 2016   • Overdose 2016        Hospital Course:   Florencia Lau is 38 y.o.  woman was admitted post-operatively after TOT with intractable pain and RLE weakness. She was seen by hospitalist and neurology team and had work up which demonstrated no CNS defect. We discussed possibility of obturator nerve injury related to surgery which is a known complication and should improve spontaneously. On POD #2 she is ambulating with walker and states symptoms are improving, albeit slowly. She declines to have the sling " removed.     Vitals:    06/28/18 1910 06/28/18 2312 06/29/18 0333 06/29/18 0811   BP: 120/71 118/66 (!) 91/52 (!) 98/55   Pulse: 75 76 64 68   Resp: 16 18 16 12   Temp: 37.1 °C (98.7 °F) 36.3 °C (97.4 °F) 36.2 °C (97.2 °F) 36.2 °C (97.2 °F)   SpO2: 94% 97% 96% 95%   Weight:       Height:           Current Facility-Administered Medications   Medication Dose   • nicotine (NICODERM) 21 MG/24HR 21 mg  21 mg   • NS infusion     • HYDROcodone-acetaminophen (NORCO) 5-325 MG per tablet 1 Tab  1 Tab   • venlafaxine XR (EFFEXOR XR) capsule 150 mg  150 mg   • albuterol inhaler 2 Puff  2 Puff   • diazePAM (VALIUM) tablet 5 mg  5 mg   • gabapentin (NEURONTIN) capsule 300 mg  300 mg   • ibuprofen (MOTRIN) tablet 800 mg  800 mg   • lamoTRIgine (LAMICTAL) tablet 100 mg  100 mg   • tizanidine (ZANAFLEX) tablet 4 mg  4 mg   • traZODone (DESYREL) tablet 150 mg  150 mg   • vitamin D (cholecalciferol) tablet 1,000 Units  1,000 Units   • senna-docusate (PERICOLACE or SENOKOT S) 8.6-50 MG per tablet 2 Tab  2 Tab    And   • polyethylene glycol/lytes (MIRALAX) PACKET 1 Packet  1 Packet    And   • magnesium hydroxide (MILK OF MAGNESIA) suspension 30 mL  30 mL    And   • bisacodyl (DULCOLAX) suppository 10 mg  10 mg   • Respiratory Care per Protocol     • ondansetron (ZOFRAN) syringe/vial injection 4 mg  4 mg   • ondansetron (ZOFRAN ODT) dispertab 4 mg  4 mg   • acetaminophen (TYLENOL) tablet 650 mg  650 mg       Exam:  General:  NAD, resting comfortably in chair       Labs:  Recent Labs      06/28/18   0401  06/29/18   0600   WBC  12.4*  6.1   RBC  4.26  4.10*   HEMOGLOBIN  12.7  12.0   HEMATOCRIT  38.1  37.1   MCV  89.4  90.5   MCH  29.8  29.3   MCHC  33.3*  32.3*   RDW  39.8  41.3   PLATELETCT  296  246   MPV  10.6  10.6        Activity:   Discharge to home with DME walker and commode  PT to continue to work with patient prior to evening discharge  Pt will follow up Monday and agrees if symptoms are not improved we weill plan to remove the  TOT    Assessment:  POD #2 s/p uncomplicated TOT for FLORINDA with RLE pain, weakness and abnormal gait    Follow up: .Dr Banks on Monday     Discharge Meds:   Current Outpatient Prescriptions   Medication Sig Dispense Refill   • naproxen (NAPROSYN) 500 MG Tab Take 1 Tab by mouth 3 times a day, with meals. 30 Tab 0   • oxyCODONE-acetaminophen (PERCOCET) 5-325 MG Tab Take 1-2 Tabs by mouth every 6 hours as needed for up to 3 days. 20 Tab 0       Chapin Banks M.D.

## 2018-06-29 NOTE — DISCHARGE PLANNING
Agency/Facility Name: [facility]  Spoke To: Laurence  Outcome: Patient declined can't meet needs

## 2018-06-29 NOTE — PROGRESS NOTES
Bedside report received, pt resting comfortably in bed, no needs at this time. Call light within reach.

## 2018-06-29 NOTE — DISCHARGE PLANNING
Voice message left for Edenilson at [facility] at office  ([phone]) and cell phone ([phone]) to call this writer regarding Auth for admission.

## 2018-06-29 NOTE — DISCHARGE PLANNING
Agency/Facility Name: [facility]  Spoke To: Reina  Outcome: They have accepted patient on service.  They state cost will be $73.40 for out of pocket. Per Reina they will need to speak wit patient before the will deliver equipment. ER RN SELVIN Aranda advised.

## 2018-06-29 NOTE — PROGRESS NOTES
Pt in bed sleeping,PT going to work with her,medicated for pain at 6 am,poc explained,pt still c/o RLE pain on moving.

## 2018-06-29 NOTE — DISCHARGE PLANNING
Pt has orders for DC to home with home health, provided choice, pt chose [facility], choice form faxed to [facility]. Walker and commode order, provided choice form, faxed to [facility].     Pt confirmed she is agreeable to go home and confirmed her Mum will provide transport. Pt advised [facility] will contact her to set up visits, provided pt with contact number for [facility]. No other needs identified at this time. Pt said that she spoke with Dr Banks this morning and plans to be discharged home to her Mum at 6pm.

## 2018-06-29 NOTE — DISCHARGE PLANNING
Agency/Facility Name: [facility]  Spoke To: Romelia  Outcome: They have accepted patient on service.

## 2018-06-29 NOTE — DISCHARGE PLANNING
Advised by Esperanza ([phone]) from pt's insurance that she would have a copay of $73.45 for a commode. Esperanza said that she had called patient and left a voice message. Informed pt of the copay and she said that would purchase a commode.     Pt then said that she wanted to speak with the neurologist that had seen her earlier in the day. Advised pt this writer would inform her RN. Informed pt's RN of patient's request.

## 2018-06-29 NOTE — PROGRESS NOTES
S- very upset with surgeon feels before surgery was fine, after surgery terrible pain to move her right hip feels related to operation.  No other complaints.     O-   Allergies:   Allergies   Allergen Reactions   • Latex Rash   • Sulfa Drugs    • Codeine Vomiting         Current Facility-Administered Medications:   •  ketorolac (TORADOL) injection 30 mg, 30 mg, Intravenous, Once, Chapin Banks M.D.  •  nicotine (NICODERM) 21 MG/24HR 21 mg, 21 mg, Transdermal, Daily-0600, FORREST BardalesP.RMarybelN., 21 mg at 06/29/18 0556  •  NS infusion, , Intravenous, Continuous, Dianna Mar M.D., Last Rate: 125 mL/hr at 06/29/18 0640  •  HYDROcodone-acetaminophen (NORCO) 5-325 MG per tablet 1 Tab, 1 Tab, Oral, Q6HRS PRN, Dianna Mar M.D., 1 Tab at 06/29/18 1105  •  venlafaxine XR (EFFEXOR XR) capsule 150 mg, 150 mg, Oral, QDAY with Breakfast, Chapin Banks M.D., 150 mg at 06/29/18 1106  •  albuterol inhaler 2 Puff, 2 Puff, Inhalation, Q6HRS PRN, Dianna Mar M.D.  •  diazePAM (VALIUM) tablet 5 mg, 5 mg, Oral, Q6HRS PRN, Dianna Mar M.D., 5 mg at 06/29/18 1224  •  gabapentin (NEURONTIN) capsule 300 mg, 300 mg, Oral, 4X/DAY, Dainna Mar M.D., 300 mg at 06/29/18 1105  •  ibuprofen (MOTRIN) tablet 800 mg, 800 mg, Oral, Q6HRS PRN, Dianna Mar M.D., 800 mg at 06/29/18 1416  •  lamoTRIgine (LAMICTAL) tablet 100 mg, 100 mg, Oral, DAILY, Dianna Mar M.D., 100 mg at 06/29/18 1107  •  tizanidine (ZANAFLEX) tablet 4 mg, 4 mg, Oral, Q6HRS PRN, Dianna Mar M.D., 4 mg at 06/29/18 0613  •  traZODone (DESYREL) tablet 150 mg, 150 mg, Oral, QHS, Dianna Mar M.D., 150 mg at 06/28/18 2313  •  vitamin D (cholecalciferol) tablet 1,000 Units, 1,000 Units, Oral, DAILY, Dianna Mar M.D., 1,000 Units at 06/29/18 1104  •  senna-docusate (PERICOLACE or SENOKOT S) 8.6-50 MG per tablet 2 Tab, 2 Tab, Oral, BID, 2 Tab at 06/29/18 1105 **AND** polyethylene glycol/lytes (MIRALAX) PACKET 1 Packet, 1 Packet, Oral, QDAY PRN **AND** magnesium hydroxide  (MILK OF MAGNESIA) suspension 30 mL, 30 mL, Oral, QDAY PRN **AND** bisacodyl (DULCOLAX) suppository 10 mg, 10 mg, Rectal, QDAY PRN, Dianna Mar M.D.  •  Respiratory Care per Protocol, , Nebulization, Continuous RT, Dianna Mar M.D.  •  ondansetron (ZOFRAN) syringe/vial injection 4 mg, 4 mg, Intravenous, Q4HRS PRN, Dianna Mar M.D.  •  ondansetron (ZOFRAN ODT) dispertab 4 mg, 4 mg, Oral, Q4HRS PRN, Dianna Mar M.D.  •  acetaminophen (TYLENOL) tablet 650 mg, 650 mg, Oral, Q6HRS PRN, Dianna Mar M.D.      PHYSICAL EXAM    Vitals:    06/29/18 0333 06/29/18 0811 06/29/18 1128 06/29/18 1530   BP: (!) 91/52 (!) 98/55 112/59 120/63   Pulse: 64 68 67 79   Resp: 16 12 14 15   Temp: 36.2 °C (97.2 °F) 36.2 °C (97.2 °F) 36.6 °C (97.8 °F) 36.7 °C (98.1 °F)   SpO2: 96% 95% 100% 95%   Weight:       Height:           Head/Neck: NCAT no meningismus neg kernig neg brudzinski. No obvious mass or heard bruit. No tender arteries or lost pulses.  No rash of head or neck.  Skin: Warm, dry, intact. No rashes observed head/neck or body     Mental Status: Awake, alert, oriented x 3. Names/repeats/fluent/follows commands. No neglect/extinction. Attention and concentration, recent & remote memory, Fund of Knowledge wnl.  Cranial Nerves: CN II-XII intact. PERRL 3MM.   No afferent pupillary defect. EOM full. VF full. sym grimace & eye closure. No nystagmus.                           Motor: bulk/tone wnl. As I find her she has legs up knees bent and lying on left side, she notes now able to move legs, left leg normally, right leg cant abduct/lift towards right due to pain will not attempt as she is finally comfortable in this knees bent legs left position.  no abn mvmts. she can flex extend at hips knees and ankles seemingly full and normal, reticent to do counterpressure, right slow seems functional  Sensory: Intact light touch & sharp without sensory level   Coordination: finger to nose intact.   DTR's: intact/sym. no clonus. Toes down  sym.  Gait/Station: n/a fall concern        Labs:  Recent Labs      06/28/18   0401  06/29/18   0600   WBC  12.4*  6.1   RBC  4.26  4.10*   HEMOGLOBIN  12.7  12.0   HEMATOCRIT  38.1  37.1   MCV  89.4  90.5   MCH  29.8  29.3   MCHC  33.3*  32.3*   RDW  39.8  41.3   PLATELETCT  296  246   MPV  10.6  10.6     Recent Labs      06/28/18   0401   SODIUM  141   POTASSIUM  4.0   CHLORIDE  107   CO2  24   GLUCOSE  112*   BUN  11   CREATININE  0.66   CALCIUM  9.2                     Recent Labs      06/28/18   0401   SODIUM  141   POTASSIUM  4.0   CHLORIDE  107   CO2  24   GLUCOSE  112*   BUN  11     Recent Labs      06/28/18   0401   SODIUM  141   POTASSIUM  4.0   CHLORIDE  107   CO2  24   BUN  11   CREATININE  0.66   MAGNESIUM  2.0   PHOSPHORUS  4.2   CALCIUM  9.2         No results found for this or any previous visit.           Imaging: neuroimaging reviewed and directly visualized by me  MR-PELVIS-WITH & W/O AND SEQUENCES   Final Result      1.  Postoperative change consistent with recent bladder sling.   2.  LEFT ovarian follicles.   3.  No pelvic abscess demonstrated.   4.  No abnormal marrow edema or enhancement.      MR-LUMBAR SPINE-WITH & W/O   Final Result      MRI LUMBAR SPINE WITHOUT CONTRAST WITHIN NORMAL LIMITS.      MR-THORACIC SPINE-WITH & W/O   Final Result      1.  MRI of the thoracic spine without and with contrast within normal limits except for minor Schmorl's node endplate irregularities at T8-T9 and T9-T10.   2.  No significant disc bulge or protrusion, central stenosis, or foraminal stenosis at any thoracic level.   3.  No myelopathic cord signal abnormality at any thoracic level.      MR-CERVICAL SPINE-WITH & W/O   Final Result      1.  C5-C6 tiny midline disc protrusion which barely contacts the ventral thecal sac. No central or foraminal stenosis at any cervical level.   2.  No evidence of demyelinating lesion in the cervical spinal cord.      CT-LSPINE W/O PLUS RECONS   Final Result         1.  No  acute traumatic bony injury of the lumbar spine.      CT-TSPINE W/O PLUS RECONS   Final Result         1.  No acute traumatic bony injury of the thoracic spine.      CT-CSPINE WITHOUT PLUS RECONS   Final Result         1.  No acute traumatic bony injury of the cervical spine is apparent.          Assessment/Plan:       ACUTE PAIN/ LOWER EXTREMITY WEAKNESS/SENSORY LOSS, STRONGLY FUNCTIONAL EXAM, NOW ABLE TO MOVE LEGS BUT NOT IN LATERAL DIRECTION RIGHT HIP HOWEVER CAN LIE KNEES BENT TO LEFT WITHOUT DISCOMFORT    NEUROIMAGING SHOWS NO ETIOLOGY FOR COMPLAINTS     PMR for safe gait     No further neuro workup as inpatient if aforementioned studies WNL & maintains neuro baseline.  Neuro sign off, reconsult PRN.  F/u otpt Neuro ASAP.         Bassam Solano M.D.  , Neurohospitalist & Stroke  Clinical Professor, [employer]  Diplomate, Neurology & Neuroimaging

## 2018-06-29 NOTE — PROGRESS NOTES
Assessment completed. Moderate vaginal bleeding, pain management ongoing. Pts goal is to have good night sleep. No BM since surgery, +flatus, given stool softener. Call light within reach.

## 2018-06-30 NOTE — PROGRESS NOTES
MD visited pt,for discharge, IV D/C'd. Discharge instructions provided to pt. Pt states understanding. Pt states all questions have been answered. Copy of discharge provided to pt. Signed copy in chart. Prescriptions provided to pt, copy in chart. Pt states that all personal belongings are in possession. Pt escorted off unit without incident.Narcotic consent signed,pt left floor with her mom and sister.Hospital staff took her down in wheel chair.,took walker with her.

## 2018-06-30 NOTE — PROGRESS NOTES
Pt does not want to go home,wants to talk to MD,pt c/o RLE pain and numbness,pt able to ambulate with walker but c/o pain,explained staying in the hospital is for pain managemnt,MD prescribed pain medicine,pt moves the leg at time by self,when asked her to turn she states it is hurting,pagefabiola TREVINO and MD TALKED TO HER,PT AGREED TO GO HOME AND SEE md on Monday.

## 2018-07-02 ENCOUNTER — PATIENT OUTREACH (OUTPATIENT)
Dept: HEALTH INFORMATION MANAGEMENT | Facility: OTHER | Age: 38
End: 2018-07-02

## 2018-07-02 NOTE — PROGRESS NOTES
"Received a request to reach out to this patient from [facility] customer service.    Outreach call to the patient.  The patient reports that she is doing \"well\" since her recent discharge from the hospital and is able to take care of herself.  Florencia has a follow up appointment with neurologist on 7/9/18.  "

## 2018-08-22 ENCOUNTER — HOSPITAL ENCOUNTER (EMERGENCY)
Facility: MEDICAL CENTER | Age: 38
End: 2018-08-22
Attending: EMERGENCY MEDICINE
Payer: COMMERCIAL

## 2018-08-22 VITALS
TEMPERATURE: 97.8 F | OXYGEN SATURATION: 98 % | RESPIRATION RATE: 15 BRPM | BODY MASS INDEX: 26.64 KG/M2 | WEIGHT: 169.75 LBS | DIASTOLIC BLOOD PRESSURE: 75 MMHG | SYSTOLIC BLOOD PRESSURE: 122 MMHG | HEART RATE: 88 BPM | HEIGHT: 67 IN

## 2018-08-22 DIAGNOSIS — M79.7 FIBROMYALGIA: ICD-10-CM

## 2018-08-22 DIAGNOSIS — M79.10 MUSCULAR PAIN: ICD-10-CM

## 2018-08-22 PROCEDURE — 99283 EMERGENCY DEPT VISIT LOW MDM: CPT

## 2018-08-22 PROCEDURE — 96372 THER/PROPH/DIAG INJ SC/IM: CPT

## 2018-08-22 PROCEDURE — 700111 HCHG RX REV CODE 636 W/ 250 OVERRIDE (IP)

## 2018-08-22 RX ORDER — KETOROLAC TROMETHAMINE 30 MG/ML
60 INJECTION, SOLUTION INTRAMUSCULAR; INTRAVENOUS ONCE
Status: COMPLETED | OUTPATIENT
Start: 2018-08-22 | End: 2018-08-22

## 2018-08-22 RX ORDER — KETOROLAC TROMETHAMINE 30 MG/ML
INJECTION, SOLUTION INTRAMUSCULAR; INTRAVENOUS
Status: COMPLETED
Start: 2018-08-22 | End: 2018-08-22

## 2018-08-22 RX ADMIN — KETOROLAC TROMETHAMINE 60 MG: 30 INJECTION, SOLUTION INTRAMUSCULAR; INTRAVENOUS at 18:48

## 2018-08-22 RX ADMIN — KETOROLAC TROMETHAMINE 60 MG: 30 INJECTION, SOLUTION INTRAMUSCULAR at 18:48

## 2018-08-22 ASSESSMENT — PAIN SCALES - GENERAL: PAINLEVEL_OUTOF10: 7

## 2018-08-23 ENCOUNTER — PATIENT OUTREACH (OUTPATIENT)
Dept: HEALTH INFORMATION MANAGEMENT | Facility: OTHER | Age: 38
End: 2018-08-23

## 2018-08-23 NOTE — ED NOTES
"Patient presents with \"fibromyalgia flare up\". She states this happens a lot and the only thing that helps is a Toradol shot.   "

## 2018-08-23 NOTE — DISCHARGE INSTRUCTIONS
Myofascial Pain Syndrome  Myofascial pain syndrome is a pain disorder. This pain may be felt in the muscles. It may come and go. Myofascial pain syndrome always has trigger or tender points in the muscle that will cause pain when pressed.   CAUSES  Myofascial pain may be caused by injuries, especially auto accidents, or by overuse of certain muscles. Typically the pain is long lasting. It is made worse by overuse of the involved muscles, emotional distress, and by cold, damp weather. Myofascial pain syndrome often develops in patients whose response to stress is an increase in muscle tone, and is seen in greater frequency in patients with pre-existing tension headaches.  SYMPTOMS   Myofascial pain syndrome causes a wide variety of symptoms. You may see tight ropy bands of muscle. Problems may also include aching, cramping, burning, numbness, tingling, and other uncomfortable sensations in muscular areas. It most commonly affects the neck, upper back, and shoulder areas. Pain often radiates into the arms and hands.   TREATMENT  Treatment includes resting the affected muscular area and applying ice packs to reduce spasm and pain. Trigger point injection, is a valuable initial therapy. This therapy is an injection of local anesthetic directly into the trigger point. Trigger points are often present at the source of pain. Pain relief following injection confirms the diagnosis of myofascial pain syndrome. Fairly vigorous therapy can be carried out during the pain-free period after each injection. Stretching exercises to loosen up the muscles are also useful. Transcutaneous electrical nerve stimulation (TENS) may provide relief from pain. TENS is the use of electric current produced by a device to stimulate the nerves. Ultrasound therapy applied directly over the affected muscle may also provide pain relief. Anti-inflammatory pain medicine can be helpful. Symptoms will gradually improve over a period of weeks to months  with proper treatment.  HOME CARE INSTRUCTIONS  Call your caregiver for follow-up care as recommended.   SEEK MEDICAL CARE IF:   Your pain is severe and not helped with medications.  Document Released: 01/25/2006 Document Revised: 03/11/2013 Document Reviewed: 02/03/2012  [employer]® Patient Information ©2014 [employer], [employer].

## 2018-08-23 NOTE — ED PROVIDER NOTES
"ED Provider Note    CHIEF COMPLAINT  Back pain/neck pain/shoulder pain    HPI  Florencia Lau is a 38 y.o. female who presents for evaluation pain and spasms in her neck shoulder and back region.  Patient relates a past history of fibromyalgia.  The patient is also been seen by neurology diagnosed with a nonspecific muscular spasm disorder.  Patient states that she gets these symptoms frequently and it is relieved with a shot of Toradol.  The patient denies recent: Fever, nasal congestion, purulent rhinorrhea, cough, sputum, vomiting, diarrhea, gastrointestinal symptoms, genitourinary symptoms, headache, unilateral motor weakness or paresthesias.  No other acute symptomatology or complaints.    REVIEW OF SYSTEMS  See HPI for further details. All other systems negative.    PAST MEDICAL HISTORY  Past Medical History:   Diagnosis Date   • Anxiety    • Anxiety and depression 06/12/2018   • Back pain    • Bowel habit changes 06/12/2018    \"Constipation/Diarrhea\"   • Bronchitis     HX OF   • Depression    • Fibromyalgia    • Hayfever 06/12/2018   • Indigestion 06/12/2018   • Insomnia    • Muscle spasm 06/12/2018    \"My neurologist told me I have a muscle spasm disorder\"   • Neck pain    • Panic attacks    • Shoulder pain    • Stress incontinence 06/12/2018   • TMJ syndrome        FAMILY HISTORY  Family History   Problem Relation Age of Onset   • Diabetes Unknown    • Allergies Unknown         RA   • Other Unknown         DIVERTICULITIS, LUPUS, DDD, FM       SOCIAL HISTORY  Positive tobacco use; denies alcohol or drug;    SURGICAL HISTORY  Past Surgical History:   Procedure Laterality Date   • BLADDER SUSPENSION  6/27/2018    Procedure: BLADDER SUSPENSION-   FOR: TRANS OBTURATOR TAPE;  Surgeon: Chapin Banks M.D.;  Location: SURGERY SAME DAY [facility];  Service: Gynecology   • OTHER      neck abscess   • TONSILLECTOMY     • TUBAL LIGATION         CURRENT MEDICATIONS  See nurse's notes    ALLERGIES  Allergies " "  Allergen Reactions   • Latex Rash   • Sulfa Drugs    • Codeine Vomiting       PHYSICAL EXAM  VITAL SIGNS: /75   Pulse 88   Temp 36.6 °C (97.8 °F)   Resp 15   Ht 1.702 m (5' 7\")   Wt 77 kg (169 lb 12.1 oz)   SpO2 98%   BMI 26.59 kg/m²    Constitutional: 38-year-old female, well developed, Well nourished, sitting comfortably, appropriate and nontoxic-appearing;  HENT: Normocephalic, Atraumatic, Nares:Clear, Oropharynx: moist, well hydrated, posterior pharynx:clear   Eyes: PERRL, EOMI, Conjunctiva normal, No discharge.   Neck: Normal range of motion, No tenderness, Supple, No stridor.   Lymphatic: No lymphadenopathy noted.   Cardiovascular: Regular rate and rhythm without mumurs, gallups, rubs   Thorax & Lungs: Normal Equal breath sounds, No respiratory distress, No wheezing, no stridor, no rales. No chest tenderness.   Abdomen: Soft, nontender, nondistended, no organomegaly, positive bowel sounds normal in quality. No guarding or rebound.  Skin: Good skin turgor, pink, warm, dry. No rashes, petechiae, purpura. Normal capillary refill.   Back: No tenderness, No CVA tenderness.   Extremities: Intact distal pulses, No edema, No tenderness, No cyanosis,  Vascular: Pulses are 2+, symmetric in the upper and lower extremities.  Musculoskeletal: Good range of motion in all major joints. No tenderness to palpation or major deformities noted.  Patient does have some tenderness in the trapezius musculature extending along the paraspinous region of the upper thoracic spine area;  Neurologic: Alert & oriented x 3,  No gross focal deficits noted.   Psychiatric: Affect normal, Judgment normal, Mood normal.     COURSE & MEDICAL DECISION MAKING  Pertinent Labs & Imaging studies reviewed. (See chart for details)  1.  Toradol 60 mg IM    Discussion: At this time, patient presents for evaluation of neck pain and spasms.  Patient's undergone previous workup in the past.  I see no evidence of: Meningitis, subarachnoid " hemorrhage, cardiopulmonary disorders, acute neurological disorders, stroke syndrome, or any other conditions that mandate laboratory or imaging studies.  Treatment was initiated as noted above.  I discussed the findings and treatment plan with the patient.  She indicates she is comfortable with this expiration disposition.    FINAL IMPRESSION  1. Muscular pain    2. Fibromyalgia        PLAN  1.  Appropriate discharge instructions given  2.  Follow-up with primary care  3.  She was instructed to return if any fever change worsening symptoms or any disconcerting symptoms she is not experiencing at this time.    Electronically signed by: Guy G Gansert, 8/22/2018 6:25 PM

## 2018-09-02 ENCOUNTER — HOSPITAL ENCOUNTER (INPATIENT)
Facility: MEDICAL CENTER | Age: 38
LOS: 4 days | DRG: 494 | End: 2018-09-06
Attending: EMERGENCY MEDICINE | Admitting: HOSPITALIST
Payer: COMMERCIAL

## 2018-09-02 ENCOUNTER — APPOINTMENT (OUTPATIENT)
Dept: RADIOLOGY | Facility: MEDICAL CENTER | Age: 38
DRG: 494 | End: 2018-09-02
Attending: EMERGENCY MEDICINE
Payer: COMMERCIAL

## 2018-09-02 ENCOUNTER — APPOINTMENT (OUTPATIENT)
Dept: RADIOLOGY | Facility: MEDICAL CENTER | Age: 38
DRG: 494 | End: 2018-09-02
Attending: ORTHOPAEDIC SURGERY
Payer: COMMERCIAL

## 2018-09-02 DIAGNOSIS — S93.401A SPRAIN OF RIGHT ANKLE, UNSPECIFIED LIGAMENT, INITIAL ENCOUNTER: ICD-10-CM

## 2018-09-02 DIAGNOSIS — S82.892K CLOSED FRACTURE OF LEFT ANKLE WITH NONUNION: ICD-10-CM

## 2018-09-02 DIAGNOSIS — S82.892A CLOSED FRACTURE OF LEFT ANKLE, INITIAL ENCOUNTER: ICD-10-CM

## 2018-09-02 LAB
ANION GAP SERPL CALC-SCNC: 10 MMOL/L (ref 0–11.9)
B-HCG SERPL-ACNC: <0.6 MIU/ML (ref 0–5)
BASOPHILS # BLD AUTO: 0.6 % (ref 0–1.8)
BASOPHILS # BLD: 0.06 K/UL (ref 0–0.12)
BUN SERPL-MCNC: 15 MG/DL (ref 8–22)
CALCIUM SERPL-MCNC: 10.1 MG/DL (ref 8.5–10.5)
CHLORIDE SERPL-SCNC: 108 MMOL/L (ref 96–112)
CO2 SERPL-SCNC: 20 MMOL/L (ref 20–33)
CREAT SERPL-MCNC: 0.91 MG/DL (ref 0.5–1.4)
EOSINOPHIL # BLD AUTO: 0.18 K/UL (ref 0–0.51)
EOSINOPHIL NFR BLD: 1.9 % (ref 0–6.9)
ERYTHROCYTE [DISTWIDTH] IN BLOOD BY AUTOMATED COUNT: 41.1 FL (ref 35.9–50)
GLUCOSE SERPL-MCNC: 105 MG/DL (ref 65–99)
HCT VFR BLD AUTO: 43.3 % (ref 37–47)
HGB BLD-MCNC: 14.4 G/DL (ref 12–16)
IMM GRANULOCYTES # BLD AUTO: 0.03 K/UL (ref 0–0.11)
IMM GRANULOCYTES NFR BLD AUTO: 0.3 % (ref 0–0.9)
LYMPHOCYTES # BLD AUTO: 2.53 K/UL (ref 1–4.8)
LYMPHOCYTES NFR BLD: 27 % (ref 22–41)
MCH RBC QN AUTO: 29.8 PG (ref 27–33)
MCHC RBC AUTO-ENTMCNC: 33.3 G/DL (ref 33.6–35)
MCV RBC AUTO: 89.5 FL (ref 81.4–97.8)
MONOCYTES # BLD AUTO: 0.64 K/UL (ref 0–0.85)
MONOCYTES NFR BLD AUTO: 6.8 % (ref 0–13.4)
NEUTROPHILS # BLD AUTO: 5.92 K/UL (ref 2–7.15)
NEUTROPHILS NFR BLD: 63.4 % (ref 44–72)
NRBC # BLD AUTO: 0 K/UL
NRBC BLD-RTO: 0 /100 WBC
PLATELET # BLD AUTO: 346 K/UL (ref 164–446)
PMV BLD AUTO: 11.3 FL (ref 9–12.9)
POTASSIUM SERPL-SCNC: 4.5 MMOL/L (ref 3.6–5.5)
RBC # BLD AUTO: 4.84 M/UL (ref 4.2–5.4)
SODIUM SERPL-SCNC: 138 MMOL/L (ref 135–145)
WBC # BLD AUTO: 9.4 K/UL (ref 4.8–10.8)

## 2018-09-02 PROCEDURE — 700101 HCHG RX REV CODE 250

## 2018-09-02 PROCEDURE — 160035 HCHG PACU - 1ST 60 MINS PHASE I: Performed by: ORTHOPAEDIC SURGERY

## 2018-09-02 PROCEDURE — C1713 ANCHOR/SCREW BN/BN,TIS/BN: HCPCS | Performed by: ORTHOPAEDIC SURGERY

## 2018-09-02 PROCEDURE — A6223 GAUZE >16<=48 NO W/SAL W/O B: HCPCS | Performed by: ORTHOPAEDIC SURGERY

## 2018-09-02 PROCEDURE — 73610 X-RAY EXAM OF ANKLE: CPT | Mod: RT

## 2018-09-02 PROCEDURE — 29515 APPLICATION SHORT LEG SPLINT: CPT

## 2018-09-02 PROCEDURE — 2W3QX1Z IMMOBILIZATION OF RIGHT LOWER LEG USING SPLINT: ICD-10-PCS | Performed by: EMERGENCY MEDICINE

## 2018-09-02 PROCEDURE — 302875 HCHG BANDAGE ACE 4 OR 6""

## 2018-09-02 PROCEDURE — 0QSK04Z REPOSITION LEFT FIBULA WITH INTERNAL FIXATION DEVICE, OPEN APPROACH: ICD-10-PCS | Performed by: ORTHOPAEDIC SURGERY

## 2018-09-02 PROCEDURE — 0QSH34Z REPOSITION LEFT TIBIA WITH INTERNAL FIXATION DEVICE, PERCUTANEOUS APPROACH: ICD-10-PCS | Performed by: ORTHOPAEDIC SURGERY

## 2018-09-02 PROCEDURE — 501838 HCHG SUTURE GENERAL: Performed by: ORTHOPAEDIC SURGERY

## 2018-09-02 PROCEDURE — 160002 HCHG RECOVERY MINUTES (STAT): Performed by: ORTHOPAEDIC SURGERY

## 2018-09-02 PROCEDURE — 96375 TX/PRO/DX INJ NEW DRUG ADDON: CPT

## 2018-09-02 PROCEDURE — 160036 HCHG PACU - EA ADDL 30 MINS PHASE I: Performed by: ORTHOPAEDIC SURGERY

## 2018-09-02 PROCEDURE — 700111 HCHG RX REV CODE 636 W/ 250 OVERRIDE (IP): Performed by: EMERGENCY MEDICINE

## 2018-09-02 PROCEDURE — 700111 HCHG RX REV CODE 636 W/ 250 OVERRIDE (IP)

## 2018-09-02 PROCEDURE — 302874 HCHG BANDAGE ACE 2 OR 3""

## 2018-09-02 PROCEDURE — 99291 CRITICAL CARE FIRST HOUR: CPT

## 2018-09-02 PROCEDURE — 700102 HCHG RX REV CODE 250 W/ 637 OVERRIDE(OP): Performed by: ORTHOPAEDIC SURGERY

## 2018-09-02 PROCEDURE — 500881 HCHG PACK, EXTREMITY: Performed by: ORTHOPAEDIC SURGERY

## 2018-09-02 PROCEDURE — 85025 COMPLETE CBC W/AUTO DIFF WBC: CPT

## 2018-09-02 PROCEDURE — 2W3MX1Z IMMOBILIZATION OF LEFT LOWER EXTREMITY USING SPLINT: ICD-10-PCS | Performed by: EMERGENCY MEDICINE

## 2018-09-02 PROCEDURE — 96374 THER/PROPH/DIAG INJ IV PUSH: CPT

## 2018-09-02 PROCEDURE — A9270 NON-COVERED ITEM OR SERVICE: HCPCS | Performed by: ORTHOPAEDIC SURGERY

## 2018-09-02 PROCEDURE — 84702 CHORIONIC GONADOTROPIN TEST: CPT

## 2018-09-02 PROCEDURE — 700102 HCHG RX REV CODE 250 W/ 637 OVERRIDE(OP)

## 2018-09-02 PROCEDURE — 96376 TX/PRO/DX INJ SAME DRUG ADON: CPT

## 2018-09-02 PROCEDURE — 160048 HCHG OR STATISTICAL LEVEL 1-5: Performed by: ORTHOPAEDIC SURGERY

## 2018-09-02 PROCEDURE — 700111 HCHG RX REV CODE 636 W/ 250 OVERRIDE (IP): Performed by: ORTHOPAEDIC SURGERY

## 2018-09-02 PROCEDURE — 500054 HCHG BANDAGE, ELASTIC 6: Performed by: ORTHOPAEDIC SURGERY

## 2018-09-02 PROCEDURE — 160009 HCHG ANES TIME/MIN: Performed by: ORTHOPAEDIC SURGERY

## 2018-09-02 PROCEDURE — A9270 NON-COVERED ITEM OR SERVICE: HCPCS

## 2018-09-02 PROCEDURE — 80048 BASIC METABOLIC PNL TOTAL CA: CPT

## 2018-09-02 PROCEDURE — 160041 HCHG SURGERY MINUTES - EA ADDL 1 MIN LEVEL 4: Performed by: ORTHOPAEDIC SURGERY

## 2018-09-02 PROCEDURE — 770006 HCHG ROOM/CARE - MED/SURG/GYN SEMI*

## 2018-09-02 PROCEDURE — 99223 1ST HOSP IP/OBS HIGH 75: CPT | Performed by: HOSPITALIST

## 2018-09-02 PROCEDURE — 73610 X-RAY EXAM OF ANKLE: CPT | Mod: LT

## 2018-09-02 PROCEDURE — 160029 HCHG SURGERY MINUTES - 1ST 30 MINS LEVEL 4: Performed by: ORTHOPAEDIC SURGERY

## 2018-09-02 PROCEDURE — 73600 X-RAY EXAM OF ANKLE: CPT | Mod: LT

## 2018-09-02 DEVICE — SCREW CANN 4.0X40 SHORT OIC (3TX3+2TX2=13): Type: IMPLANTABLE DEVICE | Site: ANKLE | Status: FUNCTIONAL

## 2018-09-02 DEVICE — WIRE 1.25MMX150MM K-WIRE (10 PACK) (6TX10=60): Type: IMPLANTABLE DEVICE | Site: ANKLE | Status: FUNCTIONAL

## 2018-09-02 DEVICE — SCREW 2.5 MM LOCKING TI X 14MM LONG (6TX8=48): Type: IMPLANTABLE DEVICE | Site: ANKLE | Status: FUNCTIONAL

## 2018-09-02 DEVICE — SCREW 2.5 MM LOCKING TI X 12MM LONG (6TX8=48): Type: IMPLANTABLE DEVICE | Site: ANKLE | Status: FUNCTIONAL

## 2018-09-02 DEVICE — SCREW 3.5 MM NON-LOCKING TI X 12MM LONG (6TX8+2TX5=58): Type: IMPLANTABLE DEVICE | Site: ANKLE | Status: FUNCTIONAL

## 2018-09-02 DEVICE — PLATE DISTAL FIBULA 4H (2TX2+2TX1=6): Type: IMPLANTABLE DEVICE | Site: ANKLE | Status: FUNCTIONAL

## 2018-09-02 DEVICE — SCREW 2.5 MM NON-LOCKING TI X 16MM LONG (6TX8=48): Type: IMPLANTABLE DEVICE | Site: ANKLE | Status: FUNCTIONAL

## 2018-09-02 DEVICE — SCREW 3.5 MM NON-LOCKING TI X 10MM LONG (6TX8+2TX5=58): Type: IMPLANTABLE DEVICE | Site: ANKLE | Status: FUNCTIONAL

## 2018-09-02 RX ORDER — HYDROMORPHONE HYDROCHLORIDE 2 MG/ML
0.5 INJECTION, SOLUTION INTRAMUSCULAR; INTRAVENOUS; SUBCUTANEOUS
Status: DISCONTINUED | OUTPATIENT
Start: 2018-09-02 | End: 2018-09-05

## 2018-09-02 RX ORDER — DOCUSATE SODIUM 100 MG/1
100 CAPSULE, LIQUID FILLED ORAL 2 TIMES DAILY
Status: DISCONTINUED | OUTPATIENT
Start: 2018-09-02 | End: 2018-09-06 | Stop reason: HOSPADM

## 2018-09-02 RX ORDER — TRAZODONE HYDROCHLORIDE 50 MG/1
100 TABLET ORAL
Status: DISCONTINUED | OUTPATIENT
Start: 2018-09-02 | End: 2018-09-03

## 2018-09-02 RX ORDER — DEXAMETHASONE SODIUM PHOSPHATE 4 MG/ML
4 INJECTION, SOLUTION INTRA-ARTICULAR; INTRALESIONAL; INTRAMUSCULAR; INTRAVENOUS; SOFT TISSUE
Status: COMPLETED | OUTPATIENT
Start: 2018-09-02 | End: 2018-09-03

## 2018-09-02 RX ORDER — CEFAZOLIN SODIUM 2 G/100ML
2 INJECTION, SOLUTION INTRAVENOUS EVERY 8 HOURS
Status: COMPLETED | OUTPATIENT
Start: 2018-09-03 | End: 2018-09-03

## 2018-09-02 RX ORDER — LAMOTRIGINE 100 MG/1
100 TABLET ORAL DAILY
Status: DISCONTINUED | OUTPATIENT
Start: 2018-09-03 | End: 2018-09-06 | Stop reason: HOSPADM

## 2018-09-02 RX ORDER — KETOROLAC TROMETHAMINE 30 MG/ML
INJECTION, SOLUTION INTRAMUSCULAR; INTRAVENOUS
Status: COMPLETED
Start: 2018-09-02 | End: 2018-09-02

## 2018-09-02 RX ORDER — ENEMA 19; 7 G/133ML; G/133ML
1 ENEMA RECTAL
Status: DISCONTINUED | OUTPATIENT
Start: 2018-09-02 | End: 2018-09-06 | Stop reason: HOSPADM

## 2018-09-02 RX ORDER — PROMETHAZINE HYDROCHLORIDE 12.5 MG/1
12.5-25 SUPPOSITORY RECTAL EVERY 4 HOURS PRN
Status: DISCONTINUED | OUTPATIENT
Start: 2018-09-02 | End: 2018-09-06 | Stop reason: HOSPADM

## 2018-09-02 RX ORDER — SCOLOPAMINE TRANSDERMAL SYSTEM 1 MG/1
1 PATCH, EXTENDED RELEASE TRANSDERMAL
Status: DISCONTINUED | OUTPATIENT
Start: 2018-09-02 | End: 2018-09-06 | Stop reason: HOSPADM

## 2018-09-02 RX ORDER — ALPRAZOLAM 0.25 MG/1
0.25 TABLET ORAL ONCE
Status: COMPLETED | OUTPATIENT
Start: 2018-09-02 | End: 2018-09-02

## 2018-09-02 RX ORDER — ONDANSETRON 2 MG/ML
4 INJECTION INTRAMUSCULAR; INTRAVENOUS EVERY 4 HOURS PRN
Status: DISCONTINUED | OUTPATIENT
Start: 2018-09-02 | End: 2018-09-06 | Stop reason: HOSPADM

## 2018-09-02 RX ORDER — OXYCODONE HCL 5 MG/5 ML
SOLUTION, ORAL ORAL
Status: COMPLETED
Start: 2018-09-02 | End: 2018-09-02

## 2018-09-02 RX ORDER — MORPHINE SULFATE 4 MG/ML
4 INJECTION, SOLUTION INTRAMUSCULAR; INTRAVENOUS ONCE
Status: COMPLETED | OUTPATIENT
Start: 2018-09-02 | End: 2018-09-02

## 2018-09-02 RX ORDER — DIPHENHYDRAMINE HYDROCHLORIDE 50 MG/ML
25 INJECTION INTRAMUSCULAR; INTRAVENOUS EVERY 6 HOURS PRN
Status: DISCONTINUED | OUTPATIENT
Start: 2018-09-02 | End: 2018-09-06 | Stop reason: HOSPADM

## 2018-09-02 RX ORDER — MIDAZOLAM HYDROCHLORIDE 1 MG/ML
INJECTION INTRAMUSCULAR; INTRAVENOUS
Status: DISPENSED
Start: 2018-09-02 | End: 2018-09-03

## 2018-09-02 RX ORDER — ONDANSETRON 2 MG/ML
4 INJECTION INTRAMUSCULAR; INTRAVENOUS ONCE
Status: DISCONTINUED | OUTPATIENT
Start: 2018-09-02 | End: 2018-09-02

## 2018-09-02 RX ORDER — PROMETHAZINE HYDROCHLORIDE 25 MG/1
12.5-25 TABLET ORAL EVERY 4 HOURS PRN
Status: DISCONTINUED | OUTPATIENT
Start: 2018-09-02 | End: 2018-09-06 | Stop reason: HOSPADM

## 2018-09-02 RX ORDER — ACETAMINOPHEN 325 MG/1
650 TABLET ORAL EVERY 6 HOURS
Status: DISCONTINUED | OUTPATIENT
Start: 2018-09-03 | End: 2018-09-06 | Stop reason: HOSPADM

## 2018-09-02 RX ORDER — POLYETHYLENE GLYCOL 3350 17 G/17G
1 POWDER, FOR SOLUTION ORAL
Status: DISCONTINUED | OUTPATIENT
Start: 2018-09-02 | End: 2018-09-02

## 2018-09-02 RX ORDER — TRAZODONE HYDROCHLORIDE 150 MG/1
75-150 TABLET ORAL NIGHTLY
COMMUNITY
End: 2021-07-05

## 2018-09-02 RX ORDER — AMOXICILLIN 250 MG
1 CAPSULE ORAL
Status: DISCONTINUED | OUTPATIENT
Start: 2018-09-02 | End: 2018-09-06 | Stop reason: HOSPADM

## 2018-09-02 RX ORDER — ACETAMINOPHEN 325 MG/1
650 TABLET ORAL EVERY 4 HOURS PRN
COMMUNITY
End: 2018-11-29

## 2018-09-02 RX ORDER — POLYETHYLENE GLYCOL 3350 17 G/17G
1 POWDER, FOR SOLUTION ORAL 2 TIMES DAILY PRN
Status: DISCONTINUED | OUTPATIENT
Start: 2018-09-02 | End: 2018-09-06 | Stop reason: HOSPADM

## 2018-09-02 RX ORDER — HYDROCODONE BITARTRATE AND ACETAMINOPHEN 5; 325 MG/1; MG/1
1 TABLET ORAL EVERY 4 HOURS PRN
Qty: 20 TAB | Refills: 0 | Status: SHIPPED | OUTPATIENT
Start: 2018-09-02 | End: 2018-09-05

## 2018-09-02 RX ORDER — ONDANSETRON 4 MG/1
4 TABLET, ORALLY DISINTEGRATING ORAL EVERY 4 HOURS PRN
Status: DISCONTINUED | OUTPATIENT
Start: 2018-09-02 | End: 2018-09-06 | Stop reason: HOSPADM

## 2018-09-02 RX ORDER — ACETAMINOPHEN 325 MG/1
650 TABLET ORAL EVERY 6 HOURS PRN
Status: DISCONTINUED | OUTPATIENT
Start: 2018-09-02 | End: 2018-09-06 | Stop reason: HOSPADM

## 2018-09-02 RX ORDER — SODIUM CHLORIDE 9 MG/ML
INJECTION, SOLUTION INTRAVENOUS CONTINUOUS
Status: DISCONTINUED | OUTPATIENT
Start: 2018-09-02 | End: 2018-09-03

## 2018-09-02 RX ORDER — MORPHINE SULFATE 10 MG/ML
8 INJECTION, SOLUTION INTRAMUSCULAR; INTRAVENOUS ONCE
Status: COMPLETED | OUTPATIENT
Start: 2018-09-02 | End: 2018-09-02

## 2018-09-02 RX ORDER — BISACODYL 10 MG
10 SUPPOSITORY, RECTAL RECTAL
Status: DISCONTINUED | OUTPATIENT
Start: 2018-09-02 | End: 2018-09-02

## 2018-09-02 RX ORDER — ONDANSETRON 2 MG/ML
4 INJECTION INTRAMUSCULAR; INTRAVENOUS ONCE
Status: COMPLETED | OUTPATIENT
Start: 2018-09-02 | End: 2018-09-02

## 2018-09-02 RX ORDER — MIDAZOLAM HYDROCHLORIDE 1 MG/ML
INJECTION INTRAMUSCULAR; INTRAVENOUS
Status: COMPLETED
Start: 2018-09-02 | End: 2018-09-02

## 2018-09-02 RX ORDER — GABAPENTIN 300 MG/1
300 CAPSULE ORAL 3 TIMES DAILY
Status: DISCONTINUED | OUTPATIENT
Start: 2018-09-03 | End: 2018-09-03

## 2018-09-02 RX ORDER — HALOPERIDOL 5 MG/ML
1 INJECTION INTRAMUSCULAR EVERY 6 HOURS PRN
Status: DISCONTINUED | OUTPATIENT
Start: 2018-09-02 | End: 2018-09-06 | Stop reason: HOSPADM

## 2018-09-02 RX ORDER — AMOXICILLIN 250 MG
2 CAPSULE ORAL 2 TIMES DAILY
Status: DISCONTINUED | OUTPATIENT
Start: 2018-09-02 | End: 2018-09-02

## 2018-09-02 RX ORDER — AMOXICILLIN 250 MG
1 CAPSULE ORAL NIGHTLY
Status: DISCONTINUED | OUTPATIENT
Start: 2018-09-02 | End: 2018-09-06 | Stop reason: HOSPADM

## 2018-09-02 RX ORDER — HYDROMORPHONE HYDROCHLORIDE 2 MG/ML
INJECTION, SOLUTION INTRAMUSCULAR; INTRAVENOUS; SUBCUTANEOUS
Status: COMPLETED
Start: 2018-09-02 | End: 2018-09-02

## 2018-09-02 RX ORDER — VENLAFAXINE HYDROCHLORIDE 75 MG/1
150 CAPSULE, EXTENDED RELEASE ORAL DAILY
Status: DISCONTINUED | OUTPATIENT
Start: 2018-09-03 | End: 2018-09-06 | Stop reason: HOSPADM

## 2018-09-02 RX ORDER — BISACODYL 10 MG
10 SUPPOSITORY, RECTAL RECTAL
Status: DISCONTINUED | OUTPATIENT
Start: 2018-09-02 | End: 2018-09-06 | Stop reason: HOSPADM

## 2018-09-02 RX ORDER — OXYCODONE HYDROCHLORIDE 10 MG/1
10 TABLET ORAL
Status: DISCONTINUED | OUTPATIENT
Start: 2018-09-02 | End: 2018-09-05

## 2018-09-02 RX ORDER — ALPRAZOLAM 0.25 MG/1
TABLET ORAL
Status: DISPENSED
Start: 2018-09-02 | End: 2018-09-03

## 2018-09-02 RX ORDER — VENLAFAXINE HYDROCHLORIDE 150 MG/1
150 CAPSULE, EXTENDED RELEASE ORAL DAILY
COMMUNITY
End: 2022-02-03

## 2018-09-02 RX ORDER — OXYCODONE HYDROCHLORIDE 5 MG/1
5 TABLET ORAL
Status: DISCONTINUED | OUTPATIENT
Start: 2018-09-02 | End: 2018-09-06 | Stop reason: HOSPADM

## 2018-09-02 RX ORDER — KETOROLAC TROMETHAMINE 30 MG/ML
30 INJECTION, SOLUTION INTRAMUSCULAR; INTRAVENOUS EVERY 6 HOURS
Status: DISCONTINUED | OUTPATIENT
Start: 2018-09-03 | End: 2018-09-03

## 2018-09-02 RX ORDER — IBUPROFEN 200 MG
600-800 TABLET ORAL EVERY 6 HOURS PRN
Status: ON HOLD | COMMUNITY
End: 2018-09-05

## 2018-09-02 RX ADMIN — ONDANSETRON 4 MG: 2 INJECTION INTRAMUSCULAR; INTRAVENOUS at 16:18

## 2018-09-02 RX ADMIN — MORPHINE SULFATE 4 MG: 4 INJECTION INTRAVENOUS at 17:53

## 2018-09-02 RX ADMIN — KETOROLAC TROMETHAMINE 30 MG: 30 INJECTION, SOLUTION INTRAMUSCULAR; INTRAVENOUS at 23:41

## 2018-09-02 RX ADMIN — FENTANYL CITRATE 50 MCG: 50 INJECTION, SOLUTION INTRAMUSCULAR; INTRAVENOUS at 21:55

## 2018-09-02 RX ADMIN — ALPRAZOLAM 0.25 MG: 0.25 TABLET ORAL at 22:37

## 2018-09-02 RX ADMIN — MIDAZOLAM 1 MG: 1 INJECTION INTRAMUSCULAR; INTRAVENOUS at 21:55

## 2018-09-02 RX ADMIN — HYDROMORPHONE HYDROCHLORIDE 0.5 MG: 2 INJECTION INTRAMUSCULAR; INTRAVENOUS; SUBCUTANEOUS at 22:35

## 2018-09-02 RX ADMIN — MIDAZOLAM 1 MG: 1 INJECTION INTRAMUSCULAR; INTRAVENOUS at 22:38

## 2018-09-02 RX ADMIN — HYDROMORPHONE HYDROCHLORIDE 0.5 MG: 2 INJECTION INTRAMUSCULAR; INTRAVENOUS; SUBCUTANEOUS at 22:22

## 2018-09-02 RX ADMIN — HYDROMORPHONE HYDROCHLORIDE 0.5 MG: 2 INJECTION INTRAMUSCULAR; INTRAVENOUS; SUBCUTANEOUS at 22:16

## 2018-09-02 RX ADMIN — FENTANYL CITRATE 50 MCG: 50 INJECTION, SOLUTION INTRAMUSCULAR; INTRAVENOUS at 19:30

## 2018-09-02 RX ADMIN — OXYCODONE HYDROCHLORIDE 10 MG: 10 TABLET ORAL at 23:42

## 2018-09-02 RX ADMIN — OXYCODONE HYDROCHLORIDE 10 MG: 5 SOLUTION ORAL at 21:55

## 2018-09-02 RX ADMIN — FENTANYL CITRATE 50 MCG: 50 INJECTION, SOLUTION INTRAMUSCULAR; INTRAVENOUS at 22:00

## 2018-09-02 RX ADMIN — HYDROMORPHONE HYDROCHLORIDE 0.5 MG: 2 INJECTION INTRAMUSCULAR; INTRAVENOUS; SUBCUTANEOUS at 22:42

## 2018-09-02 RX ADMIN — DIPHENHYDRAMINE HYDROCHLORIDE 25 MG: 50 INJECTION, SOLUTION INTRAMUSCULAR; INTRAVENOUS at 23:47

## 2018-09-02 RX ADMIN — KETOROLAC TROMETHAMINE 30 MG: 30 INJECTION, SOLUTION INTRAMUSCULAR at 21:52

## 2018-09-02 RX ADMIN — MORPHINE SULFATE 8 MG: 10 INJECTION INTRAVENOUS at 16:18

## 2018-09-02 RX ADMIN — ACETAMINOPHEN 650 MG: 325 TABLET, FILM COATED ORAL at 23:42

## 2018-09-02 ASSESSMENT — COPD QUESTIONNAIRES
COPD SCREENING SCORE: 5
DURING THE PAST 4 WEEKS HOW MUCH DID YOU FEEL SHORT OF BREATH: SOME OF THE TIME
DO YOU EVER COUGH UP ANY MUCUS OR PHLEGM?: YES, A FEW DAYS A WEEK OR MONTH
HAVE YOU SMOKED AT LEAST 100 CIGARETTES IN YOUR ENTIRE LIFE: YES

## 2018-09-02 ASSESSMENT — ENCOUNTER SYMPTOMS
NEUROLOGICAL NEGATIVE: 1
RESPIRATORY NEGATIVE: 1
CARDIOVASCULAR NEGATIVE: 1
GASTROINTESTINAL NEGATIVE: 1
PSYCHIATRIC NEGATIVE: 1
MYALGIAS: 1

## 2018-09-02 ASSESSMENT — PAIN SCALES - GENERAL
PAINLEVEL_OUTOF10: 9
PAINLEVEL_OUTOF10: 9
PAINLEVEL_OUTOF10: 6
PAINLEVEL_OUTOF10: 9
PAINLEVEL_OUTOF10: 7
PAINLEVEL_OUTOF10: 0
PAINLEVEL_OUTOF10: 9
PAINLEVEL_OUTOF10: 9

## 2018-09-02 ASSESSMENT — LIFESTYLE VARIABLES: EVER_SMOKED: YES

## 2018-09-02 NOTE — DISCHARGE PLANNING
Received Choice form at [phone]  Agency/Facility Name: [facility]  Referral sent per Choice form @ [phone]

## 2018-09-02 NOTE — DISCHARGE PLANNING
Call received from MD requesting wheelchair for patient.  Choice form provided and signed.  Faxed to [facility].

## 2018-09-02 NOTE — FACE TO FACE
Face to Face Note  -  Durable Medical Equipment    Grace Mendoza M.D. - NPI: [id number]  I certify that this patient is under my care and that they had a durable medical equipment(DME)face to face encounter by myself that meets the physician DME face-to-face encounter requirements with this patient on:    Date of encounter:   Patient:                    MRN:                       YOB: 2017  Florencia Lau  [id number]  1980     The encounter with the patient was in whole, or in part, for the following medical condition, which is the primary reason for durable medical equipment:  Other - right ankle sprain, left ankle fracture    I certify that, based on my findings, the following durable medical equipment is medically necessary:  Wheel Chair.    HOME O2 Saturation Measurements:(Values must be present for Home Oxygen orders)         ,     ,         My Clinical findings support the need for the above equipment due to:  Other - right ankle sprain, left ankle fracture    Supporting Symptoms: right ankle sprain, left ankle fracture

## 2018-09-02 NOTE — ED TRIAGE NOTES
Pt to triage .  Chief Complaint   Patient presents with   • Ankle Injury     bilateral ankle pain/swelling. pt states she rolled her ankles when coming down off a narrow step

## 2018-09-02 NOTE — ED PROVIDER NOTES
"ED Provider Note    CHIEF COMPLAINT  Chief Complaint   Patient presents with   • Ankle Injury     bilateral ankle pain/swelling. pt states she rolled her ankles when coming down off a narrow step        HPI  Florencia Lau is a 38 y.o. female who presents complaining of bilateral ankle pain and swelling after falling into a pothole at the [employer] just prior to arrival.  She states that she rolled both of her ankles when she fell into the hole.  She denies any head injury or loss of consciousness.  Shedenies any knee or hip pain.  She has no previous history of ankle fracture or injury.  She denies any numbness or tingling distal to her ankles.  She states she does have some popping and clicking and instability of her left ankle but has not been able to bear weight on either ankle since the accident.      REVIEW OF SYSTEMS  No history of poor wound healing diabetes or bony abnormalities     PAST MEDICAL HISTORY  Past Medical History:   Diagnosis Date   • Anxiety    • Anxiety and depression 06/12/2018   • Back pain    • Bowel habit changes 06/12/2018    \"Constipation/Diarrhea\"   • Bronchitis     HX OF   • Depression    • Fibromyalgia    • Hayfever 06/12/2018   • Indigestion 06/12/2018   • Insomnia    • Muscle spasm 06/12/2018    \"My neurologist told me I have a muscle spasm disorder\"   • Neck pain    • Panic attacks    • Shoulder pain    • Stress incontinence 06/12/2018   • TMJ syndrome        SOCIAL HISTORY  Social History     Social History   • Marital status: Single     Spouse name: N/A   • Number of children: N/A   • Years of education: N/A     Social History Main Topics   • Smoking status: Current Every Day Smoker     Packs/day: 0.50     Types: Cigarettes   • Smokeless tobacco: Never Used      Comment: 1 PK per day   • Alcohol use No   • Drug use: No   • Sexual activity: Not on file     Other Topics Concern   • Not on file     Social History Narrative   • No narrative on file       CURRENT " MEDICATIONS  Home Medications     Reviewed by Va Mccoy R.N. (Registered Nurse) on 09/02/18 at 1530  Med List Status: Partial   Medication Last Dose Status   albuterol 108 (90 BASE) MCG/ACT Aero Soln inhalation aerosol prn Active   gabapentin (NEURONTIN) 300 MG Cap 9/2/2018 Active   ibuprofen (MOTRIN) 800 MG Tab prn Active   lamoTRIgine (LAMICTAL) 100 MG Tab 9/2/2018 Active   naproxen (NAPROSYN) 500 MG Tab prn Active   Polyethylene Glycol 3350 (MIRALAX PO) prn Active   tizanidine (ZANAFLEX) 4 MG Tab 9/2/2018 Active   trazodone (DESYREL) 150 MG Tab 9/1/2018 Active   venlafaxine (EFFEXOR) 75 MG Tab 9/2/2018 Active                ALLERGIES  Allergies   Allergen Reactions   • Latex Rash   • Sulfa Drugs    • Codeine Vomiting       PHYSICAL EXAM  VITAL SIGNS: /74   Pulse (!) 107   Temp 36.6 °C (97.8 °F) (Temporal)   Resp 16   Wt 81.2 kg (179 lb)   LMP 08/03/2018 (Approximate)   SpO2 97%   BMI 28.04 kg/m²    Constitutional: No distress  Skin: Positive anterior ankle abrasions without any active bleeding.  Normal capillary refill distal  Musculoskeletal: Positive bilateral lateral malleolus swelling to both ankles left worse than right.  Range of motion is decreased secondary to pain.  Distally he is neurovascular intact with normal sensation normal dorsal pedal posterior tibial pulses and normal cap refill.  Vascular: warm to touch good capillary refill   Neurologic: distally neurovascularly intact  Psychiatric: Affect normal    Radiology  DX-ANKLE 3+ VIEWS LEFT   Final Result      Bimalleolar ankle fracture.      DX-ANKLE 3+ VIEWS RIGHT   Final Result      Soft tissue swelling without evidence of fracture.            Medical Decision Making  Differential diagnosis: Fracture versus sprain    The patient was given IM morphine and Zofran for pain her legs were elevated and ice was applied.      4:30 PM I filled out a DMV sheet for a wheelchair.  The patient was put in the left ankle splint and a right ankle  Aircast splint. She is to ice and elevate her ankles and avoid weightbearing.  Her apartment is too small to accommodate a wheelchair and she has trouble with walking with a history of having weak ankles in the first place.  The patient will be admitted to the hospitalist service for pain control and consult with a rehab facility.  I spoke with Dr. Jaramillo who will operate on her left ankle tonight.  Splint was applied to her ankles bilaterally.  She understands she will be admitted at this time.  Preop labs were ordered and she was given more pain medication.      Disposition is admitted      Diagnosis  1. Closed fracture of left ankle, initial encounter    2. Sprain of right ankle, unspecified ligament, initial encounter

## 2018-09-03 PROCEDURE — 97166 OT EVAL MOD COMPLEX 45 MIN: CPT

## 2018-09-03 PROCEDURE — 700102 HCHG RX REV CODE 250 W/ 637 OVERRIDE(OP): Performed by: HOSPITALIST

## 2018-09-03 PROCEDURE — 700112 HCHG RX REV CODE 229: Performed by: ORTHOPAEDIC SURGERY

## 2018-09-03 PROCEDURE — 700111 HCHG RX REV CODE 636 W/ 250 OVERRIDE (IP): Performed by: ORTHOPAEDIC SURGERY

## 2018-09-03 PROCEDURE — 51798 US URINE CAPACITY MEASURE: CPT

## 2018-09-03 PROCEDURE — 700102 HCHG RX REV CODE 250 W/ 637 OVERRIDE(OP): Performed by: FAMILY MEDICINE

## 2018-09-03 PROCEDURE — 700105 HCHG RX REV CODE 258: Performed by: HOSPITALIST

## 2018-09-03 PROCEDURE — A9270 NON-COVERED ITEM OR SERVICE: HCPCS | Performed by: HOSPITALIST

## 2018-09-03 PROCEDURE — 700111 HCHG RX REV CODE 636 W/ 250 OVERRIDE (IP): Performed by: FAMILY MEDICINE

## 2018-09-03 PROCEDURE — A9270 NON-COVERED ITEM OR SERVICE: HCPCS | Performed by: ORTHOPAEDIC SURGERY

## 2018-09-03 PROCEDURE — 700111 HCHG RX REV CODE 636 W/ 250 OVERRIDE (IP): Performed by: HOSPITALIST

## 2018-09-03 PROCEDURE — G8987 SELF CARE CURRENT STATUS: HCPCS | Mod: CJ

## 2018-09-03 PROCEDURE — 700102 HCHG RX REV CODE 250 W/ 637 OVERRIDE(OP): Performed by: ORTHOPAEDIC SURGERY

## 2018-09-03 PROCEDURE — 770006 HCHG ROOM/CARE - MED/SURG/GYN SEMI*

## 2018-09-03 PROCEDURE — A9270 NON-COVERED ITEM OR SERVICE: HCPCS | Performed by: FAMILY MEDICINE

## 2018-09-03 PROCEDURE — G8988 SELF CARE GOAL STATUS: HCPCS | Mod: CI

## 2018-09-03 PROCEDURE — 99232 SBSQ HOSP IP/OBS MODERATE 35: CPT | Performed by: FAMILY MEDICINE

## 2018-09-03 RX ORDER — GABAPENTIN 300 MG/1
300 CAPSULE ORAL 4 TIMES DAILY
Status: DISCONTINUED | OUTPATIENT
Start: 2018-09-03 | End: 2018-09-03

## 2018-09-03 RX ORDER — TRAZODONE HYDROCHLORIDE 150 MG/1
150 TABLET ORAL
Status: DISCONTINUED | OUTPATIENT
Start: 2018-09-03 | End: 2018-09-06 | Stop reason: HOSPADM

## 2018-09-03 RX ORDER — TIZANIDINE 4 MG/1
4 TABLET ORAL
COMMUNITY
End: 2019-03-16 | Stop reason: CLARIF

## 2018-09-03 RX ORDER — TRAZODONE HYDROCHLORIDE 150 MG/1
150 TABLET ORAL
Status: DISCONTINUED | OUTPATIENT
Start: 2018-09-03 | End: 2018-09-03

## 2018-09-03 RX ORDER — GABAPENTIN 300 MG/1
600 CAPSULE ORAL 4 TIMES DAILY
Status: DISCONTINUED | OUTPATIENT
Start: 2018-09-03 | End: 2018-09-06 | Stop reason: HOSPADM

## 2018-09-03 RX ORDER — TIZANIDINE 4 MG/1
4 TABLET ORAL EVERY 8 HOURS PRN
Status: DISCONTINUED | OUTPATIENT
Start: 2018-09-03 | End: 2018-09-06 | Stop reason: HOSPADM

## 2018-09-03 RX ORDER — NICOTINE 21 MG/24HR
14 PATCH, TRANSDERMAL 24 HOURS TRANSDERMAL
Status: DISCONTINUED | OUTPATIENT
Start: 2018-09-03 | End: 2018-09-06 | Stop reason: HOSPADM

## 2018-09-03 RX ORDER — BENZOCAINE/MENTHOL 6 MG-10 MG
LOZENGE MUCOUS MEMBRANE 2 TIMES DAILY
Status: DISCONTINUED | OUTPATIENT
Start: 2018-09-03 | End: 2018-09-06 | Stop reason: HOSPADM

## 2018-09-03 RX ORDER — KETOROLAC TROMETHAMINE 30 MG/ML
30 INJECTION, SOLUTION INTRAMUSCULAR; INTRAVENOUS EVERY 4 HOURS PRN
Status: DISCONTINUED | OUTPATIENT
Start: 2018-09-03 | End: 2018-09-05

## 2018-09-03 RX ADMIN — ACETAMINOPHEN 650 MG: 325 TABLET, FILM COATED ORAL at 10:56

## 2018-09-03 RX ADMIN — GABAPENTIN 600 MG: 300 CAPSULE ORAL at 08:44

## 2018-09-03 RX ADMIN — VENLAFAXINE HYDROCHLORIDE 150 MG: 75 CAPSULE, EXTENDED RELEASE ORAL at 06:03

## 2018-09-03 RX ADMIN — OXYCODONE HYDROCHLORIDE 5 MG: 5 TABLET ORAL at 23:30

## 2018-09-03 RX ADMIN — ACETAMINOPHEN 650 MG: 325 TABLET, FILM COATED ORAL at 23:30

## 2018-09-03 RX ADMIN — GABAPENTIN 600 MG: 300 CAPSULE ORAL at 17:09

## 2018-09-03 RX ADMIN — KETOROLAC TROMETHAMINE 30 MG: 30 INJECTION, SOLUTION INTRAMUSCULAR; INTRAVENOUS at 18:39

## 2018-09-03 RX ADMIN — KETOROLAC TROMETHAMINE 30 MG: 30 INJECTION, SOLUTION INTRAMUSCULAR; INTRAVENOUS at 22:34

## 2018-09-03 RX ADMIN — SODIUM CHLORIDE: 9 INJECTION, SOLUTION INTRAVENOUS at 06:01

## 2018-09-03 RX ADMIN — HYDROMORPHONE HYDROCHLORIDE 0.5 MG: 2 INJECTION INTRAMUSCULAR; INTRAVENOUS; SUBCUTANEOUS at 21:36

## 2018-09-03 RX ADMIN — ACETAMINOPHEN 650 MG: 325 TABLET, FILM COATED ORAL at 06:03

## 2018-09-03 RX ADMIN — HYDROMORPHONE HYDROCHLORIDE 0.5 MG: 2 INJECTION INTRAMUSCULAR; INTRAVENOUS; SUBCUTANEOUS at 10:55

## 2018-09-03 RX ADMIN — HYDROMORPHONE HYDROCHLORIDE 0.5 MG: 2 INJECTION INTRAMUSCULAR; INTRAVENOUS; SUBCUTANEOUS at 14:45

## 2018-09-03 RX ADMIN — OXYCODONE HYDROCHLORIDE 10 MG: 10 TABLET ORAL at 20:15

## 2018-09-03 RX ADMIN — HYDROCORTISONE: 10 CREAM TOPICAL at 18:00

## 2018-09-03 RX ADMIN — OXYCODONE HYDROCHLORIDE 10 MG: 10 TABLET ORAL at 13:06

## 2018-09-03 RX ADMIN — GABAPENTIN 600 MG: 300 CAPSULE ORAL at 13:06

## 2018-09-03 RX ADMIN — ACETAMINOPHEN 650 MG: 325 TABLET, FILM COATED ORAL at 17:09

## 2018-09-03 RX ADMIN — OXYCODONE HYDROCHLORIDE 10 MG: 10 TABLET ORAL at 17:09

## 2018-09-03 RX ADMIN — CEFAZOLIN SODIUM 2 G: 2 INJECTION, SOLUTION INTRAVENOUS at 06:02

## 2018-09-03 RX ADMIN — KETOROLAC TROMETHAMINE 30 MG: 30 INJECTION, SOLUTION INTRAMUSCULAR; INTRAVENOUS at 10:55

## 2018-09-03 RX ADMIN — CEFAZOLIN SODIUM 2 G: 2 INJECTION, SOLUTION INTRAVENOUS at 13:06

## 2018-09-03 RX ADMIN — HYDROMORPHONE HYDROCHLORIDE 0.5 MG: 2 INJECTION INTRAMUSCULAR; INTRAVENOUS; SUBCUTANEOUS at 06:36

## 2018-09-03 RX ADMIN — TIZANIDINE 4 MG: 4 TABLET ORAL at 22:34

## 2018-09-03 RX ADMIN — KETOROLAC TROMETHAMINE 30 MG: 30 INJECTION, SOLUTION INTRAMUSCULAR; INTRAVENOUS at 15:11

## 2018-09-03 RX ADMIN — OXYCODONE HYDROCHLORIDE 10 MG: 10 TABLET ORAL at 02:57

## 2018-09-03 RX ADMIN — OXYCODONE HYDROCHLORIDE 10 MG: 10 TABLET ORAL at 08:44

## 2018-09-03 RX ADMIN — TRAZODONE HYDROCHLORIDE 150 MG: 150 TABLET ORAL at 22:34

## 2018-09-03 RX ADMIN — OXYCODONE HYDROCHLORIDE 10 MG: 10 TABLET ORAL at 06:02

## 2018-09-03 RX ADMIN — HYDROMORPHONE HYDROCHLORIDE 0.5 MG: 2 INJECTION INTRAMUSCULAR; INTRAVENOUS; SUBCUTANEOUS at 14:23

## 2018-09-03 RX ADMIN — KETOROLAC TROMETHAMINE 30 MG: 30 INJECTION, SOLUTION INTRAMUSCULAR; INTRAVENOUS at 04:59

## 2018-09-03 RX ADMIN — HYDROMORPHONE HYDROCHLORIDE 0.5 MG: 2 INJECTION INTRAMUSCULAR; INTRAVENOUS; SUBCUTANEOUS at 18:42

## 2018-09-03 RX ADMIN — DOCUSATE SODIUM 100 MG: 100 CAPSULE, LIQUID FILLED ORAL at 17:09

## 2018-09-03 RX ADMIN — TRAZODONE HYDROCHLORIDE 150 MG: 150 TABLET ORAL at 00:51

## 2018-09-03 RX ADMIN — GABAPENTIN 600 MG: 300 CAPSULE ORAL at 20:15

## 2018-09-03 RX ADMIN — DIPHENHYDRAMINE HYDROCHLORIDE 25 MG: 50 INJECTION, SOLUTION INTRAMUSCULAR; INTRAVENOUS at 20:35

## 2018-09-03 RX ADMIN — LAMOTRIGINE 100 MG: 100 TABLET ORAL at 06:02

## 2018-09-03 RX ADMIN — NICOTINE 14 MG: 14 PATCH, EXTENDED RELEASE TRANSDERMAL at 09:00

## 2018-09-03 ASSESSMENT — COGNITIVE AND FUNCTIONAL STATUS - GENERAL
SUGGESTED CMS G CODE MODIFIER MOBILITY: CK
CLIMB 3 TO 5 STEPS WITH RAILING: A LOT
WALKING IN HOSPITAL ROOM: A LOT
DAILY ACTIVITIY SCORE: 21
SUGGESTED CMS G CODE MODIFIER DAILY ACTIVITY: CJ
TOILETING: A LITTLE
HELP NEEDED FOR BATHING: A LITTLE
STANDING UP FROM CHAIR USING ARMS: A LITTLE
MOBILITY SCORE: 17
DRESSING REGULAR LOWER BODY CLOTHING: A LITTLE
SUGGESTED CMS G CODE MODIFIER DAILY ACTIVITY: CJ
TURNING FROM BACK TO SIDE WHILE IN FLAT BAD: A LITTLE
DAILY ACTIVITIY SCORE: 21
HELP NEEDED FOR BATHING: A LITTLE
DRESSING REGULAR LOWER BODY CLOTHING: A LITTLE
TOILETING: A LITTLE
MOVING FROM LYING ON BACK TO SITTING ON SIDE OF FLAT BED: A LITTLE

## 2018-09-03 ASSESSMENT — PAIN SCALES - GENERAL
PAINLEVEL_OUTOF10: 8
PAINLEVEL_OUTOF10: 10
PAINLEVEL_OUTOF10: 10
PAINLEVEL_OUTOF10: 8
PAINLEVEL_OUTOF10: 10
PAINLEVEL_OUTOF10: ASSUMED PAIN PRESENT
PAINLEVEL_OUTOF10: 7
PAINLEVEL_OUTOF10: 8
PAINLEVEL_OUTOF10: 8
PAINLEVEL_OUTOF10: 7
PAINLEVEL_OUTOF10: 7
PAINLEVEL_OUTOF10: ASSUMED PAIN PRESENT
PAINLEVEL_OUTOF10: 10
PAINLEVEL_OUTOF10: 9

## 2018-09-03 ASSESSMENT — ENCOUNTER SYMPTOMS
MYALGIAS: 1
HEADACHES: 0
COUGH: 0
WEIGHT LOSS: 0
BLURRED VISION: 0
HEARTBURN: 0
DIZZINESS: 0
SPUTUM PRODUCTION: 0
CHILLS: 0
DOUBLE VISION: 0
NAUSEA: 0
VOMITING: 0
FEVER: 0
ORTHOPNEA: 0
PHOTOPHOBIA: 0
PALPITATIONS: 0
HEMOPTYSIS: 0
TINGLING: 0

## 2018-09-03 ASSESSMENT — COPD QUESTIONNAIRES
DURING THE PAST 4 WEEKS HOW MUCH DID YOU FEEL SHORT OF BREATH: NONE/LITTLE OF THE TIME
DO YOU EVER COUGH UP ANY MUCUS OR PHLEGM?: NO/ONLY WITH OCCASIONAL COLDS OR INFECTIONS
COPD SCREENING SCORE: 2
HAVE YOU SMOKED AT LEAST 100 CIGARETTES IN YOUR ENTIRE LIFE: YES
IN THE PAST 12 MONTHS DO YOU DO LESS THAN YOU USED TO BECAUSE OF YOUR BREATHING PROBLEMS: DISAGREE/UNSURE

## 2018-09-03 ASSESSMENT — LIFESTYLE VARIABLES: ALCOHOL_USE: NO

## 2018-09-03 ASSESSMENT — ACTIVITIES OF DAILY LIVING (ADL): TOILETING: INDEPENDENT

## 2018-09-03 NOTE — DIETARY
Nutrition Services:    Wt Loss on Nutrition Admit Screen. Pt is currently on a regular, lactose free per pt diet and per chart pt PO %. Pt is eating well. Per chart review pt's wt on 6/12: 79.2 kg via stand up scale. No wt loss noted. Ht: 170.2 cm, Wt: 81 kg, BMI 27.97.  Consult RD as needed. RD will re-screen weekly.      RD available prn

## 2018-09-03 NOTE — THERAPY
"Occupational Therapy Evaluation completed.   Functional Status:  Pt is a 37 y/o female admitted for GLF. Pt had a closed fx of L ankle with nonunion. Pt is s/p L ankle ORIF. Pt is TTWB of LLE. Pt has a R ankle sprain as well but is WBAT. Pt has severe fibromyalgia, often does self stretching to alleviate pain. Pt lives with 7 y/o daughter in a single story condo with 2 steps to get into home. Pt reports she does not get along well with mother to come and assist her, but states she feels that she has adequate support regardless. Pt was SPV to EOB, SPV transfer to FWW level. Pt able to maintain TTWB status. Pt ambulated to bathroom and transferred Min A. Pt advised to get a BSC to assist with transfers to toilet and TTB for transfer to shower.Pt ambulated 100' SPV. Pt would benefit from home health to improve ADL care at time of d/c.   Plan of Care: Will benefit from Occupational Therapy 3 times per week  Discharge Recommendations:  Equipment: Tub Transfer Bench and Bedside Commode. Post-acute therapy Discharge to home with outpatient or home health for additional skilled therapy services.    See \"Rehab Therapy-Acute\" Patient Summary Report for complete documentation.    "

## 2018-09-03 NOTE — CARE PLAN
Problem: Bowel/Gastric:  Goal: Normal bowel function is maintained or improved  Outcome: PROGRESSING SLOWER THAN EXPECTED  Refuses stool softeners at this time despite reporting chronic constipation     Problem: Mobility  Goal: Risk for activity intolerance will decrease  Outcome: PROGRESSING SLOWER THAN EXPECTED  Unable to mobilize 50 + feet POD #0. Pt is ttwb on LLE and WBAT on RLE however pt unable to WB on RLE extremity at this time due to pain from ankle sprain.

## 2018-09-03 NOTE — PROGRESS NOTES
"   Orthopaedic PA Progress Note    Interval changes:Doing well other than c/o numbness. Ready for DC from Ortho standpoint when cleared by Med/OT/PT    ROS - Patient denies any new issues. No chest pain, dyspnea, or fever.  Pain well controlled.    Blood pressure 103/67, pulse 76, temperature 36.2 °C (97.1 °F), resp. rate 16, height 1.702 m (5' 7\"), weight 81 kg (178 lb 9.2 oz), last menstrual period 08/03/2018, SpO2 95 %, not currently breastfeeding.    Patient seen and examined  No acute distress  Breathing non labored  RRR  Surgical dressing is clean, dry, and intact. Patient clearly fires tibialis anterior, EHL, and gastrocnemius/soleus. Sensation is intact to light touch throughout superficial peroneal, deep peroneal, tibial, saphenous, and sural nerve distributions. Strong and palpable 2+ dorsalis pedis and posterior tibial pulses with capillary refill less than 2 seconds. No lower leg tenderness or discomfort.    Recent Labs      09/02/18   1545   WBC  9.4   RBC  4.84   HEMOGLOBIN  14.4   HEMATOCRIT  43.3   MCV  89.5   MCH  29.8   MCHC  33.3*   RDW  41.1   PLATELETCT  346   MPV  11.3       Active Hospital Problems    Diagnosis   • Recurrent major depressive disorder, in partial remission (HCC) [F33.41]     Priority: Low   • Fibromyalgia [M79.7]     Priority: Low   • Closed fracture of left ankle with nonunion [S82.892K]   • Psychophysiological insomnia [F51.04]       Assessment/Plan:  POD#1 S/P ORIF L ankle  Wt bearing status - TTWB, OK to bear weight thru knee LLE  PT/OT-initiated  Wound care:Splint dressing intact  Drains - no  Ritter-no  Sutures/Staples out- 10-14 days post operatively  Antibiotics: complete  DVT Prophylaxis- TEDS/SCDs/Foot pumps.   Lovenox: not indicated  Future Procedures - none anticipated  Case Coordination for Discharge Planning - Disposition clear for DC from Ortho standpoint  Follow-Up: needs appointment with Dr. Jaramillo at [facility] at 10-14 days post-op for " re-evaluation, staple removal and radiographs.

## 2018-09-03 NOTE — ASSESSMENT & PLAN NOTE
Ortho input is noted  S/p left ankel fixation  Continue with current pain meds  Pt and ot inputs are noted  Pharmacy consult for pain management  Spent a lot of time answering her questions

## 2018-09-03 NOTE — PROGRESS NOTES
[facility] Hospitalist Progress Note    Date of Service: 9/3/2018    Chief Complaint  38 y.o. female admitted 2018 with left ankel fx and fibromyalgia      Interval Problem Update  S/p left ankel fixation    Consultants/Specialty  ortho    Disposition  none        Review of Systems   Constitutional: Negative for chills, fever and weight loss.   HENT: Negative for ear pain, hearing loss and tinnitus.    Eyes: Negative for blurred vision, double vision and photophobia.   Respiratory: Negative for cough, hemoptysis and sputum production.    Cardiovascular: Negative for chest pain, palpitations and orthopnea.   Gastrointestinal: Negative for heartburn, nausea and vomiting.   Genitourinary: Negative for dysuria, frequency and urgency.   Musculoskeletal: Positive for joint pain (left ankel) and myalgias (chronic).   Skin: Negative for rash.   Neurological: Negative for dizziness, tingling and headaches.      Physical Exam  Laboratory/Imaging   Hemodynamics  Temp (24hrs), Av.6 °C (97.8 °F), Min:36.2 °C (97.2 °F), Max:36.9 °C (98.5 °F)   Temperature: 36.3 °C (97.3 °F)  Pulse  Av.7  Min: 78  Max: 107 Heart Rate (Monitored): 95  Blood Pressure: 101/66, NIBP: 125/94      Respiratory      Respiration: 16, Pulse Oximetry: 92 %, O2 Daily Delivery Respiratory : Room Air with O2 Available        RUL Breath Sounds: Clear;Diminished, RML Breath Sounds: Diminished, RLL Breath Sounds: Diminished, SOLE Breath Sounds: Clear;Diminished, LLL Breath Sounds: Diminished    Fluids    Intake/Output Summary (Last 24 hours) at 18 0837  Last data filed at 18 2237   Gross per 24 hour   Intake              630 ml   Output               10 ml   Net              620 ml       Nutrition  Orders Placed This Encounter   Procedures   • Diet Order Regular     Standing Status:   Standing     Number of Occurrences:   1     Order Specific Question:   Diet:     Answer:   Regular [1]     Order Specific Question:   Miscellaneous modifications:      Answer:   Lactose Free per PT [7]     Physical Exam   Constitutional: She is oriented to person, place, and time. She appears well-developed and well-nourished.   HENT:   Head: Normocephalic and atraumatic.   Eyes: Pupils are equal, round, and reactive to light. Conjunctivae are normal.   Neck: Normal range of motion. Neck supple.   Cardiovascular: Normal rate and regular rhythm.    Pulmonary/Chest: Effort normal and breath sounds normal.   Abdominal: Soft. Bowel sounds are normal.   Musculoskeletal:   Cast on the left ankel and brace on the right ankel   Neurological: She is alert and oriented to person, place, and time.   Skin: Skin is warm and dry.       Recent Labs      09/02/18   1545   WBC  9.4   RBC  4.84   HEMOGLOBIN  14.4   HEMATOCRIT  43.3   MCV  89.5   MCH  29.8   MCHC  33.3*   RDW  41.1   PLATELETCT  346   MPV  11.3     Recent Labs      09/02/18   1545   SODIUM  138   POTASSIUM  4.5   CHLORIDE  108   CO2  20   GLUCOSE  105*   BUN  15   CREATININE  0.91   CALCIUM  10.1                      Assessment/Plan     * Closed fracture of left ankle with nonunion- (present on admission)   Assessment & Plan    Ortho input is noted  S/p left ankel fixation  Continue with current pain meds  Pt and ot        Psychophysiological insomnia- (present on admission)   Assessment & Plan    Continue trazodone        Recurrent major depressive disorder, in partial remission (HCC)- (present on admission)   Assessment & Plan    Continue Effexor continue trazodone        Fibromyalgia- (present on admission)   Assessment & Plan    Continue p.o. Neurontin          Quality-Core Measures   Ritter catheter::  No Ritter

## 2018-09-03 NOTE — CARE PLAN
"Problem: Pain Management  Goal: Pain level will decrease to patient's comfort goal  Outcome: PROGRESSING AS EXPECTED  Ortho PA, Francisco, recommending to limit use of IV pain medications; Discussed this w pt which caused increased anxiety; Pt reporting at this time that pain to LLE sx site is mostly 2/2 numbness w \"pins and needles\"; Reviewed pain scale, pain medications (along w indications for each and time scheduled vs. Prn), pt states understanding; All questions and concerns 2/2 pain addressed at this time    Problem: Psychosocial Needs:  Goal: Level of anxiety will decrease  Outcome: PROGRESSING SLOWER THAN EXPECTED  Pt w anxiety 2/2 fear of being DC home too early and concern that pain will not be managed; Spending >15/hour answering pt call bell, discussing POC, addressing needs, and providing emotional support    Problem: Safety  Goal: Will remain free from injury  Outcome: PROGRESSING AS EXPECTED  Proper signs communicated in pts doorway; floor clear of clutter, debris, or cords; pts personal items and call light within reach; pt educated to use call light for assistance and prior to getting out of bed    Problem: Mobility  Goal: Risk for activity intolerance will decrease  Outcome: PROGRESSING AS EXPECTED  Pt OOB to bathroom w SBA +FWW, tolerates well; Pt is able to maintain weight bearing restrictions      "

## 2018-09-03 NOTE — PROGRESS NOTES
2 RN skin check with aishwarya BOSWELL. Abrasions to right knee. Swelling to right ankle air splint in place. Splint and surgical dressing in place to left ankle. Otherwise skin intact.

## 2018-09-03 NOTE — PROGRESS NOTES
Updated mother Leticia via telephone. PACU RN will update family again when room assignment is made.

## 2018-09-03 NOTE — OR NURSING
Patient to preop (in PACU). Voided in bedpan without problems. Patient ate last at 1330, surgeon and anesthesiologist aware. Awaiting surgeon and anesthesiologist.

## 2018-09-03 NOTE — H&P
Hospital Medicine History & Physical Note    Date of Service  9/2/2018    Primary Care Physician  Elvira Knutson M.D.    Consultants  Dr mack ortho    Code Status  full    Chief Complaint  B/l ankle pain    History of Presenting Illness  38 y.o. female who presented 9/2/2018 with past history of fibromyalgia who tripped and fell .  She she injured both ankles.  Both ankles became swollen and painful.  The left ankle worse than the right.  She is evaluated emergency room she has a right ankle sprain left ankle fracture .patient go to the OR today.  Left ankle pain , throbbing ,intensity is 8 out of 10 ,constant, worse with movement    Review of Systems  Review of Systems   Constitutional: Positive for malaise/fatigue.   HENT: Negative.    Respiratory: Negative.    Cardiovascular: Negative.    Gastrointestinal: Negative.    Genitourinary: Negative.    Musculoskeletal: Positive for joint pain and myalgias.   Skin: Negative.    Neurological: Negative.    Psychiatric/Behavioral: Negative.    All other systems reviewed and are negative.      Past Medical History   has a past medical history of Anxiety; Anxiety and depression (06/12/2018); Back pain; Bowel habit changes (06/12/2018); Bronchitis; Depression; Fibromyalgia; Hayfever (06/12/2018); Indigestion (06/12/2018); Insomnia; Muscle spasm (06/12/2018); Neck pain; Panic attacks; Shoulder pain; Stress incontinence (06/12/2018); and TMJ syndrome.    Surgical History   has a past surgical history that includes tonsillectomy; tubal ligation; other; and bladder suspension (6/27/2018).     Family History  family history includes Allergies in her unknown relative; Diabetes in her unknown relative; Other in her unknown relative.     Social History   reports that she has been smoking Cigarettes.  She has been smoking about 0.50 packs per day. She has never used smokeless tobacco. She reports that she does not drink alcohol or use drugs.    Allergies  Allergies   Allergen  "Reactions   • Latex Rash   • Sulfa Drugs Unspecified     \"Muscle spasm\".       • Codeine Unspecified     \"Childhood allergie, not sure what happens\".         Medications  Prior to Admission Medications   Prescriptions Last Dose Informant Patient Reported? Taking?   acetaminophen (TYLENOL) 325 MG Tab 8/31/2018 at Unknown Patient Yes Yes   Sig: Take 650 mg by mouth every four hours as needed for Moderate Pain.   gabapentin (NEURONTIN) 300 MG Cap 9/2/2018 at 0900 Patient Yes No   Sig: Take 300-600 mg by mouth 3 times a day. Today (9/2/2018) pt took 900MG @ 0900.  Pt takes:  600MG in AM  300MG noon  300MG HS   ibuprofen (MOTRIN) 200 MG Tab 8/31/2018 at Unknown Patient Yes Yes   Sig: Take 600-800 mg by mouth every 6 hours as needed for Mild Pain.   lamoTRIgine (LAMICTAL) 100 MG Tab 9/2/2018 at 1000 Patient Yes No   Sig: Take 100 mg by mouth every day.   traZODone (DESYREL) 150 MG Tab 9/2/2018 at 0100 Patient Yes Yes   Sig: Take  mg by mouth every evening.   venlafaxine (EFFEXOR XR) 150 MG extended-release capsule 9/2/2018 at 1000 Patient Yes Yes   Sig: Take 150 mg by mouth every day.      Facility-Administered Medications: None       Physical Exam  Blood Pressure: 122/65   Temperature: 36.4 °C (97.5 °F)   Pulse: 87   Respiration: 16   Pulse Oximetry: 95 %     Physical Exam   Constitutional: She is oriented to person, place, and time. She appears well-developed and well-nourished. She appears distressed.   HENT:   Head: Atraumatic.   Mouth/Throat: No oropharyngeal exudate.   Eyes: Pupils are equal, round, and reactive to light. Left eye exhibits no discharge. No scleral icterus.   Neck: Normal range of motion. Neck supple. No JVD present. No tracheal deviation present. No thyromegaly present.   Cardiovascular: Normal rate and intact distal pulses.  Exam reveals no gallop and no friction rub.    Pulmonary/Chest: Effort normal and breath sounds normal. No respiratory distress. She has no wheezes. She has no rales. "   Abdominal: Soft. Bowel sounds are normal. She exhibits no distension. There is no tenderness.   Musculoskeletal: She exhibits edema (left foot) and tenderness (left ankle).   Neurological: She is alert and oriented to person, place, and time. No cranial nerve deficit. Coordination normal.   Skin: No rash noted. No erythema.       Laboratory:  Recent Labs      09/02/18   1545   WBC  9.4   RBC  4.84   HEMOGLOBIN  14.4   HEMATOCRIT  43.3   MCV  89.5   MCH  29.8   MCHC  33.3*   RDW  41.1   PLATELETCT  346   MPV  11.3     Recent Labs      09/02/18   1545   SODIUM  138   POTASSIUM  4.5   CHLORIDE  108   CO2  20   GLUCOSE  105*   BUN  15   CREATININE  0.91   CALCIUM  10.1     Recent Labs      09/02/18   1545   GLUCOSE  105*                 No results found for: TROPONINI    Urinalysis:    No results found     Imaging:  DX-ANKLE 2- VIEWS LEFT   Final Result      Anatomic alignment bimalleolar fracture left ankle following ORIF.      DX-ANKLE 3+ VIEWS LEFT   Final Result      Bimalleolar ankle fracture.      DX-ANKLE 3+ VIEWS RIGHT   Final Result      Soft tissue swelling without evidence of fracture.      DX-PORTABLE FLUOROSCOPY < 1 HOUR Is the patient pregnant? No    (Results Pending)         Assessment/Plan:  I anticipate this patient will require at least two midnights for appropriate medical management, necessitating inpatient admission.    * Closed fracture of left ankle with nonunion- (present on admission)   Assessment & Plan    N.p.o.  IV Dilaudid  for pain    IV hydration    [place] consult.. Surgery tonight  Bed rest        Psychophysiological insomnia- (present on admission)   Assessment & Plan    Continue trazodone        Recurrent major depressive disorder, in partial remission (HCC)- (present on admission)   Assessment & Plan    Continue Effexor continue trazodone        Fibromyalgia- (present on admission)   Assessment & Plan    Continue p.o. Neurontin            VTE prophylaxis: scd

## 2018-09-03 NOTE — ED NOTES
Med rec updated and complete  Allergies reviewed  Pt reports no antibiotics in the last 30 days.  Pt reports that she took her GABAPENTIN 900MG today (9/2/2018) @ 0900.   Pt reports no vitamins.

## 2018-09-03 NOTE — OP REPORT
DATE OF SERVICE:  09/02/2018    PREOPERATIVE DIAGNOSIS:  Left trimalleolar ankle fracture.    POSTOPERATIVE DIAGNOSIS:  Left trimalleolar ankle fracture.    PROCEDURE:  Open reduction and internal fixation left trimalleolar ankle   fracture without fixation of posterior lip.    SURGEON:  Dayday Jaramillo MD    ASSISTANT:  Robert Hobbs PA-C    ESTIMATED BLOOD LOSS:  None.    INDICATIONS:  This is a 38-year-old female who is status post twisting injury   to her ankle during which she sustained a left trimalleolar ankle fracture.    Risks and benefits of open reduction and internal fixation were discussed at   length which include but not limited to bleeding, infection, neurovascular   damage, pain, stiffness, malunion, nonunion, DVT, PE, MI, stroke, and death.    They understand all these risks and wished to proceed.    DESCRIPTION OF PROCEDURE:  Patient was sedated with LMA anesthesia and   administered preoperative antibiotics.  Her left lower extremity was prepped   and draped in the usual sterile fashion and a well-padded tourniquet was   inflated to 200 mmHg prior to incision.  The left lower extremity was prepped   and draped in usual fashion.  Standard lateral approach to the ankle was   performed with care taken to avoid all neurovascular structures.  The fracture   was reduced in anatomic position and held with a mini fragment lag screw   followed by [facility] distal fibular locking plate with a combination of locking and   nonlocking fixation.  The wound was irrigated, closed with 2-0 Vicryl suture   and staples.  Attention was then turned to the medial malleolus which was   reduced percutaneously and held with a single C cannulated screw.  The   posterior malleolus reduced anatomically and as a result, no fixation was   placed on this.  Wounds were irrigated, closed with staples.  Sterile   dressings were applied.  Patient tolerated the procedure well.    POSTOPERATIVE PLAN:  The patient to be admitted  as she has an ankle on the   contralateral side and feels like she can weightbear on this.  She will need   to be mobilized with physical therapy, pain control, and pain management for   her fibromyalgia.       ____________________________________     SADE TEJADA MD PLA / STEPHEN    DD:  09/02/2018 21:39:41  DT:  09/02/2018 22:28:20    D#:  [id number]  Job#:  [id number]

## 2018-09-03 NOTE — OR NURSING
Patient's home wheelchair delivered to surgery waiting area, this RN directed  to patient's room on Orthopedics where she will be brought after PACU.     Patient is requesting a specialty bed for her fibromyalgia pain. States she cannot be on a regular hospital mattress. Will contact charge nurse on Ortho after the change of their shift is over to inform them of the request.

## 2018-09-03 NOTE — THERAPY
Physical Therapy Contact Note    PT consult received; pt with multiple visitors and uncontrolled pain upon attempt; will reattempt when able;    Alta Starkey, PT, DPT Pager: [phone]

## 2018-09-03 NOTE — CONSULTS
"9/2/2018    Florencia Lau is a 38 y.o. female who presents after a fall with a left ankle fracture and is here for operative management. Patient denies numbness, parasthesias, loss of concousness or other trauma    Past Medical History:   Diagnosis Date   • Anxiety    • Anxiety and depression 06/12/2018   • Back pain    • Bowel habit changes 06/12/2018    \"Constipation/Diarrhea\"   • Bronchitis     HX OF   • Depression    • Fibromyalgia    • Hayfever 06/12/2018   • Indigestion 06/12/2018   • Insomnia    • Muscle spasm 06/12/2018    \"My neurologist told me I have a muscle spasm disorder\"   • Neck pain    • Panic attacks    • Shoulder pain    • Stress incontinence 06/12/2018   • TMJ syndrome        Past Surgical History:   Procedure Laterality Date   • BLADDER SUSPENSION  6/27/2018    Procedure: BLADDER SUSPENSION-   FOR: TRANS OBTURATOR TAPE;  Surgeon: Chapin Banks M.D.;  Location: SURGERY SAME DAY [facility];  Service: Gynecology   • OTHER      neck abscess   • TONSILLECTOMY     • TUBAL LIGATION         Medications  No current facility-administered medications on file prior to encounter.      Current Outpatient Prescriptions on File Prior to Encounter   Medication Sig Dispense Refill   • gabapentin (NEURONTIN) 300 MG Cap Take 300-600 mg by mouth 3 times a day. Today (9/2/2018) pt took 900MG @ 0900.  Pt takes:  600MG in AM  300MG noon  300MG HS     • lamoTRIgine (LAMICTAL) 100 MG Tab Take 100 mg by mouth every day.         Allergies  Latex; Sulfa drugs; and Codeine    ROS  Left ankle pain and deformity. All other systems were reviewed and found to be negative    Family History   Problem Relation Age of Onset   • Diabetes Unknown    • Allergies Unknown         RA   • Other Unknown         DIVERTICULITIS, LUPUS, DDD, FM       Social History     Social History   • Marital status: Single     Spouse name: N/A   • Number of children: N/A   • Years of education: N/A     Social History Main Topics   • Smoking " status: Current Every Day Smoker     Packs/day: 0.50     Types: Cigarettes   • Smokeless tobacco: Never Used      Comment: 1 PK per day   • Alcohol use No   • Drug use: No   • Sexual activity: Not on file     Other Topics Concern   • Not on file     Social History Narrative   • No narrative on file       Physical Exam  Vitals  Blood pressure 122/65, pulse 87, temperature 36.4 °C (97.5 °F), resp. rate 16, weight 81 kg (178 lb 9.2 oz), last menstrual period 08/03/2018, SpO2 95 %.  General: Well Developed, Well Nourished, no acute distress  HEENT: Normocephalic, atraumatic  Eyes: Anicteric, PERRLA, EOMI  Neck: Supple, nontender, no masses  Lungs: CTA, no wheezes or crackles  Heart: RRR, no murmurs, rubs or gallops  Abdomen: Soft, NT, ND  Pelvis: Stable to AP and Lateral Compression  Skin: Intact, no open wounds  Extremities: Left ankle pain and deformity  Neuro: NVI  Vascular: 2+DP/PT, Capillary refill <2 seconds    Radiographs:  DX-ANKLE 3+ VIEWS LEFT   Final Result      Bimalleolar ankle fracture.      DX-ANKLE 3+ VIEWS RIGHT   Final Result      Soft tissue swelling without evidence of fracture.      DX-ANKLE 2- VIEWS LEFT    (Results Pending)   DX-PORTABLE FLUORO > 1 HOUR    (Results Pending)       Laboratory Values  Recent Labs      09/02/18   1545   WBC  9.4   RBC  4.84   HEMOGLOBIN  14.4   HEMATOCRIT  43.3   MCV  89.5   MCH  29.8   MCHC  33.3*   RDW  41.1   PLATELETCT  346   MPV  11.3     Recent Labs      09/02/18   1545   SODIUM  138   POTASSIUM  4.5   CHLORIDE  108   CO2  20   GLUCOSE  105*   BUN  15             Impression: Left trimalleolar ankle fracture    Plan:Operative intervention. Risks and benefits of surgery were discussed which include but are not limited to bleeding, infection, neurovascular damage, malunion, nonunion, instability, limb length discrepancy, DVT, PE, MI, Stroke and death. They understand these risks and wish to proceed.

## 2018-09-03 NOTE — ED NOTES
Pt report called to pre-op and they stated they will go ahead and take her to pre-op now instead of going to the floor. Transport here to take pt.

## 2018-09-04 LAB
ALBUMIN SERPL BCP-MCNC: 3.4 G/DL (ref 3.2–4.9)
ALBUMIN/GLOB SERPL: 1.5 G/DL
ALP SERPL-CCNC: 60 U/L (ref 30–99)
ALT SERPL-CCNC: 31 U/L (ref 2–50)
ANION GAP SERPL CALC-SCNC: 8 MMOL/L (ref 0–11.9)
APPEARANCE UR: CLEAR
AST SERPL-CCNC: 68 U/L (ref 12–45)
BACTERIA #/AREA URNS HPF: NEGATIVE /HPF
BASOPHILS # BLD AUTO: 0.3 % (ref 0–1.8)
BASOPHILS # BLD: 0.02 K/UL (ref 0–0.12)
BILIRUB SERPL-MCNC: 0.2 MG/DL (ref 0.1–1.5)
BILIRUB UR QL STRIP.AUTO: NEGATIVE
BUN SERPL-MCNC: 12 MG/DL (ref 8–22)
CALCIUM SERPL-MCNC: 8.4 MG/DL (ref 8.5–10.5)
CHLORIDE SERPL-SCNC: 108 MMOL/L (ref 96–112)
CO2 SERPL-SCNC: 27 MMOL/L (ref 20–33)
COLOR UR: YELLOW
CREAT SERPL-MCNC: 0.68 MG/DL (ref 0.5–1.4)
EOSINOPHIL # BLD AUTO: 0.12 K/UL (ref 0–0.51)
EOSINOPHIL NFR BLD: 1.7 % (ref 0–6.9)
EPI CELLS #/AREA URNS HPF: ABNORMAL /HPF
ERYTHROCYTE [DISTWIDTH] IN BLOOD BY AUTOMATED COUNT: 43 FL (ref 35.9–50)
GLOBULIN SER CALC-MCNC: 2.2 G/DL (ref 1.9–3.5)
GLUCOSE SERPL-MCNC: 108 MG/DL (ref 65–99)
GLUCOSE UR STRIP.AUTO-MCNC: NEGATIVE MG/DL
HCT VFR BLD AUTO: 34.2 % (ref 37–47)
HGB BLD-MCNC: 11.2 G/DL (ref 12–16)
HYALINE CASTS #/AREA URNS LPF: ABNORMAL /LPF
IMM GRANULOCYTES # BLD AUTO: 0.02 K/UL (ref 0–0.11)
IMM GRANULOCYTES NFR BLD AUTO: 0.3 % (ref 0–0.9)
KETONES UR STRIP.AUTO-MCNC: NEGATIVE MG/DL
LEUKOCYTE ESTERASE UR QL STRIP.AUTO: ABNORMAL
LYMPHOCYTES # BLD AUTO: 1.86 K/UL (ref 1–4.8)
LYMPHOCYTES NFR BLD: 25.8 % (ref 22–41)
MCH RBC QN AUTO: 30.7 PG (ref 27–33)
MCHC RBC AUTO-ENTMCNC: 32.7 G/DL (ref 33.6–35)
MCV RBC AUTO: 93.7 FL (ref 81.4–97.8)
MICRO URNS: ABNORMAL
MONOCYTES # BLD AUTO: 0.55 K/UL (ref 0–0.85)
MONOCYTES NFR BLD AUTO: 7.6 % (ref 0–13.4)
NEUTROPHILS # BLD AUTO: 4.64 K/UL (ref 2–7.15)
NEUTROPHILS NFR BLD: 64.3 % (ref 44–72)
NITRITE UR QL STRIP.AUTO: NEGATIVE
NRBC # BLD AUTO: 0 K/UL
NRBC BLD-RTO: 0 /100 WBC
PH UR STRIP.AUTO: 5.5 [PH]
PLATELET # BLD AUTO: 208 K/UL (ref 164–446)
PMV BLD AUTO: 11.1 FL (ref 9–12.9)
POTASSIUM SERPL-SCNC: 3.7 MMOL/L (ref 3.6–5.5)
PROT SERPL-MCNC: 5.6 G/DL (ref 6–8.2)
PROT UR QL STRIP: NEGATIVE MG/DL
RBC # BLD AUTO: 3.65 M/UL (ref 4.2–5.4)
RBC # URNS HPF: ABNORMAL /HPF
RBC UR QL AUTO: NEGATIVE
SODIUM SERPL-SCNC: 143 MMOL/L (ref 135–145)
SP GR UR STRIP.AUTO: 1.02
SPERM #/AREA URNS HPF: ABNORMAL /HPF
UROBILINOGEN UR STRIP.AUTO-MCNC: 0.2 MG/DL
WBC # BLD AUTO: 7.2 K/UL (ref 4.8–10.8)
WBC #/AREA URNS HPF: ABNORMAL /HPF

## 2018-09-04 PROCEDURE — 97535 SELF CARE MNGMENT TRAINING: CPT

## 2018-09-04 PROCEDURE — 700102 HCHG RX REV CODE 250 W/ 637 OVERRIDE(OP): Performed by: FAMILY MEDICINE

## 2018-09-04 PROCEDURE — 51798 US URINE CAPACITY MEASURE: CPT

## 2018-09-04 PROCEDURE — G8979 MOBILITY GOAL STATUS: HCPCS | Mod: CI

## 2018-09-04 PROCEDURE — 770006 HCHG ROOM/CARE - MED/SURG/GYN SEMI*

## 2018-09-04 PROCEDURE — 81001 URINALYSIS AUTO W/SCOPE: CPT

## 2018-09-04 PROCEDURE — 700111 HCHG RX REV CODE 636 W/ 250 OVERRIDE (IP): Performed by: FAMILY MEDICINE

## 2018-09-04 PROCEDURE — G8978 MOBILITY CURRENT STATUS: HCPCS | Mod: CJ

## 2018-09-04 PROCEDURE — 700102 HCHG RX REV CODE 250 W/ 637 OVERRIDE(OP): Performed by: HOSPITALIST

## 2018-09-04 PROCEDURE — 700112 HCHG RX REV CODE 229: Performed by: ORTHOPAEDIC SURGERY

## 2018-09-04 PROCEDURE — A9270 NON-COVERED ITEM OR SERVICE: HCPCS | Performed by: HOSPITALIST

## 2018-09-04 PROCEDURE — A9270 NON-COVERED ITEM OR SERVICE: HCPCS | Performed by: FAMILY MEDICINE

## 2018-09-04 PROCEDURE — 700111 HCHG RX REV CODE 636 W/ 250 OVERRIDE (IP): Performed by: HOSPITALIST

## 2018-09-04 PROCEDURE — 80053 COMPREHEN METABOLIC PANEL: CPT

## 2018-09-04 PROCEDURE — 700111 HCHG RX REV CODE 636 W/ 250 OVERRIDE (IP): Performed by: ORTHOPAEDIC SURGERY

## 2018-09-04 PROCEDURE — 700102 HCHG RX REV CODE 250 W/ 637 OVERRIDE(OP): Performed by: ORTHOPAEDIC SURGERY

## 2018-09-04 PROCEDURE — A9270 NON-COVERED ITEM OR SERVICE: HCPCS | Performed by: ORTHOPAEDIC SURGERY

## 2018-09-04 PROCEDURE — 97162 PT EVAL MOD COMPLEX 30 MIN: CPT

## 2018-09-04 PROCEDURE — 99232 SBSQ HOSP IP/OBS MODERATE 35: CPT | Performed by: FAMILY MEDICINE

## 2018-09-04 PROCEDURE — 36415 COLL VENOUS BLD VENIPUNCTURE: CPT

## 2018-09-04 PROCEDURE — 85025 COMPLETE CBC W/AUTO DIFF WBC: CPT

## 2018-09-04 RX ORDER — CALCIUM CARBONATE 500 MG/1
500 TABLET, CHEWABLE ORAL EVERY 4 HOURS PRN
Status: DISCONTINUED | OUTPATIENT
Start: 2018-09-04 | End: 2018-09-06 | Stop reason: HOSPADM

## 2018-09-04 RX ADMIN — GABAPENTIN 600 MG: 300 CAPSULE ORAL at 16:59

## 2018-09-04 RX ADMIN — ANTACID TABLETS 500 MG: 500 TABLET, CHEWABLE ORAL at 23:15

## 2018-09-04 RX ADMIN — LAMOTRIGINE 100 MG: 100 TABLET ORAL at 04:20

## 2018-09-04 RX ADMIN — KETOROLAC TROMETHAMINE 30 MG: 30 INJECTION, SOLUTION INTRAMUSCULAR; INTRAVENOUS at 04:21

## 2018-09-04 RX ADMIN — ACETAMINOPHEN 650 MG: 325 TABLET, FILM COATED ORAL at 16:59

## 2018-09-04 RX ADMIN — HYDROMORPHONE HYDROCHLORIDE 0.5 MG: 2 INJECTION INTRAMUSCULAR; INTRAVENOUS; SUBCUTANEOUS at 23:16

## 2018-09-04 RX ADMIN — HYDROMORPHONE HYDROCHLORIDE 0.5 MG: 2 INJECTION INTRAMUSCULAR; INTRAVENOUS; SUBCUTANEOUS at 07:53

## 2018-09-04 RX ADMIN — VENLAFAXINE HYDROCHLORIDE 150 MG: 75 CAPSULE, EXTENDED RELEASE ORAL at 04:20

## 2018-09-04 RX ADMIN — GABAPENTIN 600 MG: 300 CAPSULE ORAL at 20:29

## 2018-09-04 RX ADMIN — GABAPENTIN 600 MG: 300 CAPSULE ORAL at 06:07

## 2018-09-04 RX ADMIN — HYDROMORPHONE HYDROCHLORIDE 0.5 MG: 2 INJECTION INTRAMUSCULAR; INTRAVENOUS; SUBCUTANEOUS at 18:49

## 2018-09-04 RX ADMIN — DOCUSATE SODIUM 100 MG: 100 CAPSULE, LIQUID FILLED ORAL at 15:20

## 2018-09-04 RX ADMIN — DIPHENHYDRAMINE HYDROCHLORIDE 25 MG: 50 INJECTION, SOLUTION INTRAMUSCULAR; INTRAVENOUS at 20:36

## 2018-09-04 RX ADMIN — ANTACID TABLETS 500 MG: 500 TABLET, CHEWABLE ORAL at 17:00

## 2018-09-04 RX ADMIN — KETOROLAC TROMETHAMINE 30 MG: 30 INJECTION, SOLUTION INTRAMUSCULAR; INTRAVENOUS at 15:19

## 2018-09-04 RX ADMIN — NICOTINE 14 MG: 14 PATCH, EXTENDED RELEASE TRANSDERMAL at 04:20

## 2018-09-04 RX ADMIN — OXYCODONE HYDROCHLORIDE 10 MG: 10 TABLET ORAL at 12:53

## 2018-09-04 RX ADMIN — KETOROLAC TROMETHAMINE 30 MG: 30 INJECTION, SOLUTION INTRAMUSCULAR; INTRAVENOUS at 20:30

## 2018-09-04 RX ADMIN — HYDROCORTISONE: 10 CREAM TOPICAL at 18:00

## 2018-09-04 RX ADMIN — TIZANIDINE 4 MG: 4 TABLET ORAL at 23:15

## 2018-09-04 RX ADMIN — OXYCODONE HYDROCHLORIDE 10 MG: 10 TABLET ORAL at 09:38

## 2018-09-04 RX ADMIN — HYDROMORPHONE HYDROCHLORIDE 0.5 MG: 2 INJECTION INTRAMUSCULAR; INTRAVENOUS; SUBCUTANEOUS at 04:21

## 2018-09-04 RX ADMIN — DOCUSATE SODIUM 100 MG: 100 CAPSULE, LIQUID FILLED ORAL at 04:20

## 2018-09-04 RX ADMIN — HYDROMORPHONE HYDROCHLORIDE 0.5 MG: 2 INJECTION INTRAMUSCULAR; INTRAVENOUS; SUBCUTANEOUS at 15:19

## 2018-09-04 RX ADMIN — SENNOSIDES AND DOCUSATE SODIUM 1 TABLET: 8.6; 5 TABLET ORAL at 20:30

## 2018-09-04 RX ADMIN — HYDROMORPHONE HYDROCHLORIDE 0.5 MG: 2 INJECTION INTRAMUSCULAR; INTRAVENOUS; SUBCUTANEOUS at 00:35

## 2018-09-04 RX ADMIN — ACETAMINOPHEN 650 MG: 325 TABLET, FILM COATED ORAL at 23:15

## 2018-09-04 RX ADMIN — ENOXAPARIN SODIUM 40 MG: 40 INJECTION SUBCUTANEOUS at 12:53

## 2018-09-04 RX ADMIN — ACETAMINOPHEN 650 MG: 325 TABLET, FILM COATED ORAL at 04:20

## 2018-09-04 RX ADMIN — HYDROCORTISONE: 10 CREAM TOPICAL at 04:21

## 2018-09-04 RX ADMIN — GABAPENTIN 600 MG: 300 CAPSULE ORAL at 12:52

## 2018-09-04 RX ADMIN — HYDROMORPHONE HYDROCHLORIDE 0.5 MG: 2 INJECTION INTRAMUSCULAR; INTRAVENOUS; SUBCUTANEOUS at 11:06

## 2018-09-04 RX ADMIN — OXYCODONE HYDROCHLORIDE 10 MG: 10 TABLET ORAL at 20:29

## 2018-09-04 RX ADMIN — ACETAMINOPHEN 650 MG: 325 TABLET, FILM COATED ORAL at 12:53

## 2018-09-04 RX ADMIN — KETOROLAC TROMETHAMINE 30 MG: 30 INJECTION, SOLUTION INTRAMUSCULAR; INTRAVENOUS at 09:38

## 2018-09-04 RX ADMIN — OXYCODONE HYDROCHLORIDE 10 MG: 10 TABLET ORAL at 06:07

## 2018-09-04 RX ADMIN — OXYCODONE HYDROCHLORIDE 10 MG: 10 TABLET ORAL at 16:59

## 2018-09-04 ASSESSMENT — COGNITIVE AND FUNCTIONAL STATUS - GENERAL
CLIMB 3 TO 5 STEPS WITH RAILING: A LITTLE
MOBILITY SCORE: 23
SUGGESTED CMS G CODE MODIFIER MOBILITY: CI

## 2018-09-04 ASSESSMENT — ENCOUNTER SYMPTOMS
TINGLING: 0
MYALGIAS: 1
WEIGHT LOSS: 0
SENSORY CHANGE: 0
EYE PAIN: 0
SHORTNESS OF BREATH: 0
PHOTOPHOBIA: 0
EYE DISCHARGE: 0
SPUTUM PRODUCTION: 0
ORTHOPNEA: 0
FLANK PAIN: 0
TREMORS: 0
PALPITATIONS: 0
ABDOMINAL PAIN: 0
VOMITING: 0
DIAPHORESIS: 0
DIARRHEA: 0
WHEEZING: 0

## 2018-09-04 ASSESSMENT — PAIN SCALES - GENERAL
PAINLEVEL_OUTOF10: 5
PAINLEVEL_OUTOF10: 8
PAINLEVEL_OUTOF10: 5
PAINLEVEL_OUTOF10: 9
PAINLEVEL_OUTOF10: 9
PAINLEVEL_OUTOF10: 8
PAINLEVEL_OUTOF10: 9

## 2018-09-04 ASSESSMENT — GAIT ASSESSMENTS
GAIT LEVEL OF ASSIST: CONTACT GUARD ASSIST
ASSISTIVE DEVICE: FRONT WHEEL WALKER
DISTANCE (FEET): 40

## 2018-09-04 NOTE — PROGRESS NOTES
[facility] Hospitalist Progress Note    Date of Service: 2018    Chief Complaint  38 y.o. female admitted 2018 with left ankel fx and fibromyalgia      Interval Problem Update  S/p left ankel fixation    Consultants/Specialty  ortho    Disposition  none        Review of Systems   Constitutional: Negative for diaphoresis and weight loss.   HENT: Negative for ear discharge, ear pain and nosebleeds.    Eyes: Negative for photophobia, pain and discharge.   Respiratory: Negative for sputum production, shortness of breath and wheezing.    Cardiovascular: Negative for chest pain, palpitations and orthopnea.   Gastrointestinal: Negative for abdominal pain, diarrhea and vomiting.   Genitourinary: Negative for flank pain, frequency and hematuria.   Musculoskeletal: Positive for joint pain (left ankel) and myalgias (chronic).   Skin: Negative for rash.   Neurological: Negative for tingling, tremors and sensory change.      Physical Exam  Laboratory/Imaging   Hemodynamics  Temp (24hrs), Av.3 °C (97.4 °F), Min:36.1 °C (97 °F), Max:36.8 °C (98.3 °F)   Temperature: 36.4 °C (97.6 °F)  Pulse  Av  Min: 76  Max: 107    Blood Pressure: 102/61      Respiratory      Respiration: 18, Pulse Oximetry: 95 %        RUL Breath Sounds: Clear, RML Breath Sounds: Clear, RLL Breath Sounds: Diminished, SOLE Breath Sounds: Clear, LLL Breath Sounds: Diminished    Fluids    Intake/Output Summary (Last 24 hours) at 18 1146  Last data filed at 18 1700   Gross per 24 hour   Intake             1080 ml   Output                0 ml   Net             1080 ml       Nutrition  Orders Placed This Encounter   Procedures   • Diet Order Regular     Standing Status:   Standing     Number of Occurrences:   1     Order Specific Question:   Diet:     Answer:   Regular [1]     Order Specific Question:   Miscellaneous modifications:     Answer:   Lactose Free per PT [7]     Physical Exam   Constitutional: She is oriented to person, place, and  time. No distress.   HENT:   Right Ear: External ear normal.   Left Ear: External ear normal.   Eyes: Right eye exhibits no discharge. Left eye exhibits no discharge.   Neck: No JVD present.   Cardiovascular: Normal heart sounds.    Pulmonary/Chest: No stridor. No respiratory distress. She has no wheezes. She has no rales.   Abdominal: She exhibits no distension. There is no tenderness. There is no rebound.   Musculoskeletal:   Cast on the left ankel and brace on the right ankel   Neurological: She is alert and oriented to person, place, and time.   Skin: Skin is warm and dry. She is not diaphoretic.       Recent Labs      09/02/18   1545  09/04/18   0420   WBC  9.4  7.2   RBC  4.84  3.65*   HEMOGLOBIN  14.4  11.2*   HEMATOCRIT  43.3  34.2*   MCV  89.5  93.7   MCH  29.8  30.7   MCHC  33.3*  32.7*   RDW  41.1  43.0   PLATELETCT  346  208   MPV  11.3  11.1     Recent Labs      09/02/18   1545  09/04/18   0420   SODIUM  138  143   POTASSIUM  4.5  3.7   CHLORIDE  108  108   CO2  20  27   GLUCOSE  105*  108*   BUN  15  12   CREATININE  0.91  0.68   CALCIUM  10.1  8.4*                      Assessment/Plan     * Closed fracture of left ankle with nonunion- (present on admission)   Assessment & Plan    Ortho input is noted  S/p left ankel fixation  Continue with current pain meds  Pt and ot inputs are noted  Pharmacy consult for pain management  Spent a lot of time answering her questions        Psychophysiological insomnia- (present on admission)   Assessment & Plan    Continue trazodone        Recurrent major depressive disorder, in partial remission (HCC)- (present on admission)   Assessment & Plan    Continue Effexor continue trazodone        Fibromyalgia- (present on admission)   Assessment & Plan    Continue p.o. Neurontin          Quality-Core Measures   Ritter catheter::  No Ritter  DVT prophylaxis pharmacological::  Enoxaparin (Lovenox)

## 2018-09-04 NOTE — PROGRESS NOTES
"   Orthopaedic PA Progress Note    Interval changes:25-minute visit answering questions, pt seeking reassurance, numbness has resolved, blat ankle pain this afternoon    ROS - Patient denies any new issues. No chest pain, dyspnea, or fever.  Pain well controlled.    Blood pressure 102/61, pulse 77, temperature 36.4 °C (97.6 °F), resp. rate 18, height 1.702 m (5' 7\"), weight 81 kg (178 lb 9.2 oz), last menstrual period 08/03/2018, SpO2 95 %, not currently breastfeeding.    Patient seen and examined  No acute distress  Breathing non labored  RRR  Surgical dressing is clean, dry, and intact. Patient clearly fires tibialis anterior, EHL, and gastrocnemius/soleus. Sensation is intact to light touch throughout superficial peroneal, deep peroneal, tibial, saphenous, and sural nerve distributions. Strong and palpable 2+ dorsalis pedis and posterior tibial pulses with capillary refill less than 2 seconds. No lower leg tenderness or discomfort.    Recent Labs      09/02/18   1545  09/04/18   0420   WBC  9.4  7.2   RBC  4.84  3.65*   HEMOGLOBIN  14.4  11.2*   HEMATOCRIT  43.3  34.2*   MCV  89.5  93.7   MCH  29.8  30.7   MCHC  33.3*  32.7*   RDW  41.1  43.0   PLATELETCT  346  208   MPV  11.3  11.1       Active Hospital Problems    Diagnosis   • Recurrent major depressive disorder, in partial remission ([facility]) [F33.41]     Priority: Low   • Fibromyalgia [M79.7]     Priority: Low   • Closed fracture of left ankle with nonunion [S82.892K]   • Psychophysiological insomnia [F51.04]     Assessment/Plan:  POD#1 S/P ORIF L ankle  Wt bearing status - TTWB L ankle,WBAT RLE,  OK to bear weight thru knee LLE  PT/OT-initiated  Wound care:Splint dressing intact  Drains - no  Ritter-no  Sutures/Staples out- 10-14 days post operatively  Antibiotics: complete  DVT Prophylaxis- TEDS/SCDs/Foot pumps.   Lovenox: not indicated  Future Procedures - none anticipated  Case Coordination for Discharge Planning - Disposition clear for DC from Ortho " standpoint  Follow-Up: needs appointment with Dr. Jaramillo at [facility] at 10-14 days post-op for re-evaluation, staple removal and radiographs.

## 2018-09-04 NOTE — THERAPY
"Physical Therapy Evaluation completed.   Bed Mobility:  Supine to Sit: Supervised  Transfers: Sit to Stand: Supervised  Gait: Level Of Assist: Contact Guard Assist with Front-Wheel Walker       Plan of Care: Will benefit from Physical Therapy 3 times per week  Discharge Recommendations: Equipment: Crutches.   Pt presents s/p Open reduction and internal fixation left trimalleolar ankle, now TTWB L LE. Pt was seen today for instruction in ambulation using FWW vs crutches. Pt has two stairs into her home. Pt struggled today with stairs when using crutches, and eventually decided to sit and scoot then roll onto hands and knees, pulling up into standing. Pt will then use FWW around house. Pt reports that the w/c delivered is \"too big\" to use in her home and she is interested in knee scooter. Case management to assist with this. Pt will benefit from inpt PT to answer any questions before d/c home and practice stairs again if needed.     See \"Rehab Therapy-Acute\" Patient Summary Report for complete documentation.     "

## 2018-09-04 NOTE — DISCHARGE PLANNING
Agency/Facility Name: [facility]  Spoke To: Desiree  Outcome: Wheelchair delivered on 9/2 @ 1907

## 2018-09-04 NOTE — DISCHARGE PLANNING
Anticipated Discharge Disposition: To return home to her home in [place]    Action: discussed with her w/c, knee scooter and crutches, she has a walker at home.  W/c for home at bedside, patient states it was ordered when she was in the ED    Barriers to Discharge: none    Plan: Home with w/c

## 2018-09-04 NOTE — CARE PLAN
Problem: Safety  Goal: Will remain free from falls  Outcome: PROGRESSING AS EXPECTED    Intervention: Implement fall precautions  Bed in low position, wheels locked, call light within reach, hourly rounding in place.      Problem: Mobility  Goal: Risk for activity intolerance will decrease  Outcome: PROGRESSING AS EXPECTED  Patient ambulating to bathroom and back with standby assist and FWW.

## 2018-09-04 NOTE — PROGRESS NOTES
POD#2  S/P ORIF left ankle  Right ankle sprain  TTWB LLE x 6 weeks  WBAT RLE  PT/OT  Case coordination

## 2018-09-05 LAB
ALBUMIN SERPL BCP-MCNC: 3.4 G/DL (ref 3.2–4.9)
ALBUMIN/GLOB SERPL: 1.5 G/DL
ALP SERPL-CCNC: 87 U/L (ref 30–99)
ALT SERPL-CCNC: 144 U/L (ref 2–50)
ANION GAP SERPL CALC-SCNC: 7 MMOL/L (ref 0–11.9)
AST SERPL-CCNC: 290 U/L (ref 12–45)
BASOPHILS # BLD AUTO: 0.5 % (ref 0–1.8)
BASOPHILS # BLD: 0.02 K/UL (ref 0–0.12)
BILIRUB SERPL-MCNC: 0.2 MG/DL (ref 0.1–1.5)
BUN SERPL-MCNC: 8 MG/DL (ref 8–22)
CALCIUM SERPL-MCNC: 8.7 MG/DL (ref 8.5–10.5)
CHLORIDE SERPL-SCNC: 108 MMOL/L (ref 96–112)
CO2 SERPL-SCNC: 26 MMOL/L (ref 20–33)
CREAT SERPL-MCNC: 0.62 MG/DL (ref 0.5–1.4)
EOSINOPHIL # BLD AUTO: 0.22 K/UL (ref 0–0.51)
EOSINOPHIL NFR BLD: 5 % (ref 0–6.9)
ERYTHROCYTE [DISTWIDTH] IN BLOOD BY AUTOMATED COUNT: 43 FL (ref 35.9–50)
GLOBULIN SER CALC-MCNC: 2.2 G/DL (ref 1.9–3.5)
GLUCOSE SERPL-MCNC: 99 MG/DL (ref 65–99)
HCT VFR BLD AUTO: 34.6 % (ref 37–47)
HGB BLD-MCNC: 11.3 G/DL (ref 12–16)
IMM GRANULOCYTES # BLD AUTO: 0.02 K/UL (ref 0–0.11)
IMM GRANULOCYTES NFR BLD AUTO: 0.5 % (ref 0–0.9)
LYMPHOCYTES # BLD AUTO: 1.62 K/UL (ref 1–4.8)
LYMPHOCYTES NFR BLD: 37 % (ref 22–41)
MCH RBC QN AUTO: 30.3 PG (ref 27–33)
MCHC RBC AUTO-ENTMCNC: 32.7 G/DL (ref 33.6–35)
MCV RBC AUTO: 92.8 FL (ref 81.4–97.8)
MONOCYTES # BLD AUTO: 0.26 K/UL (ref 0–0.85)
MONOCYTES NFR BLD AUTO: 5.9 % (ref 0–13.4)
NEUTROPHILS # BLD AUTO: 2.24 K/UL (ref 2–7.15)
NEUTROPHILS NFR BLD: 51.1 % (ref 44–72)
NRBC # BLD AUTO: 0 K/UL
NRBC BLD-RTO: 0 /100 WBC
PLATELET # BLD AUTO: 222 K/UL (ref 164–446)
PMV BLD AUTO: 11.3 FL (ref 9–12.9)
POTASSIUM SERPL-SCNC: 3.6 MMOL/L (ref 3.6–5.5)
PROT SERPL-MCNC: 5.6 G/DL (ref 6–8.2)
RBC # BLD AUTO: 3.73 M/UL (ref 4.2–5.4)
SODIUM SERPL-SCNC: 141 MMOL/L (ref 135–145)
WBC # BLD AUTO: 4.4 K/UL (ref 4.8–10.8)

## 2018-09-05 PROCEDURE — 700102 HCHG RX REV CODE 250 W/ 637 OVERRIDE(OP): Performed by: HOSPITALIST

## 2018-09-05 PROCEDURE — 700111 HCHG RX REV CODE 636 W/ 250 OVERRIDE (IP): Performed by: ORTHOPAEDIC SURGERY

## 2018-09-05 PROCEDURE — 97535 SELF CARE MNGMENT TRAINING: CPT

## 2018-09-05 PROCEDURE — 700102 HCHG RX REV CODE 250 W/ 637 OVERRIDE(OP): Performed by: ORTHOPAEDIC SURGERY

## 2018-09-05 PROCEDURE — 700102 HCHG RX REV CODE 250 W/ 637 OVERRIDE(OP): Performed by: FAMILY MEDICINE

## 2018-09-05 PROCEDURE — 97116 GAIT TRAINING THERAPY: CPT

## 2018-09-05 PROCEDURE — 99239 HOSP IP/OBS DSCHRG MGMT >30: CPT | Performed by: FAMILY MEDICINE

## 2018-09-05 PROCEDURE — A9270 NON-COVERED ITEM OR SERVICE: HCPCS | Performed by: FAMILY MEDICINE

## 2018-09-05 PROCEDURE — 36415 COLL VENOUS BLD VENIPUNCTURE: CPT

## 2018-09-05 PROCEDURE — A9270 NON-COVERED ITEM OR SERVICE: HCPCS | Performed by: ORTHOPAEDIC SURGERY

## 2018-09-05 PROCEDURE — 85025 COMPLETE CBC W/AUTO DIFF WBC: CPT

## 2018-09-05 PROCEDURE — 700111 HCHG RX REV CODE 636 W/ 250 OVERRIDE (IP): Performed by: FAMILY MEDICINE

## 2018-09-05 PROCEDURE — A9270 NON-COVERED ITEM OR SERVICE: HCPCS | Performed by: HOSPITALIST

## 2018-09-05 PROCEDURE — 770006 HCHG ROOM/CARE - MED/SURG/GYN SEMI*

## 2018-09-05 PROCEDURE — 80053 COMPREHEN METABOLIC PANEL: CPT

## 2018-09-05 PROCEDURE — 700112 HCHG RX REV CODE 229: Performed by: ORTHOPAEDIC SURGERY

## 2018-09-05 PROCEDURE — 700111 HCHG RX REV CODE 636 W/ 250 OVERRIDE (IP): Performed by: HOSPITALIST

## 2018-09-05 RX ORDER — KETOROLAC TROMETHAMINE 10 MG/1
10 TABLET, FILM COATED ORAL EVERY 6 HOURS PRN
Qty: 15 TAB | Refills: 0 | Status: SHIPPED | OUTPATIENT
Start: 2018-09-05 | End: 2018-11-29

## 2018-09-05 RX ORDER — HYDROCODONE BITARTRATE AND ACETAMINOPHEN 5; 325 MG/1; MG/1
1 TABLET ORAL EVERY 8 HOURS PRN
Qty: 10 TAB | Refills: 0 | Status: SHIPPED | OUTPATIENT
Start: 2018-09-05 | End: 2018-09-08

## 2018-09-05 RX ORDER — KETOROLAC TROMETHAMINE 10 MG/1
10 TABLET, FILM COATED ORAL EVERY 6 HOURS PRN
Status: DISCONTINUED | OUTPATIENT
Start: 2018-09-05 | End: 2018-09-06 | Stop reason: HOSPADM

## 2018-09-05 RX ADMIN — SENNOSIDES AND DOCUSATE SODIUM 1 TABLET: 8.6; 5 TABLET ORAL at 21:52

## 2018-09-05 RX ADMIN — HALOPERIDOL LACTATE 1 MG: 5 INJECTION, SOLUTION INTRAMUSCULAR at 15:22

## 2018-09-05 RX ADMIN — LAMOTRIGINE 100 MG: 100 TABLET ORAL at 05:37

## 2018-09-05 RX ADMIN — GABAPENTIN 600 MG: 300 CAPSULE ORAL at 12:51

## 2018-09-05 RX ADMIN — NICOTINE 14 MG: 14 PATCH, EXTENDED RELEASE TRANSDERMAL at 05:37

## 2018-09-05 RX ADMIN — TRAZODONE HYDROCHLORIDE 150 MG: 150 TABLET ORAL at 21:53

## 2018-09-05 RX ADMIN — OXYCODONE HYDROCHLORIDE 10 MG: 10 TABLET ORAL at 04:30

## 2018-09-05 RX ADMIN — DOCUSATE SODIUM 100 MG: 100 CAPSULE, LIQUID FILLED ORAL at 17:14

## 2018-09-05 RX ADMIN — GABAPENTIN 600 MG: 300 CAPSULE ORAL at 21:51

## 2018-09-05 RX ADMIN — DOCUSATE SODIUM 100 MG: 100 CAPSULE, LIQUID FILLED ORAL at 05:37

## 2018-09-05 RX ADMIN — ENOXAPARIN SODIUM 40 MG: 40 INJECTION SUBCUTANEOUS at 05:37

## 2018-09-05 RX ADMIN — VENLAFAXINE HYDROCHLORIDE 150 MG: 75 CAPSULE, EXTENDED RELEASE ORAL at 05:37

## 2018-09-05 RX ADMIN — KETOROLAC TROMETHAMINE 30 MG: 30 INJECTION, SOLUTION INTRAMUSCULAR; INTRAVENOUS at 08:49

## 2018-09-05 RX ADMIN — HYDROCORTISONE: 10 CREAM TOPICAL at 18:00

## 2018-09-05 RX ADMIN — KETOROLAC TROMETHAMINE 10 MG: 10 TABLET, FILM COATED ORAL at 21:52

## 2018-09-05 RX ADMIN — GABAPENTIN 600 MG: 300 CAPSULE ORAL at 17:14

## 2018-09-05 RX ADMIN — ACETAMINOPHEN 650 MG: 325 TABLET, FILM COATED ORAL at 12:52

## 2018-09-05 RX ADMIN — OXYCODONE HYDROCHLORIDE 10 MG: 10 TABLET ORAL at 08:16

## 2018-09-05 RX ADMIN — ACETAMINOPHEN 650 MG: 325 TABLET, FILM COATED ORAL at 17:13

## 2018-09-05 RX ADMIN — KETOROLAC TROMETHAMINE 10 MG: 10 TABLET, FILM COATED ORAL at 15:22

## 2018-09-05 RX ADMIN — OXYCODONE HYDROCHLORIDE 10 MG: 10 TABLET ORAL at 01:13

## 2018-09-05 RX ADMIN — OXYCODONE HYDROCHLORIDE 5 MG: 5 TABLET ORAL at 12:52

## 2018-09-05 RX ADMIN — ACETAMINOPHEN 650 MG: 325 TABLET, FILM COATED ORAL at 04:30

## 2018-09-05 RX ADMIN — OXYCODONE HYDROCHLORIDE 5 MG: 5 TABLET ORAL at 22:12

## 2018-09-05 RX ADMIN — TIZANIDINE 4 MG: 4 TABLET ORAL at 15:22

## 2018-09-05 RX ADMIN — KETOROLAC TROMETHAMINE 30 MG: 30 INJECTION, SOLUTION INTRAMUSCULAR; INTRAVENOUS at 01:12

## 2018-09-05 RX ADMIN — HYDROMORPHONE HYDROCHLORIDE 0.5 MG: 2 INJECTION INTRAMUSCULAR; INTRAVENOUS; SUBCUTANEOUS at 09:01

## 2018-09-05 RX ADMIN — GABAPENTIN 600 MG: 300 CAPSULE ORAL at 08:16

## 2018-09-05 ASSESSMENT — PAIN SCALES - GENERAL
PAINLEVEL_OUTOF10: 8
PAINLEVEL_OUTOF10: 7

## 2018-09-05 ASSESSMENT — COGNITIVE AND FUNCTIONAL STATUS - GENERAL
MOBILITY SCORE: 23
CLIMB 3 TO 5 STEPS WITH RAILING: A LITTLE
SUGGESTED CMS G CODE MODIFIER MOBILITY: CI

## 2018-09-05 ASSESSMENT — GAIT ASSESSMENTS
DEVIATION: ANTALGIC;STEP TO;DECREASED BASE OF SUPPORT;SHUFFLED GAIT;DECREASED HEEL STRIKE;DECREASED TOE OFF
ASSISTIVE DEVICE: FRONT WHEEL WALKER
DISTANCE (FEET): 150
GAIT LEVEL OF ASSIST: STAND BY ASSIST

## 2018-09-05 NOTE — FACE TO FACE
Face to Face Supporting Documentation - Home Health    The encounter with this patient was in whole or in part the primary reason for home health admission.    Date of encounter:   Patient:                    MRN:                       YOB: 2017  Florencia Lau  [id number]  1980     Home health to see patient for:  Physical Therapy evaluation and treatment    Skilled need for:  Comment: ORIF left ankle    Skilled nursing interventions to include:  Comment: ORIF left ankle    Homebound status evidenced by:  Needs the assistance of another person in order to leave the home. Leaving home requires a considerable and taxing effort. There is a normal inability to leave the home.    Community Physician to provide follow up care: Elvira Knutson M.D.     Optional Interventions? No      I certify the face to face encounter for this home health care referral meets the CMS requirements and the encounter/clinical assessment with the patient was, in whole, or in part, for the medical condition(s) listed above, which is the primary reason for home health care. Based on my clinical findings: the service(s) are medically necessary, support the need for home health care, and the homebound criteria are met.  I certify that this patient has had a face to face encounter by myself.  Gallo Morris M.D. - NPI: [id number]

## 2018-09-05 NOTE — CARE PLAN
Problem: Safety  Goal: Will remain free from falls  Fall precautions in place, call light and belongings within reach, bed down and locked, hourly rounding in progress. Pt calls appropriately, DME in restroom, treaded socks on when ambulating.    Problem: Mobility  Goal: Risk for activity intolerance will decrease  Pt ambulated to restroom 4x this shift, use of FWW and x1 standby assist.

## 2018-09-05 NOTE — PROGRESS NOTES
"    Blood pressure 102/67, pulse 87, temperature 36.4 °C (97.6 °F), resp. rate 16, height 1.702 m (5' 7\"), weight 81 kg (178 lb 9.2 oz), last menstrual period 08/03/2018, SpO2 91 %, not currently breastfeeding.    Recent Labs      09/02/18   1545  09/04/18   0420  09/05/18   0421   WBC  9.4  7.2  4.4*   RBC  4.84  3.65*  3.73*   HEMOGLOBIN  14.4  11.2*  11.3*   HEMATOCRIT  43.3  34.2*  34.6*   MCV  89.5  93.7  92.8   MCH  29.8  30.7  30.3   MCHC  33.3*  32.7*  32.7*   RDW  41.1  43.0  43.0   PLATELETCT  346  208  222   MPV  11.3  11.1  11.3         POD# 3 orif left trimalleolar ankle; right ankle sprain      Plan:  DVT Prophylaxis- TEDS/SCDs, LMWH  Weight Bearing Status- TTWB LLE  PT/OT  Antibiotics: perioperative complete  Case Coordination  Stable for DC home from ortho perspective          "

## 2018-09-05 NOTE — DISCHARGE SUMMARY
Discharge Summary    CHIEF COMPLAINT ON ADMISSION  Chief Complaint   Patient presents with   • Ankle Injury     bilateral ankle pain/swelling. pt states she rolled her ankles when coming down off a narrow step        Reason for Admission  Ankle pain     Admission Date  9/2/2018    CODE STATUS  Full Code    HPI & HOSPITAL COURSE  This is a 38 y.o. female here with  past history of fibromyalgia who tripped and fell .  She she injured both ankles.  Both ankles became swollen and painful.  The left ankle worse than the right.  She is evaluated emergency room she has a right ankle sprain left ankle fracture .patient go to the OR today.  Left ankle pain , throbbing ,intensity is 8 out of 10 ,constant, worse with movement.  She was admitted with Closed fracture of left ankle with nonunion.  She was seen by ortho and was taken to the O.R and had ORIF left ankle.  She was seen by pt and ot and recommended home with home health  And she was cleared by ortho for discharge.she stated that she had to push to urinate,however her UA does not show infection and her bladder scan showed 94cc of urine.i have spoken to the pt extensively with the nurses in the room and that there is no medical reason to keep her in the hospital.however she has been persistently aske to stay longer.today I along with nurse hospitalist and the pt's  Nurse d/w the pt that she has no medical reasons to stay in the hospital and she can f/u with pcp for fibromyalgia and the pain clinic.       Therefore, she is discharged in good and stable condition to home with close outpatient follow-up.    The patient met 2-midnight criteria for an inpatient stay at the time of discharge.    Discharge Date  9/5/18    FOLLOW UP ITEMS POST DISCHARGE  pcp this week or next week  Ortho in 10 to 14 days    DISCHARGE DIAGNOSES  Principal Problem:    Closed fracture of left ankle with nonunion POA: Yes  Active Problems:    Fibromyalgia POA: Yes    Recurrent major depressive  "disorder, in partial remission (HCC) POA: Yes    Psychophysiological insomnia POA: Yes  Resolved Problems:    * No resolved hospital problems. *      FOLLOW UP  Future Appointments  Date Time Provider Department Center   11/6/2018 10:00 AM Melissa P Bloch, M.D. [facility] None     Dayday Jaramillo M.D.  [address] [place] [address]  [phone]    Call in 2 days  for recheck, As needed, If symptoms worsen      MEDICATIONS ON DISCHARGE     Medication List      START taking these medications      Instructions   HYDROcodone-acetaminophen 5-325 MG Tabs per tablet  Commonly known as:  NORCO   Take 1 Tab by mouth every 8 hours as needed for up to 3 days.  Dose:  1 Tab     ketorolac 10 MG Tabs  Commonly known as:  TORADOL   Take 1 Tab by mouth every 6 hours as needed for Moderate Pain.  Dose:  10 mg        CONTINUE taking these medications      Instructions   acetaminophen 325 MG Tabs  Commonly known as:  TYLENOL   Take 650 mg by mouth every four hours as needed for Moderate Pain.  Dose:  650 mg     EFFEXOR  MG extended-release capsule  Generic drug:  venlafaxine   Take 150 mg by mouth every day.  Dose:  150 mg     gabapentin 300 MG Caps  Commonly known as:  NEURONTIN   Take 600 mg by mouth 4 times a day. Per pt recently changed to 600 four times a day  Dose:  600 mg     lamoTRIgine 100 MG Tabs  Commonly known as:  LAMICTAL   Take 100 mg by mouth every day.  Dose:  100 mg     tizanidine 4 MG Tabs  Commonly known as:  ZANAFLEX   Take 4 mg by mouth 1 time daily as needed.  Dose:  4 mg     traZODone 150 MG Tabs  Commonly known as:  DESYREL   Take 150 mg by mouth every evening.  Dose:  150 mg        STOP taking these medications    ibuprofen 200 MG Tabs  Commonly known as:  MOTRIN            Allergies  Allergies   Allergen Reactions   • Latex Rash   • Sulfa Drugs Unspecified     \"Muscle spasm\".       • Codeine Unspecified     \"Childhood allergie, not sure what happens\".     • Morphine Itching       DIET  Orders Placed " This Encounter   Procedures   • Diet Order Regular     Standing Status:   Standing     Number of Occurrences:   1     Order Specific Question:   Diet:     Answer:   Regular [1]     Order Specific Question:   Miscellaneous modifications:     Answer:   Lactose Free per PT [7]       ACTIVITY  As tolerated.  Weight bearing as tolerated as per ortho's  recommendations    CONSULTATIONS  ortho    PROCEDURES  ORIF left ankle    LABORATORY  Lab Results   Component Value Date    SODIUM 141 09/05/2018    POTASSIUM 3.6 09/05/2018    CHLORIDE 108 09/05/2018    CO2 26 09/05/2018    GLUCOSE 99 09/05/2018    BUN 8 09/05/2018    CREATININE 0.62 09/05/2018    CREATININE 0.9 08/30/2008        Lab Results   Component Value Date    WBC 4.4 (L) 09/05/2018    HEMOGLOBIN 11.3 (L) 09/05/2018    HEMATOCRIT 34.6 (L) 09/05/2018    PLATELETCT 222 09/05/2018        Total time of the discharge process exceeds 35 minutes.

## 2018-09-05 NOTE — DISCHARGE PLANNING
Agency/Facility Name: [facility]  Spoke To: Millie  Outcome: Will need new order if the patient wishes to have wheelchair switched out for knee scooter.

## 2018-09-05 NOTE — PROGRESS NOTES
Received bedside shift report from Bessie BOSWELL at 1900. Pt sitting in bed at this time.     Splint to left ankle CDI. Pt states pain 8/10, medicated per MAR. Pt mildly anxious at this time, stating that she does not feel ready to go home at this time. She states she has been having issues urinating and mentioned a consult to urology. Pt states that walking on her right ankle causes some pain, ice packs provided.     Call light and belongings within reach, bed down and locked, hourly rounding in progress. Pt calls appropriately. DME in restroom.

## 2018-09-05 NOTE — FACE TO FACE
Face to Face Supporting Documentation - Home Health    The encounter with this patient was in whole or in part the primary reason for home health admission.    Date of encounter:   Patient:                    MRN:                       YOB: 2017  Florencia Lau  [id number]  1980     Home health to see patient for:  Physical Therapy evaluation and treatment    Skilled need for:  New Onset Medical Diagnosis left leg fracture    Skilled nursing interventions to include:  Wound Care    Homebound status evidenced by:  Need the aid of supportive devices such as crutches, canes, wheelchairs or walkers. Leaving home requires a considerable and taxing effort. There is a normal inability to leave the home.    Community Physician to provide follow up care: Elvira Knutson M.D.     Optional Interventions? No      I certify the face to face encounter for this home health care referral meets the CMS requirements and the encounter/clinical assessment with the patient was, in whole, or in part, for the medical condition(s) listed above, which is the primary reason for home health care. Based on my clinical findings: the service(s) are medically necessary, support the need for home health care, and the homebound criteria are met.  I certify that this patient has had a face to face encounter by myself.  Gallo Morris M.D. - NPI: [id number]

## 2018-09-05 NOTE — THERAPY
"Pt w/improved functional mobility, including bed mobility, balance, transfers, gait, and stairs. Pt fatigues quickly d/t her fibromyalgia. Reviewed w/pt options to get up and down her stairs. Pt prefers butt scoot method since she feels unsafe w/crutches and does not have railing. Pt stated a knee scooter will be delviered to her today, discussed how to use it. Pt appears functionally capable of returning home w/fmaily support and HH serives. If pt does not have adequate family support, pt may benefit from placement at Rehab to increase her independence.    Physical Therapy Treatment completed.   Bed Mobility:  Supine to Sit: Modified Independent  Transfers: Sit to Stand: Modified Independent  Gait: Level Of Assist: Stand by Assist with Front-Wheel Walker       Plan of Care: Will benefit from Physical Therapy 3 times per week    See \"Rehab Therapy-Acute\" Patient Summary Report for complete documentation.       "

## 2018-09-06 ENCOUNTER — APPOINTMENT (OUTPATIENT)
Dept: RADIOLOGY | Facility: MEDICAL CENTER | Age: 38
DRG: 494 | End: 2018-09-06
Attending: FAMILY MEDICINE
Payer: COMMERCIAL

## 2018-09-06 VITALS
TEMPERATURE: 97.5 F | BODY MASS INDEX: 28.24 KG/M2 | HEART RATE: 53 BPM | RESPIRATION RATE: 17 BRPM | HEIGHT: 67 IN | SYSTOLIC BLOOD PRESSURE: 106 MMHG | DIASTOLIC BLOOD PRESSURE: 68 MMHG | OXYGEN SATURATION: 93 % | WEIGHT: 179.9 LBS

## 2018-09-06 PROCEDURE — 700102 HCHG RX REV CODE 250 W/ 637 OVERRIDE(OP): Performed by: ORTHOPAEDIC SURGERY

## 2018-09-06 PROCEDURE — 700102 HCHG RX REV CODE 250 W/ 637 OVERRIDE(OP): Performed by: FAMILY MEDICINE

## 2018-09-06 PROCEDURE — 700111 HCHG RX REV CODE 636 W/ 250 OVERRIDE (IP): Performed by: FAMILY MEDICINE

## 2018-09-06 PROCEDURE — A9270 NON-COVERED ITEM OR SERVICE: HCPCS | Performed by: HOSPITALIST

## 2018-09-06 PROCEDURE — 73721 MRI JNT OF LWR EXTRE W/O DYE: CPT | Mod: RT

## 2018-09-06 PROCEDURE — 97535 SELF CARE MNGMENT TRAINING: CPT

## 2018-09-06 PROCEDURE — 700112 HCHG RX REV CODE 229: Performed by: ORTHOPAEDIC SURGERY

## 2018-09-06 PROCEDURE — A9270 NON-COVERED ITEM OR SERVICE: HCPCS | Performed by: ORTHOPAEDIC SURGERY

## 2018-09-06 PROCEDURE — 700102 HCHG RX REV CODE 250 W/ 637 OVERRIDE(OP): Performed by: HOSPITALIST

## 2018-09-06 PROCEDURE — A9270 NON-COVERED ITEM OR SERVICE: HCPCS | Performed by: FAMILY MEDICINE

## 2018-09-06 PROCEDURE — 99231 SBSQ HOSP IP/OBS SF/LOW 25: CPT | Performed by: FAMILY MEDICINE

## 2018-09-06 RX ORDER — LORAZEPAM 1 MG/1
0.5 TABLET ORAL ONCE
Status: COMPLETED | OUTPATIENT
Start: 2018-09-06 | End: 2018-09-06

## 2018-09-06 RX ADMIN — KETOROLAC TROMETHAMINE 10 MG: 10 TABLET, FILM COATED ORAL at 11:32

## 2018-09-06 RX ADMIN — DOCUSATE SODIUM 100 MG: 100 CAPSULE, LIQUID FILLED ORAL at 05:29

## 2018-09-06 RX ADMIN — ENOXAPARIN SODIUM 40 MG: 40 INJECTION SUBCUTANEOUS at 05:29

## 2018-09-06 RX ADMIN — LORAZEPAM 0.5 MG: 1 TABLET ORAL at 11:46

## 2018-09-06 RX ADMIN — ACETAMINOPHEN 650 MG: 325 TABLET, FILM COATED ORAL at 05:28

## 2018-09-06 RX ADMIN — VENLAFAXINE HYDROCHLORIDE 150 MG: 75 CAPSULE, EXTENDED RELEASE ORAL at 05:31

## 2018-09-06 RX ADMIN — GABAPENTIN 600 MG: 300 CAPSULE ORAL at 10:47

## 2018-09-06 RX ADMIN — NICOTINE 14 MG: 14 PATCH, EXTENDED RELEASE TRANSDERMAL at 05:27

## 2018-09-06 RX ADMIN — ACETAMINOPHEN 650 MG: 325 TABLET, FILM COATED ORAL at 00:08

## 2018-09-06 RX ADMIN — LAMOTRIGINE 100 MG: 100 TABLET ORAL at 05:30

## 2018-09-06 RX ADMIN — KETOROLAC TROMETHAMINE 10 MG: 10 TABLET, FILM COATED ORAL at 05:31

## 2018-09-06 RX ADMIN — GABAPENTIN 600 MG: 300 CAPSULE ORAL at 14:27

## 2018-09-06 RX ADMIN — ACETAMINOPHEN 650 MG: 325 TABLET, FILM COATED ORAL at 11:32

## 2018-09-06 RX ADMIN — TIZANIDINE 4 MG: 4 TABLET ORAL at 10:47

## 2018-09-06 ASSESSMENT — COGNITIVE AND FUNCTIONAL STATUS - GENERAL
HELP NEEDED FOR BATHING: A LITTLE
SUGGESTED CMS G CODE MODIFIER DAILY ACTIVITY: CI
DAILY ACTIVITIY SCORE: 23

## 2018-09-06 ASSESSMENT — PAIN SCALES - GENERAL
PAINLEVEL_OUTOF10: 7
PAINLEVEL_OUTOF10: 8
PAINLEVEL_OUTOF10: 6
PAINLEVEL_OUTOF10: 8
PAINLEVEL_OUTOF10: 7
PAINLEVEL_OUTOF10: 6

## 2018-09-06 NOTE — DISCHARGE INSTRUCTIONS
Discharge Instructions    Discharged to home by car with relative. Discharged via wheelchair, hospital escort: Yes.  Special equipment needed: Wheelchair    Be sure to schedule a follow-up appointment with your primary care doctor or any specialists as instructed.     Discharge Plan:   Smoking Cessation Offered: Patient Counseled  Pneumococcal Vaccine Administered/Refused: Not given - Patient refused pneumococcal vaccine  Influenza Vaccine Indication: Not indicated: Previously immunized this influenza season and > 8 years of age    I understand that a diet low in cholesterol, fat, and sodium is recommended for good health. Unless I have been given specific instructions below for another diet, I accept this instruction as my diet prescription.   Other diet: regular    Special Instructions: Discharge instructions for the Orthopedic Patient    Follow up with Primary Care Physician within 2 weeks of discharge to home, regarding:  Review of medications and diagnostic testing.  Surveillance for medical complications.  Workup and treatment of osteoporosis, if appropriate.     -Is this a Joint Replacement patient? No    -Is this patient being discharged with medication to prevent blood clots?  No    · Is patient discharged on Warfarin / Coumadin?   No         Ankle Sprain  Introduction  An ankle sprain is a stretch or tear in one of the tough tissues (ligaments) in your ankle.  Follow these instructions at home:  · Rest your ankle.  · Take over-the-counter and prescription medicines only as told by your doctor.  · For 2-3 days, keep your ankle higher than the level of your heart (elevated) as much as possible.  · If directed, put ice on the area:  ¨ Put ice in a plastic bag.  ¨ Place a towel between your skin and the bag.  ¨ Leave the ice on for 20 minutes, 2-3 times a day.  · If you were given a brace:  ¨ Wear it as told.  ¨ Take it off to shower or bathe.  ¨ Try not to move your ankle much, but wiggle your toes from time  to time. This helps to prevent swelling.  · If you were given an elastic bandage (dressing):  ¨ Take it off when you shower or bathe.  ¨ Try not to move your ankle much, but wiggle your toes from time to time. This helps to prevent swelling.  ¨ Adjust the bandage to make it more comfortable if it feels too tight.  ¨ Loosen the bandage if you lose feeling in your foot, your foot tingles, or your foot gets cold and blue.  · If you have crutches, use them as told by your doctor. Continue to use them until you can walk without feeling pain in your ankle.  Contact a doctor if:  · Your bruises or swelling are quickly getting worse.  · Your pain does not get better after you take medicine.  Get help right away if:  · You cannot feel your toes or foot.  · Your toes or your foot looks blue.  · You have very bad pain that gets worse.  This information is not intended to replace advice given to you by your health care provider. Make sure you discuss any questions you have with your health care provider.  Document Released: 06/05/2009 Document Revised: 05/25/2017 Document Reviewed: 07/19/2016  © 2017 Elsevier          Ankle Fracture  A fracture is a break in a bone. A cast or splint may be used to protect the ankle and heal the break. Sometimes, surgery is needed.  Follow these instructions at home:  · Use crutches as told by your doctor. It is very important that you use your crutches correctly.  · Do not put weight or pressure on the injured ankle until told by your doctor.  · Keep your ankle raised (elevated) when sitting or lying down.  · Apply ice to the ankle:  ¨ Put ice in a plastic bag.  ¨ Place a towel between your cast and the bag.  ¨ Leave the ice on for 20 minutes, 2-3 times a day.  · If you have a plaster or fiberglass cast:  ¨ Do not try to scratch under the cast with any objects.  ¨ Check the skin around the cast every day. You may put lotion on red or sore areas.  ¨ Keep your cast dry and clean.  · If you have a  plaster splint:  ¨ Wear the splint as told by your doctor.  ¨ You can loosen the elastic around the splint if your toes get numb, tingle, or turn cold or blue.  · Do not put pressure on any part of your cast or splint. It may break. Rest your plaster splint or cast only on a pillow the first 24 hours until it is fully hardened.  · Cover your cast or splint with a plastic bag during showers.  · Do not lower your cast or splint into water.  · Take medicine as told by your doctor.  · Do not drive until your doctor says it is safe.  · Follow-up with your doctor as told. It is very important that you go to your follow-up visits.  Contact a doctor if:  The swelling and discomfort gets worse.  Get help right away if:  · Your splint or cast breaks.  · You continue to have very bad pain.  · You have new pain or swelling after your splint or cast was put on.  · Your skin or toes below the injured ankle:  ¨ Turn blue or gray.  ¨ Feel cold, numb, or you cannot feel them.  · There is a bad smell or yellowish white fluid (pus) coming from under the splint or cast.  This information is not intended to replace advice given to you by your health care provider. Make sure you discuss any questions you have with your health care provider.  Document Released: 10/15/2010 Document Revised: 05/25/2017 Document Reviewed: 07/17/2014  [employer] Interactive Patient Education © 2017 [employer]          Depression / Suicide Risk    As you are discharged from this [facility] facility, it is important to learn how to keep safe from harming yourself.    Recognize the warning signs:  · Abrupt changes in personality, positive or negative- including increase in energy   · Giving away possessions  · Change in eating patterns- significant weight changes-  positive or negative  · Change in sleeping patterns- unable to sleep or sleeping all the time   · Unwillingness or inability to communicate  · Depression  · Unusual sadness, discouragement and  loneliness  · Talk of wanting to die  · Neglect of personal appearance   · Rebelliousness- reckless behavior  · Withdrawal from people/activities they love  · Confusion- inability to concentrate     If you or a loved one observes any of these behaviors or has concerns about self-harm, here's what you can do:  · Talk about it- your feelings and reasons for harming yourself  · Remove any means that you might use to hurt yourself (examples: pills, rope, extension cords, firearm)  · Get professional help from the community (Mental Health, Substance Abuse, psychological counseling)  · Do not be alone:Call your Safe Contact- someone whom you trust who will be there for you.  · Call your local CRISIS HOTLINE 950-4288 or 770-064-7872  · Call your local Children's Mobile Crisis Response Team [place] (217) 177-8630 or www.AskNshare  · Call the toll free National Suicide Prevention Hotlines   · National Suicide Prevention Lifeline 020-264-MDJW (9154)  · National Hope Line Network 800-SUICIDE (322-7106)

## 2018-09-06 NOTE — DISCHARGE PLANNING
Received Choice form at 8427 9/5  Agency/Facility Name: [facility]  Referral sent per Choice form @ [phone]

## 2018-09-06 NOTE — DISCHARGE SUMMARY
Discharge Summary    CHIEF COMPLAINT ON ADMISSION  Chief Complaint   Patient presents with   • Ankle Injury     bilateral ankle pain/swelling. pt states she rolled her ankles when coming down off a narrow step    pt's nurse spoke to the ortho PA about the MRI of the right ankel and no intervention was recommended.    9/6/18  Pt complained of more pain her sprained right ankel and she was kept to have mri of the ankel to rule out any fractures that were not detected on the ankel xray.i also d/w the  and I was informed that the pt does not want the wheel chair and wants knee scooter  And the face to face and dme was done for that.if the mri of the ankel  Is negative and knee scooter is available then she will be discharged home.  She is seen at the bed side with her nurse this morning.    Reason for Admission  Ankle pain     Admission Date  9/2/2018    CODE STATUS  Full Code    HPI & HOSPITAL COURSE  This is a 38 y.o. female here with  past history of fibromyalgia who tripped and fell .  She she injured both ankles.  Both ankles became swollen and painful.  The left ankle worse than the right.  She is evaluated emergency room she has a right ankle sprain left ankle fracture .patient go to the OR today.  Left ankle pain , throbbing ,intensity is 8 out of 10 ,constant, worse with movement.  She was admitted with Closed fracture of left ankle with nonunion.  She was seen by ortho and was taken to the O.R and had ORIF left ankle.  She was seen by pt and ot and recommended home with home health  And she was cleared by ortho for discharge.she stated that she had to push to urinate,however her UA does not show infection and her bladder scan showed 94cc of urine.i have spoken to the pt extensively with the nurses in the room and that there is no medical reason to keep her in the hospital.however she has been persistently aske to stay longer.today I along with nurse hospitalist and the pt's  Nurse d/w the pt that  she has no medical reasons to stay in the hospital and she can f/u with pcp for fibromyalgia and the pain clinic.       Therefore, she is discharged in good and stable condition to home with close outpatient follow-up.    The patient met 2-midnight criteria for an inpatient stay at the time of discharge.    Discharge Date  9/5/18    FOLLOW UP ITEMS POST DISCHARGE  pcp this week or next week  Ortho in 10 to 14 days    DISCHARGE DIAGNOSES  Principal Problem:    Closed fracture of left ankle with nonunion POA: Yes  Active Problems:    Fibromyalgia POA: Yes    Recurrent major depressive disorder, in partial remission (HCC) POA: Yes    Psychophysiological insomnia POA: Yes  Resolved Problems:    * No resolved hospital problems. *      FOLLOW UP  Future Appointments  Date Time Provider Department Center   11/6/2018 10:00 AM Melissa P Bloch, M.D. [facility] None     Dayday Jaramillo M.D.  [address]  [place] [address]  [phone]    Call in 2 days  for recheck, As needed, If symptoms worsen    [facility]  [address] [facility] [phone]          MEDICATIONS ON DISCHARGE     Medication List      START taking these medications      Instructions   HYDROcodone-acetaminophen 5-325 MG Tabs per tablet  Commonly known as:  NORCO   Take 1 Tab by mouth every 8 hours as needed for up to 3 days.  Dose:  1 Tab     ketorolac 10 MG Tabs  Commonly known as:  TORADOL   Take 1 Tab by mouth every 6 hours as needed for Moderate Pain.  Dose:  10 mg        CONTINUE taking these medications      Instructions   acetaminophen 325 MG Tabs  Commonly known as:  TYLENOL   Take 650 mg by mouth every four hours as needed for Moderate Pain.  Dose:  650 mg     EFFEXOR  MG extended-release capsule  Generic drug:  venlafaxine   Take 150 mg by mouth every day.  Dose:  150 mg     gabapentin 300 MG Caps  Commonly known as:  NEURONTIN   Take 600 mg by mouth 4 times a day. Per pt recently changed to 600 four times a  "day  Dose:  600 mg     lamoTRIgine 100 MG Tabs  Commonly known as:  LAMICTAL   Take 100 mg by mouth every day.  Dose:  100 mg     tizanidine 4 MG Tabs  Commonly known as:  ZANAFLEX   Take 4 mg by mouth 1 time daily as needed.  Dose:  4 mg     traZODone 150 MG Tabs  Commonly known as:  DESYREL   Take 150 mg by mouth every evening.  Dose:  150 mg        STOP taking these medications    ibuprofen 200 MG Tabs  Commonly known as:  MOTRIN            Allergies  Allergies   Allergen Reactions   • Latex Rash   • Sulfa Drugs Unspecified     \"Muscle spasm\".       • Codeine Unspecified     \"Childhood allergie, not sure what happens\".     • Morphine Itching       DIET  Orders Placed This Encounter   Procedures   • Diet Order Regular     Standing Status:   Standing     Number of Occurrences:   1     Order Specific Question:   Diet:     Answer:   Regular [1]     Order Specific Question:   Miscellaneous modifications:     Answer:   Lactose Free per PT [7]       ACTIVITY  As tolerated.  Weight bearing as tolerated as per ortho's  recommendations    CONSULTATIONS  ortho    PROCEDURES  ORIF left ankle    LABORATORY  Lab Results   Component Value Date    SODIUM 141 09/05/2018    POTASSIUM 3.6 09/05/2018    CHLORIDE 108 09/05/2018    CO2 26 09/05/2018    GLUCOSE 99 09/05/2018    BUN 8 09/05/2018    CREATININE 0.62 09/05/2018    CREATININE 0.9 08/30/2008        Lab Results   Component Value Date    WBC 4.4 (L) 09/05/2018    HEMOGLOBIN 11.3 (L) 09/05/2018    HEMATOCRIT 34.6 (L) 09/05/2018    PLATELETCT 222 09/05/2018        Total time of the discharge process exceeds 35 minutes.  "

## 2018-09-06 NOTE — CARE PLAN
Problem: Safety  Goal: Will remain free from injury  Outcome: PROGRESSING AS EXPECTED  Safety precautions in place. Call light within reach. Bed is low and in locked position. Hourly rounding in place.     Problem: Discharge Barriers/Planning  Goal: Patient's continuum of care needs will be met  Outcome: PROGRESSING AS EXPECTED  Received d/c orders  Pending wheelchair

## 2018-09-06 NOTE — PROGRESS NOTES
Pt is missing her wheelchair. Pt states that she had it yesterday when she was in T316 bed 1. This RN checked T308 bed 1 and 2 and the bathroom. T316 bed 1 and 2 and the bathroom was also checked. Did not see pt wheelchair. RN notified charge RN and SW. SW called [facility] to check if they came and picked it up. Per SW, [facility] did not get the wheelchair back. Notified charge ELBERT Paulino again.

## 2018-09-06 NOTE — DISCHARGE PLANNING
Agency/Facility Name: [facility]  Spoke To: Desiree  Outcome: Knee walker requires auth and they will need updated chart notes. Faxed updated chart notes to [facility].

## 2018-09-06 NOTE — THERAPY
"Occupational Therapy Treatment completed with focus on ADLs, ADL transfers and patient education.  Functional Status:  Pt seen for OT tx today.  Pt was pleasant and cooperative during the session.  Continues to be limited by endurance, pain in B ankles, transfers, and self care.  Pt completed LB dressing with supervision, UB dressing I, seated shower with supervision, and standing gr/hy with supervision using FWW for support.  Needing no cues to initiate, follow through, and problem solve during ADL tasks.  Pt completed supine to sit mod I, sit to stand with supervision, and room ambulation using FWW from bed to toilet with supervision.  Pt would benefit from continued home health OT as they are continuing to need assistance with IADLs.      Plan of Care: Will benefit from Occupational Therapy 3 times per week  Discharge Recommendations:  Equipment Will Continue to Assess for Equipment Needs.     See \"Rehab Therapy-Acute\" Patient Summary Report for complete documentation.   "

## 2018-09-06 NOTE — CARE PLAN
Problem: Mobility  Goal: Risk for activity intolerance will decrease  Patient is doing well with front wheeled walker.  Patient is able to maintain toe touch weight bearing and has a steady gait.

## 2018-09-06 NOTE — PROGRESS NOTES
Johanny Morris . Will need DME order updated for pt to have WC switched for knee scooter. Pt. Has apparently called [facility] and they told her they would be able to switch it out today if the order is updated,

## 2018-09-06 NOTE — DISCHARGE PLANNING
"Anticipated Discharge Disposition: Home with HHC    Action: Spoke to bedside RN and discussed pt's case. Apparently a WC was ordered and delivered but pt states this will not work for her and fit in her home and MD discussed ordering a knee scooter instead for pt. Reviewed pt's chart and met with pt at bedside. Pt discussed with LSW that she does not feel ready for d/c today, pt states her right ankle is very swollen and painful. Pt also stated that she wants to be off of \"IV pain medications for at least 24 hours\". Pt states she wants to be sure her pain can be controlled with PO pain medications prior to going home. Discussed WC that was delivered and per pt [facility] is going to \"swap out WC for a knee scooter\". Attempted to discuss HHC services with pt and she states \"I want to review the agencies online first before signing\". Pt did mention she has had [facility] in the past.     [facility] f/u with [facility] and they are awaiting an order from MD for knee scooter. Called attending MD and discussed above. Updated attending MD on pt's concerns and need for new DME order for knee scooter and face to face. MD will place orders.     Attempted several times to meet with pt to obtain HHC choice and pt was refusing to make a choice stating she wanted to continue to research agencies. LSW made one last attempt to meet with pt and she agreed to HHC services and signed choice form for [facility]. Pt confirmed her PCP is Dr. Knutsno. Faxed choice form to [facility]. Updated bedside RN.    Barriers to Discharge: N/A.     Plan: As above, pending HHC acceptance. Awaiting knee scooter DME order and face to face. Left report for unit LSW.     "

## 2018-09-06 NOTE — PROGRESS NOTES
Spoke to Rodrigo - Ortho PA regarding MRI of right ankle results. Per Rodrigo, no interventions are needed at this time. Pt has a brace for her right ankle. Notified Dr. Morris.

## 2018-09-06 NOTE — PROGRESS NOTES
Pt. discharged to home. Pt left unit via WC with escort.   IV pulled out.   Pt states she has a walker at home  Wheelchair with the pt upon leaving  Notified pt that [facility] will call her regarding knee scooter.   Reviewed discharge instructions, follow up appointments, and prescription with the patient. Patient states understanding of all the instructions. Discharge instructions, prescriptions, and personal belongings with patient upon leaving.

## 2018-09-06 NOTE — CARE PLAN
Problem: Safety  Goal: Will remain free from falls  Patient is compliant with use of call light and waits for staff assistance.  Possessions are within reach.  Frequent rounding.

## 2018-09-06 NOTE — FACE TO FACE
Face to Face Note  -  Durable Medical Equipment    Gallo Morris M.D. - NPI: [id number]  I certify that this patient is under my care and that they had a durable medical equipment(DME)face to face encounter by myself that meets the physician DME face-to-face encounter requirements with this patient on:    Date of encounter:   Patient:                    MRN:                       YOB: 2017  Florencia Lau  [id number]  1980     The encounter with the patient was in whole, or in part, for the following medical condition, which is the primary reason for durable medical equipment:  Post-Op Surgery    I certify that, based on my findings, the following durable medical equipment is medically necessary:  Other DME Equipment - knee scooter.    HOME O2 Saturation Measurements:(Values must be present for Home Oxygen orders)         ,     ,         My Clinical findings support the need for the above equipment due to:  Other - left leg fracture    Supporting Symptoms: pain

## 2018-09-06 NOTE — PROGRESS NOTES
PT HAD A SEVERE PANIC ATTACK , SHE IS SAYING SHE THINKS SHE IS WITHDRAWING FROM THE IV DILAUDID , WANTS TO GET IT REORDERED. IN TEARS, MAKING HERSELF BREATHE LOUDLY/SHAKING HER BODY BACK AND FORTH WHILE TALKING TO ME AND SAYING SHE IS SOB. HOWEVER, ON AUSCULTATION BREATH SOUNDS ARE CLEAR, AND O2 IS AT 96% ON RA. ALSO SAYS SHE IS NAUSEAS.     EDUCATED HER ON HOW OPIOID WITHDRAWAL OCCURS, SHE IS STILL GETTING PO OXY AND HER LAST DOSE OF DILAUDID WAS JUST THIS MORNING.....    GAVE HER PRN TORADOL FOR PAIN, HALDOL FOR NAUSEA, AND TIZANIDIN FOR MUSCLE SPASMS. SHE ROLLED OVER IMMEDIATELY AFTER TO REST. VSS, BED ALARM AND HOURLY ROUNDING IN PLACE.

## 2018-09-06 NOTE — DISCHARGE PLANNING
Agency/Facility Name: [facility]  Spoke To: Gena  Outcome: patient is discharging home, please contact patient regarding knee scooter.

## 2018-10-04 ENCOUNTER — OFFICE VISIT (OUTPATIENT)
Dept: URGENT CARE | Facility: CLINIC | Age: 38
End: 2018-10-04
Payer: COMMERCIAL

## 2018-10-04 VITALS
OXYGEN SATURATION: 97 % | SYSTOLIC BLOOD PRESSURE: 110 MMHG | HEIGHT: 67 IN | HEART RATE: 80 BPM | WEIGHT: 171.8 LBS | RESPIRATION RATE: 18 BRPM | TEMPERATURE: 98.6 F | BODY MASS INDEX: 26.97 KG/M2 | DIASTOLIC BLOOD PRESSURE: 72 MMHG

## 2018-10-04 DIAGNOSIS — R09.81 SINUS CONGESTION: ICD-10-CM

## 2018-10-04 DIAGNOSIS — M79.7 FIBROMYALGIA: ICD-10-CM

## 2018-10-04 DIAGNOSIS — J02.9 PHARYNGITIS, UNSPECIFIED ETIOLOGY: ICD-10-CM

## 2018-10-04 LAB
INT CON NEG: NORMAL
INT CON POS: NORMAL
S PYO AG THROAT QL: NEGATIVE

## 2018-10-04 PROCEDURE — 87880 STREP A ASSAY W/OPTIC: CPT | Performed by: PHYSICIAN ASSISTANT

## 2018-10-04 PROCEDURE — 99214 OFFICE O/P EST MOD 30 MIN: CPT | Performed by: PHYSICIAN ASSISTANT

## 2018-10-04 RX ORDER — AMOXICILLIN AND CLAVULANATE POTASSIUM 875; 125 MG/1; MG/1
1 TABLET, FILM COATED ORAL 2 TIMES DAILY
Qty: 20 TAB | Refills: 0 | Status: SHIPPED | OUTPATIENT
Start: 2018-10-04 | End: 2018-10-14

## 2018-10-04 RX ORDER — KETOROLAC TROMETHAMINE 30 MG/ML
60 INJECTION, SOLUTION INTRAMUSCULAR; INTRAVENOUS ONCE
Status: COMPLETED | OUTPATIENT
Start: 2018-10-04 | End: 2018-10-04

## 2018-10-04 RX ADMIN — KETOROLAC TROMETHAMINE 60 MG: 30 INJECTION, SOLUTION INTRAMUSCULAR; INTRAVENOUS at 16:43

## 2018-10-04 ASSESSMENT — ENCOUNTER SYMPTOMS
SPUTUM PRODUCTION: 1
VOMITING: 0
SHORTNESS OF BREATH: 0
ABDOMINAL PAIN: 0
DIARRHEA: 0
FEVER: 0
SORE THROAT: 1
SINUS PAIN: 0
WHEEZING: 0
NAUSEA: 0
CHILLS: 0
COUGH: 1

## 2018-10-04 NOTE — PROGRESS NOTES
"  Subjective:     Florencia Lau is a 38 y.o. female who presents for Pharyngitis (x 2 days / cough)       Pharyngitis    This is a new problem. The current episode started yesterday. Associated symptoms include congestion, coughing and ear pain. Pertinent negatives include no abdominal pain, diarrhea, shortness of breath or vomiting.   notes last two day of ST, PMH of single strep, daughter w/ strep now  - last dx was on Monday, subj fever, runny nose, mild cough, c/o nauea, denies emesis, used mucinex this am, c/o chest congestion, denies abd pain/diarrhea/rash, tried cough drops, denies PMH of asthma/pneumonia, PMH of bronchitis, does have seasonal allerg - takes flonase/antihistamine - but hasn't been lately - feels \"like I might be getting a sinus infection\".     Additionally patient has a long past medical history of fibromyalgia and generalized pain associated.  She requests a repeat of her periodic Toradol injections for general pain.  She states the last was given in the ER approximately 6-8 weeks ago.    Past Medical History:   Diagnosis Date   • Anxiety    • Anxiety and depression 06/12/2018   • Back pain    • Bowel habit changes 06/12/2018    \"Constipation/Diarrhea\"   • Bronchitis     HX OF   • Depression    • Fibromyalgia    • Hayfever 06/12/2018   • Indigestion 06/12/2018   • Insomnia    • Muscle spasm 06/12/2018    \"My neurologist told me I have a muscle spasm disorder\"   • Neck pain    • Panic attacks    • Shoulder pain    • Stress incontinence 06/12/2018   • TMJ syndrome      Past Surgical History:   Procedure Laterality Date   • ANKLE ORIF Left 9/2/2018    Procedure: ANKLE ORIF;  Surgeon: Dayday Jaramillo M.D.;  Location: SURGERY [facility];  Service: Orthopedics   • BLADDER SUSPENSION  6/27/2018    Procedure: BLADDER SUSPENSION-   FOR: TRANS OBTURATOR TAPE;  Surgeon: Chapin Banks M.D.;  Location: SURGERY SAME DAY [facility];  Service: Gynecology   • OTHER      neck abscess   • " "TONSILLECTOMY     • TUBAL LIGATION       Social History     Social History   • Marital status: Single     Spouse name: N/A   • Number of children: N/A   • Years of education: N/A     Occupational History   • Not on file.     Social History Main Topics   • Smoking status: Current Every Day Smoker     Packs/day: 0.50     Types: Cigarettes   • Smokeless tobacco: Never Used      Comment: 1 PK per day   • Alcohol use No   • Drug use: No   • Sexual activity: Not on file     Other Topics Concern   • Not on file     Social History Narrative   • No narrative on file      Family History   Problem Relation Age of Onset   • Diabetes Unknown    • Allergies Unknown         RA   • Other Unknown         DIVERTICULITIS, LUPUS, DDD, FM    Review of Systems   Constitutional: Negative for chills and fever.        C/o gen pain w/ fibromyalgia   HENT: Positive for congestion, ear pain and sore throat. Negative for sinus pain.    Respiratory: Positive for cough and sputum production. Negative for shortness of breath and wheezing.    Gastrointestinal: Negative for abdominal pain, diarrhea, nausea and vomiting.   Skin: Negative for rash.   Endo/Heme/Allergies: Positive for environmental allergies ( no tx).     Allergies   Allergen Reactions   • Latex Rash   • Sulfa Drugs Unspecified     \"Muscle spasm\".       • Codeine Unspecified     \"Childhood allergie, not sure what happens\".     • Morphine Itching   I have worn a mask for the entire encounter with this patient.    Objective:   /72 (BP Location: Left arm, Patient Position: Sitting, BP Cuff Size: Large adult)   Pulse 80   Temp 37 °C (98.6 °F)   Resp 18   Ht 1.702 m (5' 7\")   Wt 77.9 kg (171 lb 12.8 oz)   SpO2 97%   BMI 26.91 kg/m²   Physical Exam   Constitutional: She is oriented to person, place, and time. She appears well-developed and well-nourished. No distress.   HENT:   Head: Normocephalic and atraumatic.   Right Ear: Tympanic membrane, external ear and ear canal normal. "   Left Ear: Tympanic membrane, external ear and ear canal normal.   Nose: Right sinus exhibits maxillary sinus tenderness. Right sinus exhibits no frontal sinus tenderness. Left sinus exhibits maxillary sinus tenderness. Left sinus exhibits no frontal sinus tenderness.   Mouth/Throat: Uvula is midline and mucous membranes are normal. Posterior oropharyngeal erythema ( mild PND) present. No oropharyngeal exudate, posterior oropharyngeal edema or tonsillar abscesses.   Eyes: Conjunctivae and lids are normal. Right eye exhibits no discharge. Left eye exhibits no discharge. No scleral icterus.   Neck: Neck supple.   Pulmonary/Chest: Effort normal. No respiratory distress. She has no decreased breath sounds. She has no wheezes. She has no rhonchi. She has no rales.   Musculoskeletal: Normal range of motion.   Lymphadenopathy:     She has cervical adenopathy ( mild bilat).   Neurological: She is alert and oriented to person, place, and time. She is not disoriented.   Skin: Skin is warm and dry. She is not diaphoretic. No erythema. No pallor.   Psychiatric: Her speech is normal and behavior is normal.   Nursing note and vitals reviewed.  POCT strep - NEG  toradol - tolerates well      Assessment/Plan:   Assessment    1. Pharyngitis, unspecified etiology  - lidocaine viscous 2% (XYLOCAINE) 2 % Solution; Take 5 mL by mouth as needed for Throat/Mouth Pain (q6hr PRN throat pain, ok to rinse and spit or swallow).  Dispense: 120 mL; Refill: 0  - POCT Rapid Strep A    2. Fibromyalgia  - ketorolac (TORADOL) injection 60 mg; 2 mL by Intramuscular route Once.    3. Sinus congestion  - amoxicillin-clavulanate (AUGMENTIN) 875-125 MG Tab; Take 1 Tab by mouth 2 times a day for 10 days.  Dispense: 20 Tab; Refill: 0    Supportive care is reviewed with patient/caregiver - recommend to push PO fluids and electrolytes, Nsaids/tylenol, netti pot/saline irrig, humidifier in home, flonase, ponaris, antihistamine, viscous lido -I reviewed with  patient unlikely sinus infection at this time and negative strep no indication for antibiotics.  Despite this she would like a sinus covering antibiotic today she states he usually takes her multiple doses of very high strength antibiotics.    Contingent antibiotic prescription given to patient to fill upon meeting criteria of guidelines discussed.    take full course of Rx, take with probiotics, observe for resolution  Return to clinic with lack of resolution or progression of symptoms.    Differential diagnosis, natural history, supportive care, and indications for immediate follow-up discussed.

## 2018-10-06 ENCOUNTER — HOSPITAL ENCOUNTER (OUTPATIENT)
Dept: RADIOLOGY | Facility: MEDICAL CENTER | Age: 38
End: 2018-10-06
Attending: ORTHOPAEDIC SURGERY
Payer: COMMERCIAL

## 2018-10-06 DIAGNOSIS — S93.491A SPRAIN OF ANTERIOR TALOFIBULAR LIGAMENT OF RIGHT ANKLE, INITIAL ENCOUNTER: ICD-10-CM

## 2018-10-06 PROCEDURE — 73721 MRI JNT OF LWR EXTRE W/O DYE: CPT | Mod: RT

## 2018-10-16 ENCOUNTER — HOSPITAL ENCOUNTER (OUTPATIENT)
Facility: MEDICAL CENTER | Age: 38
End: 2018-10-16
Attending: PHYSICIAN ASSISTANT
Payer: COMMERCIAL

## 2018-10-16 ENCOUNTER — OFFICE VISIT (OUTPATIENT)
Dept: URGENT CARE | Facility: CLINIC | Age: 38
End: 2018-10-16
Payer: COMMERCIAL

## 2018-10-16 VITALS
TEMPERATURE: 98.1 F | OXYGEN SATURATION: 96 % | BODY MASS INDEX: 26.74 KG/M2 | RESPIRATION RATE: 14 BRPM | SYSTOLIC BLOOD PRESSURE: 120 MMHG | WEIGHT: 170.4 LBS | HEIGHT: 67 IN | DIASTOLIC BLOOD PRESSURE: 90 MMHG | HEART RATE: 84 BPM

## 2018-10-16 DIAGNOSIS — N89.8 VAGINAL DISCHARGE: ICD-10-CM

## 2018-10-16 PROCEDURE — 87660 TRICHOMONAS VAGIN DIR PROBE: CPT

## 2018-10-16 PROCEDURE — 87591 N.GONORRHOEAE DNA AMP PROB: CPT

## 2018-10-16 PROCEDURE — 87480 CANDIDA DNA DIR PROBE: CPT

## 2018-10-16 PROCEDURE — 99214 OFFICE O/P EST MOD 30 MIN: CPT | Performed by: PHYSICIAN ASSISTANT

## 2018-10-16 PROCEDURE — 87491 CHLMYD TRACH DNA AMP PROBE: CPT

## 2018-10-16 PROCEDURE — 87510 GARDNER VAG DNA DIR PROBE: CPT

## 2018-10-17 DIAGNOSIS — N89.8 VAGINAL DISCHARGE: ICD-10-CM

## 2018-10-17 LAB
C TRACH DNA SPEC QL NAA+PROBE: NEGATIVE
CANDIDA DNA VAG QL PROBE+SIG AMP: NEGATIVE
G VAGINALIS DNA VAG QL PROBE+SIG AMP: POSITIVE
N GONORRHOEA DNA SPEC QL NAA+PROBE: NEGATIVE
SPECIMEN SOURCE: NORMAL
T VAGINALIS DNA VAG QL PROBE+SIG AMP: NEGATIVE

## 2018-10-18 DIAGNOSIS — N76.0 ACUTE VAGINITIS: ICD-10-CM

## 2018-10-18 RX ORDER — METRONIDAZOLE 500 MG/1
500 TABLET ORAL 2 TIMES DAILY
Qty: 14 TAB | Refills: 0 | Status: SHIPPED | OUTPATIENT
Start: 2018-10-18 | End: 2018-10-25

## 2018-10-19 ASSESSMENT — ENCOUNTER SYMPTOMS
FLANK PAIN: 0
CONSTIPATION: 0
SHORTNESS OF BREATH: 0
WHEEZING: 0
EYE DISCHARGE: 0
CHILLS: 0
SORE THROAT: 0
FEVER: 0
ABDOMINAL PAIN: 0
EYE REDNESS: 0
DIARRHEA: 0
FALLS: 0
VAGINITIS: 1

## 2018-10-19 NOTE — PROGRESS NOTES
"Subjective:      Florencia Lau is a 38 y.o. female who presents with Vaginal Discharge (x4 days)            Patient is a 38-year-old female who presents today with vaginal discharge for the last 3-4 days.  Patient does mention that she has history of vaginal discharge anyway in the past however she currently is with a new partner and would like to be \"tested for everything \".  Patient denies any vaginal itching or any risk of pregnancy as patient is status post tubal ligation.  Patient denies prior history of STI in the past.  She denies any dysuria, urinary urgency or frequency, abdominal pain or back pain.      Vaginitis   The patient's primary symptoms include vaginal discharge. The patient's pertinent negatives include no genital itching, genital lesions, genital odor, pelvic pain or vaginal bleeding. This is a new problem. The current episode started in the past 7 days. The problem occurs constantly. The problem has been gradually worsening. The pain is mild. She is not pregnant. Pertinent negatives include no abdominal pain, chills, constipation, diarrhea, dysuria, fever, flank pain, frequency, painful intercourse, sore throat or urgency. The vaginal discharge was watery. There has been no bleeding. She has not been passing clots. She has not been passing tissue. Nothing aggravates the symptoms. She has tried nothing for the symptoms. She is sexually active. No, her partner does not have an STD. She uses tubal ligation for contraception.       Review of Systems   Constitutional: Negative for chills and fever.   HENT: Negative for sore throat.    Eyes: Negative for discharge and redness.   Respiratory: Negative for shortness of breath and wheezing.    Gastrointestinal: Negative for abdominal pain, constipation and diarrhea.   Genitourinary: Positive for vaginal discharge. Negative for dysuria, flank pain, frequency, pelvic pain and urgency.   Musculoskeletal: Negative for falls.   All other systems " "reviewed and are negative.         Objective:     /90 (BP Location: Left arm, Patient Position: Sitting, BP Cuff Size: Adult)   Pulse 84   Temp 36.7 °C (98.1 °F)   Resp 14   Ht 1.702 m (5' 7\")   Wt 77.3 kg (170 lb 6.4 oz)   SpO2 96%   BMI 26.69 kg/m²    PMH:  has a past medical history of Anxiety; Anxiety and depression (06/12/2018); Back pain; Bowel habit changes (06/12/2018); Bronchitis; Depression; Fibromyalgia; Hayfever (06/12/2018); Indigestion (06/12/2018); Insomnia; Muscle spasm (06/12/2018); Neck pain; Panic attacks; Shoulder pain; Stress incontinence (06/12/2018); and TMJ syndrome.  MEDS:   Current Outpatient Prescriptions:   •  metroNIDAZOLE (FLAGYL) 500 MG Tab, Take 1 Tab by mouth 2 Times a Day for 7 days. Must avoid Alcohol consumption while on medication., Disp: 14 Tab, Rfl: 0  •  lidocaine viscous 2% (XYLOCAINE) 2 % Solution, Take 5 mL by mouth as needed for Throat/Mouth Pain (q6hr PRN throat pain, ok to rinse and spit or swallow)., Disp: 120 mL, Rfl: 0  •  ketorolac (TORADOL) 10 MG Tab, Take 1 Tab by mouth every 6 hours as needed for Moderate Pain., Disp: 15 Tab, Rfl: 0  •  tizanidine (ZANAFLEX) 4 MG Tab, Take 4 mg by mouth 1 time daily as needed., Disp: , Rfl:   •  traZODone (DESYREL) 150 MG Tab, Take 150 mg by mouth every evening., Disp: , Rfl:   •  venlafaxine (EFFEXOR XR) 150 MG extended-release capsule, Take 150 mg by mouth every day., Disp: , Rfl:   •  acetaminophen (TYLENOL) 325 MG Tab, Take 650 mg by mouth every four hours as needed for Moderate Pain., Disp: , Rfl:   •  gabapentin (NEURONTIN) 300 MG Cap, Take 600 mg by mouth 4 times a day. Per pt recently changed to 600 four times a day, Disp: , Rfl:   •  lamoTRIgine (LAMICTAL) 100 MG Tab, Take 100 mg by mouth every day., Disp: , Rfl:   ALLERGIES:   Allergies   Allergen Reactions   • Latex Rash   • Sulfa Drugs Unspecified     \"Muscle spasm\".       • Codeine Unspecified     \"Childhood allergie, not sure what happens\".     • " Morphine Itching     SURGHX:   Past Surgical History:   Procedure Laterality Date   • ANKLE ORIF Left 9/2/2018    Procedure: ANKLE ORIF;  Surgeon: Dayday Jaramillo M.D.;  Location: SURGERY [facility];  Service: Orthopedics   • BLADDER SUSPENSION  6/27/2018    Procedure: BLADDER SUSPENSION-   FOR: TRANS OBTURATOR TAPE;  Surgeon: Chapin Banks M.D.;  Location: SURGERY [facility] DAY [facility];  Service: Gynecology   • OTHER      neck abscess   • TONSILLECTOMY     • TUBAL LIGATION       SOCHX:  reports that she has been smoking Cigarettes.  She has been smoking about 0.50 packs per day. She has never used smokeless tobacco. She reports that she does not drink alcohol or use drugs.  FH: Family history was reviewed, no pertinent findings to report    Physical Exam   Constitutional: She is oriented to person, place, and time. She appears well-developed and well-nourished. No distress.   HENT:   Head: Normocephalic and atraumatic.   Eyes: Pupils are equal, round, and reactive to light. Conjunctivae and EOM are normal.   Neck: Normal range of motion. Neck supple. No tracheal deviation present.   Cardiovascular: Normal rate.    Pulmonary/Chest: Effort normal.   Abdominal: Soft. Bowel sounds are normal. She exhibits no distension.   Genitourinary: Uterus normal. There is no rash or tenderness on the right labia. There is no rash or tenderness on the left labia. Cervix exhibits no motion tenderness and no friability. No bleeding in the vagina. Vaginal discharge found.   Neurological: She is alert and oriented to person, place, and time.   Skin: Skin is warm. No rash noted.   Psychiatric: She has a normal mood and affect. Her behavior is normal. Judgment and thought content normal.   Vitals reviewed.              Assessment/Plan:     1. Vaginal discharge  - CHLAMYDIA/GC PCR URINE OR SWAB; Future  - VAGINAL PATHOGENS DNA PANEL; Future    We will send off for cultures at this time however patient cervix is without  irritation, friability are noted inflammation.  I will hold on any further treatment until results return.  Encourage the patient to abstain from sexual encounters until such results return.  Patient given precautionary s/sx that mandate immediate follow up and evaluation in the ED. Advised of risks of not doing so.    DDX, Supportive care, and indications for immediate follow-up discussed with patient.    Instructed to return to clinic or nearest emergency department if we are not available for any change in condition, further concerns, or worsening of symptoms.    The patient demonstrated a good understanding and agreed with the treatment plan.  Please note that this dictation was created using voice recognition software. I have made every reasonable attempt to correct obvious errors, but I expect that there are errors of grammar and possibly content that I did not discover before finalizing the note.    10/19- Spoke with the patient regarding her results- pos. For BV-metronidazole was sent to patient's pharmacy-  Patient is to avoid alcohol consumption while on the medication.  All questions answered at this time.

## 2018-11-06 ENCOUNTER — APPOINTMENT (OUTPATIENT)
Dept: NEUROLOGY | Facility: MEDICAL CENTER | Age: 38
End: 2018-11-06
Payer: COMMERCIAL

## 2018-11-09 ENCOUNTER — HOSPITAL ENCOUNTER (OUTPATIENT)
Facility: MEDICAL CENTER | Age: 38
End: 2018-11-09
Payer: COMMERCIAL

## 2018-11-09 LAB
BDY FAT % MEASURED: 31.4 %
BP DIAS: 80 MMHG
BP SYS: 112 MMHG
CHOLEST SERPL-MCNC: 244 MG/DL (ref 100–199)
DIABETES HTDIA: NO
EVENT NAME HTEVT: NORMAL
FASTING STATUS PATIENT QL REPORTED: NORMAL
GLUCOSE SERPL-MCNC: 104 MG/DL (ref 65–99)
HDLC SERPL-MCNC: 39 MG/DL
HYPERTENSION HTHYP: NO
LDLC SERPL CALC-MCNC: 170 MG/DL
SCREENING LOC CITY HTCIT: NORMAL
SCREENING LOC STATE HTSTA: NORMAL
SCREENING LOCATION HTLOC: NORMAL
SUBSCRIBER ID HTSID: NORMAL
TRIGL SERPL-MCNC: 177 MG/DL (ref 0–149)

## 2018-11-12 ENCOUNTER — OFFICE VISIT (OUTPATIENT)
Dept: URGENT CARE | Facility: CLINIC | Age: 38
End: 2018-11-12
Payer: COMMERCIAL

## 2018-11-12 VITALS
SYSTOLIC BLOOD PRESSURE: 110 MMHG | DIASTOLIC BLOOD PRESSURE: 80 MMHG | WEIGHT: 160 LBS | HEART RATE: 99 BPM | BODY MASS INDEX: 25.11 KG/M2 | OXYGEN SATURATION: 97 % | HEIGHT: 67 IN | TEMPERATURE: 97.8 F | RESPIRATION RATE: 16 BRPM

## 2018-11-12 DIAGNOSIS — M53.3 SACRO ILIAL PAIN: ICD-10-CM

## 2018-11-12 PROCEDURE — 99214 OFFICE O/P EST MOD 30 MIN: CPT | Performed by: NURSE PRACTITIONER

## 2018-11-12 RX ORDER — IBUPROFEN 800 MG/1
800 TABLET ORAL EVERY 8 HOURS PRN
COMMUNITY
End: 2019-03-16 | Stop reason: CLARIF

## 2018-11-12 RX ORDER — KETOROLAC TROMETHAMINE 30 MG/ML
60 INJECTION, SOLUTION INTRAMUSCULAR; INTRAVENOUS ONCE
Status: COMPLETED | OUTPATIENT
Start: 2018-11-12 | End: 2018-11-12

## 2018-11-12 RX ADMIN — KETOROLAC TROMETHAMINE 60 MG: 30 INJECTION, SOLUTION INTRAMUSCULAR; INTRAVENOUS at 15:08

## 2018-11-13 ASSESSMENT — ENCOUNTER SYMPTOMS
SENSORY CHANGE: 1
FOCAL WEAKNESS: 0
ABDOMINAL PAIN: 0
WEAKNESS: 0
TINGLING: 1
CHILLS: 0
FEVER: 0
BACK PAIN: 1

## 2018-11-13 NOTE — PROGRESS NOTES
Subjective:      Florencia Lau is a 38 y.o. female who presents with Hip Pain (taking ib profen 800mg not helping, radiating down the leg, hot and numb, hard to sleep burning sensation, started after brake of ankle on left)            HPI  New problem. 38 year old female with left sided hip/sacroiliac pain for several days that has worsened. She has had ankle surgery in past month and feels that her altered gait is responsible for this. She states the pain is shooting, burning and severe making it difficult for her to sleep. She denies bowel or bladder issues or pelvic paresthesia. She has been taking ibuprofen with little relief, last dose last night. Requesting referral and shot of toradol.  Latex; Sulfa drugs; Codeine; and Morphine  Current Outpatient Prescriptions on File Prior to Visit   Medication Sig Dispense Refill   • tizanidine (ZANAFLEX) 4 MG Tab Take 4 mg by mouth 1 time daily as needed.     • traZODone (DESYREL) 150 MG Tab Take 150 mg by mouth every evening.     • venlafaxine (EFFEXOR XR) 150 MG extended-release capsule Take 150 mg by mouth every day.     • lamoTRIgine (LAMICTAL) 100 MG Tab Take 100 mg by mouth every day.     • lidocaine viscous 2% (XYLOCAINE) 2 % Solution Take 5 mL by mouth as needed for Throat/Mouth Pain (q6hr PRN throat pain, ok to rinse and spit or swallow). 120 mL 0   • ketorolac (TORADOL) 10 MG Tab Take 1 Tab by mouth every 6 hours as needed for Moderate Pain. 15 Tab 0   • acetaminophen (TYLENOL) 325 MG Tab Take 650 mg by mouth every four hours as needed for Moderate Pain.     • gabapentin (NEURONTIN) 300 MG Cap Take 600 mg by mouth 4 times a day. Per pt recently changed to 600 four times a day       No current facility-administered medications on file prior to visit.      Social History     Social History   • Marital status: Single     Spouse name: N/A   • Number of children: N/A   • Years of education: N/A     Occupational History   • Not on file.     Social History  "Main Topics   • Smoking status: Current Every Day Smoker     Packs/day: 0.50     Types: Cigarettes   • Smokeless tobacco: Never Used      Comment: 1 PK per day   • Alcohol use No   • Drug use: No   • Sexual activity: Not on file     Other Topics Concern   • Not on file     Social History Narrative   • No narrative on file     family history includes Allergies in her unknown relative; Diabetes in her unknown relative; Other in her unknown relative.      Review of Systems   Constitutional: Negative for chills and fever.   Gastrointestinal: Negative for abdominal pain.   Genitourinary: Negative.    Musculoskeletal: Positive for back pain and joint pain.   Neurological: Positive for tingling and sensory change. Negative for focal weakness and weakness.          Objective:     /80 (BP Location: Left arm, Patient Position: Sitting, BP Cuff Size: Adult)   Pulse 99   Temp 36.6 °C (97.8 °F) (Temporal)   Resp 16   Ht 1.702 m (5' 7\")   Wt 72.6 kg (160 lb)   SpO2 97%   BMI 25.06 kg/m²      Physical Exam   Constitutional: She appears well-developed and well-nourished. No distress.   Cardiovascular: Normal rate, regular rhythm and normal heart sounds.    No murmur heard.  Pulmonary/Chest: Effort normal and breath sounds normal.   Musculoskeletal:        Lumbar back: She exhibits pain. She exhibits normal range of motion, no tenderness, no swelling and no spasm.        Back:    Neurological: She is alert. No sensory deficit. She exhibits normal muscle tone. Gait normal.   Reflex Scores:       Patellar reflexes are 2+ on the right side and 2+ on the left side.       Achilles reflexes are 2+ on the right side and 2+ on the left side.  Nursing note and vitals reviewed.              Assessment/Plan:     1. Sacro ilial pain  REFERRAL TO ORTHOPEDICS    ketorolac (TORADOL) injection 60 mg     Referral to ortho.  toradol here in clinic.  Differential diagnosis, natural history, supportive care, and indications for immediate " follow-up discussed at length.

## 2018-11-29 ENCOUNTER — OFFICE VISIT (OUTPATIENT)
Dept: URGENT CARE | Facility: CLINIC | Age: 38
End: 2018-11-29
Payer: COMMERCIAL

## 2018-11-29 ENCOUNTER — HOSPITAL ENCOUNTER (OUTPATIENT)
Facility: MEDICAL CENTER | Age: 38
End: 2018-11-29
Attending: NURSE PRACTITIONER
Payer: COMMERCIAL

## 2018-11-29 VITALS
DIASTOLIC BLOOD PRESSURE: 80 MMHG | SYSTOLIC BLOOD PRESSURE: 120 MMHG | HEIGHT: 67 IN | WEIGHT: 167 LBS | OXYGEN SATURATION: 95 % | BODY MASS INDEX: 26.21 KG/M2 | HEART RATE: 76 BPM | RESPIRATION RATE: 16 BRPM | TEMPERATURE: 97.7 F

## 2018-11-29 DIAGNOSIS — N89.8 VAGINAL DISCHARGE: ICD-10-CM

## 2018-11-29 DIAGNOSIS — G89.29 CHRONIC PAIN OF LEFT ANKLE: ICD-10-CM

## 2018-11-29 DIAGNOSIS — N89.8 VAGINAL IRRITATION: ICD-10-CM

## 2018-11-29 DIAGNOSIS — M25.572 CHRONIC PAIN OF LEFT ANKLE: ICD-10-CM

## 2018-11-29 LAB
APPEARANCE UR: CLEAR
BILIRUB UR STRIP-MCNC: NORMAL MG/DL
COLOR UR AUTO: YELLOW
GLUCOSE UR STRIP.AUTO-MCNC: NORMAL MG/DL
KETONES UR STRIP.AUTO-MCNC: NORMAL MG/DL
LEUKOCYTE ESTERASE UR QL STRIP.AUTO: NORMAL
NITRITE UR QL STRIP.AUTO: NORMAL
PH UR STRIP.AUTO: 5.5 [PH] (ref 5–8)
PROT UR QL STRIP: NORMAL MG/DL
RBC UR QL AUTO: NORMAL
SP GR UR STRIP.AUTO: 1
UROBILINOGEN UR STRIP-MCNC: 0.2 MG/DL

## 2018-11-29 PROCEDURE — 87480 CANDIDA DNA DIR PROBE: CPT

## 2018-11-29 PROCEDURE — 99214 OFFICE O/P EST MOD 30 MIN: CPT | Mod: 25 | Performed by: NURSE PRACTITIONER

## 2018-11-29 PROCEDURE — 87660 TRICHOMONAS VAGIN DIR PROBE: CPT

## 2018-11-29 PROCEDURE — 81002 URINALYSIS NONAUTO W/O SCOPE: CPT | Performed by: NURSE PRACTITIONER

## 2018-11-29 PROCEDURE — 87510 GARDNER VAG DNA DIR PROBE: CPT

## 2018-11-29 RX ORDER — FEXOFENADINE HCL 180 MG
1 TABLET ORAL
Refills: 5 | COMMUNITY
Start: 2018-10-17 | End: 2021-06-08

## 2018-11-29 RX ORDER — GABAPENTIN 600 MG/1
600 TABLET ORAL 3 TIMES DAILY
COMMUNITY
Start: 2018-11-28

## 2018-11-29 RX ORDER — KETOROLAC TROMETHAMINE 30 MG/ML
60 INJECTION, SOLUTION INTRAMUSCULAR; INTRAVENOUS ONCE
Status: COMPLETED | OUTPATIENT
Start: 2018-11-29 | End: 2018-11-29

## 2018-11-29 RX ADMIN — KETOROLAC TROMETHAMINE 60 MG: 30 INJECTION, SOLUTION INTRAMUSCULAR; INTRAVENOUS at 18:29

## 2018-11-30 DIAGNOSIS — N89.8 VAGINAL DISCHARGE: ICD-10-CM

## 2018-11-30 DIAGNOSIS — N89.8 VAGINAL IRRITATION: ICD-10-CM

## 2018-11-30 NOTE — PROGRESS NOTES
Chief Complaint   Patient presents with   • Vaginal Discharge     x 1 wk, vaginal discharge, itching and little buring with urination       HISTORY OF PRESENT ILLNESS: Patient is a 38 y.o. female who presents to urgent care today with complaints of vaginal irritation and discharge. Notes that for the past week she has had vaginal itching, burning, and discharge.  Notes mild stinging sensation with urination.  Discharge is white and thin.  She denies associated fever, urgency, frequency, chills, abdominal pain, abnormal vaginal bleeding or pain with intercourse.  Last normal menstrual period was 11/2/18.  She was seen for similar symptoms on 10/16, diagnosed with bacterial vaginosis and was prescribed vaginal Flagyl for 1 week.  Patient reported improvement of her symptoms until this week.  She tried over-the-counter Monistat for the past 7 days with mild improvement.  In addition, the patient did request requesting a Toradol injection today.  States that she has chronic pain to bilateral ankles.  She reports left ankle fracture on 9/2/18 with fixation and right ankle sprain. She takes motrin and Neurontin at home for pain but states Toradol injections help significantly.  She has a follow-up appointment with Ortho on December 12.        Patient Active Problem List    Diagnosis Date Noted   • Lower extremity weakness 06/27/2018     Priority: High   • Suicidal intent 11/06/2016     Priority: High   • Stress incontinence 06/27/2018     Priority: Medium   • Fibromyalgia 06/27/2018     Priority: Low   • Recurrent major depressive disorder, in partial remission (HCC) 06/27/2018     Priority: Low   • Chronic pain syndrome 04/03/2017     Priority: Low   • Closed fracture of left ankle with nonunion 09/02/2018   • Inflammatory arthritis 04/03/2017   • Psychophysiological insomnia 04/03/2017   • Hypokalemia 11/07/2016   • Hypomagnesemia 11/07/2016   • Overdose 11/06/2016       Allergies:Latex; Sulfa drugs; Codeine; and  "Morphine    Current Outpatient Prescriptions Ordered in [facility]   Medication Sig Dispense Refill   • [facility] D3 1000 units Cap Take 1 Cap by mouth every day.  5   • gabapentin (NEURONTIN) 600 MG tablet      • ibuprofen (MOTRIN) 800 MG Tab Take 800 mg by mouth every 8 hours as needed.     • lidocaine viscous 2% (XYLOCAINE) 2 % Solution Take 5 mL by mouth as needed for Throat/Mouth Pain (q6hr PRN throat pain, ok to rinse and spit or swallow). 120 mL 0   • ketorolac (TORADOL) 10 MG Tab Take 1 Tab by mouth every 6 hours as needed for Moderate Pain. (Patient not taking: Reported on 11/29/2018) 15 Tab 0   • tizanidine (ZANAFLEX) 4 MG Tab Take 4 mg by mouth 1 time daily as needed.     • traZODone (DESYREL) 150 MG Tab Take 150 mg by mouth every evening.     • venlafaxine (EFFEXOR XR) 150 MG extended-release capsule Take 150 mg by mouth every day.     • acetaminophen (TYLENOL) 325 MG Tab Take 650 mg by mouth every four hours as needed for Moderate Pain.     • gabapentin (NEURONTIN) 300 MG Cap Take 600 mg by mouth 4 times a day. Per pt recently changed to 600 four times a day     • lamoTRIgine (LAMICTAL) 100 MG Tab Take 100 mg by mouth every day.       No current [facility]-ordered facility-administered medications on file.        Past Medical History:   Diagnosis Date   • Anxiety    • Anxiety and depression 06/12/2018   • Back pain    • Bowel habit changes 06/12/2018    \"Constipation/Diarrhea\"   • Bronchitis     HX OF   • Depression    • Fibromyalgia    • Hayfever 06/12/2018   • Indigestion 06/12/2018   • Insomnia    • Muscle spasm 06/12/2018    \"My neurologist told me I have a muscle spasm disorder\"   • Neck pain    • Panic attacks    • Shoulder pain    • Stress incontinence 06/12/2018   • TMJ syndrome        Social History   Substance Use Topics   • Smoking status: Current Every Day Smoker     Packs/day: 0.50     Types: Cigarettes   • Smokeless tobacco: Never Used      Comment: 1 PK per day   • Alcohol use No       Family Status " "  Relation Status   • Unknown (Not Specified)   • Unknown (Not Specified)   • Unknown (Not Specified)     Family History   Problem Relation Age of Onset   • Diabetes Unknown    • Allergies Unknown         RA   • Other Unknown         DIVERTICULITIS, LUPUS, DDD, FM       ROS:  Review of Systems   Constitutional: Negative for fever, chills, weight loss, malaise, and fatigue.   HENT: Negative for ear pain, nosebleeds, congestion, sore throat and neck pain.    Eyes: Negative for vision changes.   Neuro: Negative for headache, sensory changes, weakness, seizure, LOC.   Cardiovascular: Negative for chest pain, palpitations, orthopnea and leg swelling.   Respiratory: Negative for cough, sputum production, shortness of breath and wheezing.   Gastrointestinal: Negative for abdominal pain, nausea, vomiting or diarrhea.   Genitourinary: Positive for burning sensation with urination. Negative for dysuria, urgency and frequency.  Negative for flank pain.  GYN: Positive for vaginal irritation, itching, vaginal discharge.  Negative for abnormal bleeding.  Musculoskeletal: Positive for bilateral ankle pain.  Negative for falls, neck pain, back pain, joint pain, myalgias.   Skin: Negative for rash, diaphoresis.     Exam:  Blood pressure 120/80, pulse 76, temperature 36.5 °C (97.7 °F), temperature source Temporal, resp. rate 16, height 1.702 m (5' 7\"), weight 75.8 kg (167 lb), SpO2 95 %, not currently breastfeeding.  General: well-nourished, well-developed female in NAD  Head: normocephalic, atraumatic  Eyes: PERRLA, no conjunctival injection, acuity grossly intact, lids normal.  Ears: normal shape and symmetry, no tenderness, no discharge. External canals are without any significant edema or erythema. Tympanic membranes are without any inflammation, no effusion. Gross auditory acuity is intact.  Nose: symmetrical without tenderness, no discharge.  Mouth/Throat: reasonable hygiene, no erythema, exudates or tonsillar " enlargement.  Neck: no masses, range of motion within normal limits, no tracheal deviation. No obvious thyroid enlargement.   Lymph: no cervical adenopathy. No supraclavicular adenopathy.   Neuro: alert and oriented. Cranial nerves 1-12 grossly intact. No sensory deficit.   Cardiovascular: regular rate and rhythm. No edema.  Pulmonary: no distress. Chest is symmetrical with respiration, no wheezes, crackles, or rhonchi.   Abdomen: soft, non-tender, no guarding, no hepatosplenomegaly.  GYN: external genitalia is normal in appearance, no lesions or excoriation. Internal examination notes moderate white discharge, no tenderness.   Musculoskeletal: no clubbing, appropriate muscle tone, gait is stable. There is mild swelling noted to right ankle, appropriate ROM. Healing surgical scar noted to lateral aspect of left ankle, mild swelling, limited ROM. No erythema.   Skin: warm, dry, intact, no clubbing, no cyanosis, no rashes.   Psych: appropriate mood, affect, judgement.         Assessment/Plan:  1. Vaginal irritation  POCT Urinalysis    VAGINAL PATHOGENS DNA PANEL   2. Vaginal discharge  POCT Urinalysis    VAGINAL PATHOGENS DNA PANEL   3. Chronic pain of left ankle  ketorolac (TORADOL) injection 60 mg             Urine is negative today for any signs of infectious process.  Will test for vaginal pathogens again, will treat accordingly.  Regarding chronic ankle pain, she is instructed to follow-up with her Ortho as planned.  She will be given 1 dose of Toradol in the clinic today.  Supportive care, differential diagnoses, and indications for immediate follow-up discussed with patient.   Pathogenesis of diagnosis discussed including typical length and natural progression.   Instructed to return to clinic or nearest emergency department for any change in condition, further concerns, or worsening of symptoms.  Patient states understanding of the plan of care and discharge instructions.  Instructed to make an appointment,  for follow up, with her primary care provider.        Please note that this dictation was created using voice recognition software. I have made every reasonable attempt to correct obvious errors, but I expect that there are errors of grammar and possibly content that I did not discover before finalizing the note.      ALYSE Garcia.

## 2018-12-01 LAB
CANDIDA DNA VAG QL PROBE+SIG AMP: NEGATIVE
G VAGINALIS DNA VAG QL PROBE+SIG AMP: POSITIVE
T VAGINALIS DNA VAG QL PROBE+SIG AMP: NEGATIVE

## 2018-12-02 ENCOUNTER — TELEPHONE (OUTPATIENT)
Dept: URGENT CARE | Facility: PHYSICIAN GROUP | Age: 38
End: 2018-12-02

## 2018-12-02 DIAGNOSIS — B96.89 BV (BACTERIAL VAGINOSIS): ICD-10-CM

## 2018-12-02 DIAGNOSIS — N76.0 BV (BACTERIAL VAGINOSIS): ICD-10-CM

## 2018-12-02 RX ORDER — CLINDAMYCIN HYDROCHLORIDE 300 MG/1
300 CAPSULE ORAL 2 TIMES DAILY
Qty: 14 CAP | Refills: 0 | Status: SHIPPED | OUTPATIENT
Start: 2018-12-02 | End: 2018-12-09

## 2018-12-02 NOTE — TELEPHONE ENCOUNTER
The patient was called for re-evaluation, pathogens positive for Gardnerella, a message was left, encouraged to call back to the clinic or return to clinic with any questions or concerns.

## 2018-12-09 ENCOUNTER — HOSPITAL ENCOUNTER (OUTPATIENT)
Dept: RADIOLOGY | Facility: MEDICAL CENTER | Age: 38
End: 2018-12-09
Attending: ORTHOPAEDIC SURGERY
Payer: COMMERCIAL

## 2018-12-09 DIAGNOSIS — M25.551 RIGHT HIP PAIN: ICD-10-CM

## 2018-12-09 DIAGNOSIS — M25.552 LEFT HIP PAIN: ICD-10-CM

## 2018-12-09 PROCEDURE — 72195 MRI PELVIS W/O DYE: CPT

## 2018-12-18 ENCOUNTER — HOSPITAL ENCOUNTER (OUTPATIENT)
Facility: MEDICAL CENTER | Age: 38
End: 2018-12-18
Attending: ORTHOPAEDIC SURGERY | Admitting: ORTHOPAEDIC SURGERY
Payer: COMMERCIAL

## 2018-12-18 VITALS
TEMPERATURE: 97.8 F | BODY MASS INDEX: 26.06 KG/M2 | SYSTOLIC BLOOD PRESSURE: 120 MMHG | RESPIRATION RATE: 16 BRPM | OXYGEN SATURATION: 98 % | HEART RATE: 87 BPM | HEIGHT: 67 IN | DIASTOLIC BLOOD PRESSURE: 71 MMHG | WEIGHT: 166.01 LBS

## 2018-12-18 LAB
B-HCG FREE SERPL-ACNC: <5 MIU/ML
IHCGL IHCGL: NEGATIVE MIU/ML

## 2018-12-18 PROCEDURE — A9270 NON-COVERED ITEM OR SERVICE: HCPCS | Performed by: ANESTHESIOLOGY

## 2018-12-18 PROCEDURE — 700111 HCHG RX REV CODE 636 W/ 250 OVERRIDE (IP): Performed by: ANESTHESIOLOGY

## 2018-12-18 PROCEDURE — A6454 SELF-ADHER BAND W>=3" <5"/YD: HCPCS | Performed by: ORTHOPAEDIC SURGERY

## 2018-12-18 PROCEDURE — C1713 ANCHOR/SCREW BN/BN,TIS/BN: HCPCS | Performed by: ORTHOPAEDIC SURGERY

## 2018-12-18 PROCEDURE — 700101 HCHG RX REV CODE 250

## 2018-12-18 PROCEDURE — 160046 HCHG PACU - 1ST 60 MINS PHASE II: Performed by: ORTHOPAEDIC SURGERY

## 2018-12-18 PROCEDURE — 160035 HCHG PACU - 1ST 60 MINS PHASE I: Performed by: ORTHOPAEDIC SURGERY

## 2018-12-18 PROCEDURE — 500881 HCHG PACK, EXTREMITY: Performed by: ORTHOPAEDIC SURGERY

## 2018-12-18 PROCEDURE — 501838 HCHG SUTURE GENERAL: Performed by: ORTHOPAEDIC SURGERY

## 2018-12-18 PROCEDURE — 500423 HCHG DRESSING, ABD COMBINE: Performed by: ORTHOPAEDIC SURGERY

## 2018-12-18 PROCEDURE — 700111 HCHG RX REV CODE 636 W/ 250 OVERRIDE (IP)

## 2018-12-18 PROCEDURE — 700102 HCHG RX REV CODE 250 W/ 637 OVERRIDE(OP): Performed by: ANESTHESIOLOGY

## 2018-12-18 PROCEDURE — 160041 HCHG SURGERY MINUTES - EA ADDL 1 MIN LEVEL 4: Performed by: ORTHOPAEDIC SURGERY

## 2018-12-18 PROCEDURE — 160036 HCHG PACU - EA ADDL 30 MINS PHASE I: Performed by: ORTHOPAEDIC SURGERY

## 2018-12-18 PROCEDURE — 160002 HCHG RECOVERY MINUTES (STAT): Performed by: ORTHOPAEDIC SURGERY

## 2018-12-18 PROCEDURE — 160025 RECOVERY II MINUTES (STATS): Performed by: ORTHOPAEDIC SURGERY

## 2018-12-18 PROCEDURE — 160029 HCHG SURGERY MINUTES - 1ST 30 MINS LEVEL 4: Performed by: ORTHOPAEDIC SURGERY

## 2018-12-18 PROCEDURE — 160009 HCHG ANES TIME/MIN: Performed by: ORTHOPAEDIC SURGERY

## 2018-12-18 PROCEDURE — 160048 HCHG OR STATISTICAL LEVEL 1-5: Performed by: ORTHOPAEDIC SURGERY

## 2018-12-18 PROCEDURE — 84702 CHORIONIC GONADOTROPIN TEST: CPT

## 2018-12-18 DEVICE — SUTURE ANCHOR TWINFIX 3.5 WITH NEEDLES SMALL JOINT: Type: IMPLANTABLE DEVICE | Site: ANKLE | Status: FUNCTIONAL

## 2018-12-18 RX ORDER — SODIUM CHLORIDE, SODIUM LACTATE, POTASSIUM CHLORIDE, CALCIUM CHLORIDE 600; 310; 30; 20 MG/100ML; MG/100ML; MG/100ML; MG/100ML
INJECTION, SOLUTION INTRAVENOUS ONCE
Status: COMPLETED | OUTPATIENT
Start: 2018-12-18 | End: 2018-12-18

## 2018-12-18 RX ORDER — HALOPERIDOL 5 MG/ML
1 INJECTION INTRAMUSCULAR
Status: DISCONTINUED | OUTPATIENT
Start: 2018-12-18 | End: 2018-12-18 | Stop reason: HOSPADM

## 2018-12-18 RX ORDER — LIDOCAINE HYDROCHLORIDE 10 MG/ML
INJECTION, SOLUTION INFILTRATION; PERINEURAL
Status: COMPLETED
Start: 2018-12-18 | End: 2018-12-18

## 2018-12-18 RX ORDER — ONDANSETRON 2 MG/ML
4 INJECTION INTRAMUSCULAR; INTRAVENOUS
Status: DISCONTINUED | OUTPATIENT
Start: 2018-12-18 | End: 2018-12-18 | Stop reason: HOSPADM

## 2018-12-18 RX ORDER — HYDROMORPHONE HYDROCHLORIDE 1 MG/ML
0.1 INJECTION, SOLUTION INTRAMUSCULAR; INTRAVENOUS; SUBCUTANEOUS
Status: DISCONTINUED | OUTPATIENT
Start: 2018-12-18 | End: 2018-12-18 | Stop reason: HOSPADM

## 2018-12-18 RX ORDER — SODIUM CHLORIDE, SODIUM LACTATE, POTASSIUM CHLORIDE, CALCIUM CHLORIDE 600; 310; 30; 20 MG/100ML; MG/100ML; MG/100ML; MG/100ML
INJECTION, SOLUTION INTRAVENOUS CONTINUOUS
Status: DISCONTINUED | OUTPATIENT
Start: 2018-12-18 | End: 2018-12-18 | Stop reason: HOSPADM

## 2018-12-18 RX ORDER — ASPIRIN 325 MG
325 TABLET ORAL PRN
COMMUNITY
End: 2019-03-16 | Stop reason: CLARIF

## 2018-12-18 RX ORDER — HYDROMORPHONE HYDROCHLORIDE 1 MG/ML
0.4 INJECTION, SOLUTION INTRAMUSCULAR; INTRAVENOUS; SUBCUTANEOUS
Status: DISCONTINUED | OUTPATIENT
Start: 2018-12-18 | End: 2018-12-18 | Stop reason: HOSPADM

## 2018-12-18 RX ORDER — HYDROMORPHONE HYDROCHLORIDE 1 MG/ML
0.2 INJECTION, SOLUTION INTRAMUSCULAR; INTRAVENOUS; SUBCUTANEOUS
Status: DISCONTINUED | OUTPATIENT
Start: 2018-12-18 | End: 2018-12-18 | Stop reason: HOSPADM

## 2018-12-18 RX ORDER — MEPERIDINE HYDROCHLORIDE 25 MG/ML
12.5 INJECTION INTRAMUSCULAR; INTRAVENOUS; SUBCUTANEOUS
Status: DISCONTINUED | OUTPATIENT
Start: 2018-12-18 | End: 2018-12-18 | Stop reason: HOSPADM

## 2018-12-18 RX ORDER — OXYCODONE HCL 5 MG/5 ML
5 SOLUTION, ORAL ORAL
Status: COMPLETED | OUTPATIENT
Start: 2018-12-18 | End: 2018-12-18

## 2018-12-18 RX ORDER — HYDRALAZINE HYDROCHLORIDE 20 MG/ML
5 INJECTION INTRAMUSCULAR; INTRAVENOUS
Status: DISCONTINUED | OUTPATIENT
Start: 2018-12-18 | End: 2018-12-18 | Stop reason: HOSPADM

## 2018-12-18 RX ORDER — OXYCODONE HCL 5 MG/5 ML
10 SOLUTION, ORAL ORAL
Status: COMPLETED | OUTPATIENT
Start: 2018-12-18 | End: 2018-12-18

## 2018-12-18 RX ADMIN — FENTANYL CITRATE 50 MCG: 50 INJECTION, SOLUTION INTRAMUSCULAR; INTRAVENOUS at 12:18

## 2018-12-18 RX ADMIN — Medication 0.5 ML: at 10:40

## 2018-12-18 RX ADMIN — LIDOCAINE HYDROCHLORIDE 0.5 ML: 10 INJECTION, SOLUTION INFILTRATION; PERINEURAL at 10:40

## 2018-12-18 RX ADMIN — OXYCODONE HYDROCHLORIDE 10 MG: 5 SOLUTION ORAL at 12:16

## 2018-12-18 RX ADMIN — HALOPERIDOL LACTATE 1 MG: 5 INJECTION, SOLUTION INTRAMUSCULAR at 12:20

## 2018-12-18 RX ADMIN — SODIUM CHLORIDE, SODIUM LACTATE, POTASSIUM CHLORIDE, CALCIUM CHLORIDE: 600; 310; 30; 20 INJECTION, SOLUTION INTRAVENOUS at 10:40

## 2018-12-18 RX ADMIN — FENTANYL CITRATE 50 MCG: 50 INJECTION, SOLUTION INTRAMUSCULAR; INTRAVENOUS at 12:25

## 2018-12-18 ASSESSMENT — PAIN SCALES - GENERAL
PAINLEVEL_OUTOF10: 6
PAINLEVEL_OUTOF10: 5
PAINLEVEL_OUTOF10: 7
PAINLEVEL_OUTOF10: 8
PAINLEVEL_OUTOF10: 4

## 2018-12-18 NOTE — OR SURGEON
Immediate Post OP Note    PreOp Diagnosis: Right ankle instability, arthrofibrosis, post tib tendonitis    PostOp Diagnosis: Same, 1cm degen tear post tib tendon    Procedure(s):  ANKLE ARTHROSCOPY - Wound Class: Clean  LIGAMENT REPAIR- ANTERIOR TALOFIBULAR/ CALCANEOFIBULAR, POSTERIOR TIBIALIS /REPAIR, DELTOID REPAIR - Wound Class: Clean  IRRIGATION & DEBRIDEMENT ORTHO-POSTERIOR TIBIALIS DEBRIDEMENT - Wound Class: Clean    Surgeon(s):  Jose Ruiz M.D.    Anesthesiologist/Type of Anesthesia:  Anesthesiologist: Nnamdi Bearden M.D./General    Surgical Staff:  Assistant: JOSIE Lopez  Circulator: Lizabeth Camejo R.N.  Scrub Person: Sophia Daniel    Specimens removed if any:  * No specimens in log *    Estimated Blood Loss: 10cc    TT: 36 min @ 250mmHg    Findings: 1cm post tib tear with adhesion prox to turn, marked lateral instability, lateral talar dome scuffing    Complications: None        12/18/2018 11:48 AM Jose Ruiz M.D.

## 2018-12-18 NOTE — OR NURSING
Pt VSS, pain controlled, tolerating po intake. Pt and family given discharge education/instructions, all questions answered. IV discontinued, site wnl. Surgical site wnl. Block intact, unable to wiggle toes, warm, cap refill wnl. Pt discharged home in stable condition with all belongings. Scripts previously given to pt and already filled.

## 2018-12-18 NOTE — DISCHARGE INSTRUCTIONS
ACTIVITY: Rest and take it easy for the first 24 hours.  A responsible adult is recommended to remain with you during that time.  It is normal to feel sleepy.  We encourage you to not do anything that requires balance, judgment or coordination.    MILD FLU-LIKE SYMPTOMS ARE NORMAL. YOU MAY EXPERIENCE GENERALIZED MUSCLE ACHES, THROAT IRRITATION, HEADACHE AND/OR SOME NAUSEA.    FOR 24 HOURS DO NOT:  Drive, operate machinery or run household appliances.  Drink beer or alcoholic beverages.   Make important decisions or sign legal documents.    SPECIAL INSTRUCTIONS:  Non weight bearing right lower leg  Ice and elevate  Stay ahead of pain  Keep splint clean and dry      DIET: To avoid nausea, slowly advance diet as tolerated, avoiding spicy or greasy foods for the first day.  Add more substantial food to your diet according to your physician's instructions.  Babies can be fed formula or breast milk as soon as they are hungry.  INCREASE FLUIDS AND FIBER TO AVOID CONSTIPATION.    SURGICAL DRESSING/BATHING: Keep dressing clean, dry and intact until follow up appointment.     FOLLOW-UP APPOINTMENT:  A follow-up appointment should be arranged with your doctor in 1-2 weeks; call to schedule.    You should CALL YOUR PHYSICIAN if you develop:  Fever greater than 101 degrees F.  Pain not relieved by medication, or persistent nausea or vomiting.  Excessive bleeding (blood soaking through dressing) or unexpected drainage from the wound.  Extreme redness or swelling around the incision site, drainage of pus or foul smelling drainage.  Inability to urinate or empty your bladder within 8 hours.  Problems with breathing or chest pain.    You should call 911 if you develop problems with breathing or chest pain.  If you are unable to contact your doctor or surgical center, you should go to the nearest emergency room or urgent care center.  Physician's telephone #: Dr. Ruiz: [phone]    If any questions arise, call your doctor.   If your doctor is not available, please feel free to call the Surgical Center at [phone].  The Center is open Monday through Friday from 7AM to 7PM.  You can also call the HEALTH HOTLINE open 24 hours/day, 7 days/week and speak to a nurse at [phone], or toll free at [phone].    A registered nurse may call you a few days after your surgery to see how you are doing after your procedure.    MEDICATIONS: Resume taking daily medication.  Take prescribed pain medication with food.  If no medication is prescribed, you may take non-aspirin pain medication if needed.  PAIN MEDICATION CAN BE VERY CONSTIPATING.  Take a stool softener or laxative such as senokot, pericolace, or milk of magnesia if needed.    Prescriptions previously given.  Last pain medication given at 12:16pm.    If your physician has prescribed pain medication that includes Acetaminophen (Tylenol), do not take additional Acetaminophen (Tylenol) while taking the prescribed medication.    Depression / Suicide Risk    As you are discharged from this [facility] facility, it is important to learn how to keep safe from harming yourself.    Recognize the warning signs:  · Abrupt changes in personality, positive or negative- including increase in energy   · Giving away possessions  · Change in eating patterns- significant weight changes-  positive or negative  · Change in sleeping patterns- unable to sleep or sleeping all the time   · Unwillingness or inability to communicate  · Depression  · Unusual sadness, discouragement and loneliness  · Talk of wanting to die  · Neglect of personal appearance   · Rebelliousness- reckless behavior  · Withdrawal from people/activities they love  · Confusion- inability to concentrate     If you or a loved one observes any of these behaviors or has concerns about self-harm, here's what you can do:  · Talk about it- your feelings and reasons for harming yourself  · Remove any means that you might use to hurt  yourself (examples: pills, rope, extension cords, firearm)  · Get professional help from the community (Mental Health, Substance Abuse, psychological counseling)  · Do not be alone:Call your Safe Contact- someone whom you trust who will be there for you.  · Call your local CRISIS HOTLINE 889-6517 or 763-071-8819  · Call your local Children's Mobile Crisis Response Team [place] (024) 302-2923 or www.OROS  · Call the toll free National Suicide Prevention Hotlines   · National Suicide Prevention Lifeline 743-475-ISYX (7711)  · National Hope Line Network 800-SUICIDE (896-0043)

## 2018-12-27 NOTE — OP REPORT
DATE OF SERVICE:  12/18/2018    PREOPERATIVE DIAGNOSES:  Right ankle instability, right ankle arthrofibrosis,   right posterior tibial tendonitis.    POSTOPERATIVE DIAGNOSES:  Right ankle instability, right ankle arthrofibrosis,   right posterior tibial tendonitis, and posterior tibial tendon tear.    PROCEDURES PERFORMED:  1.  Right ankle arthroscopy with extensive debridement.  2.  Right ATFL repair.  3.  Right CFL repair.  4.  Right posterior tibial tendon repair.    SURGEON:  Jose Ruiz MD    ASSISTANT:  JAIDEN Langford    ANESTHESIA:  General with peripheral nerve block requested by me for   postoperative pain control.    ESTIMATED BLOOD LOSS:  10 mL    TOURNIQUET TIME:  36 minutes at 250 mmHg.    IMPLANTS:  [employer] 3.5 mm titanium anchor x1.    FINDINGS:  A 1 cm posterior tibial tendon tear with adhesion just proximal to   the turn, marked lateral instability, partial thickness lateral talar dome   scuffing.    COMPLICATIONS:  None.    OUTCOME:  PACU in stable condition.    HISTORY OF PRESENT ILLNESS:  This is a very pleasant 38-year-old female who we   have treated nonoperatively for ankle instability and pain.  She has gone on   to have persistent pain laterally, persistent instability and has noted   discomfort in her posterior tibial tendon just proximal to the turn.  She was   greeted in the preoperative holding area and identified by name and medical   record number.  The right lower extremity was marked as were her point of   maximal instability.  Risks of the procedure including bleeding, infection,   pain, need for more surgery, neurovascular damage and continuation of symptoms   were discussed and she provided a written consent.    DESCRIPTION OF PROCEDURE:  She was taken to operating room and placed on table   in supine position.  Preoperative antibiotics were administered, general   anesthesia was induced, nonsterile tourniquet was placed on the right thigh,   and right lower  extremity prepped and draped in the usual sterile fashion.  An   operative pause was undertaken, where all present were in agreement with   patient identification, laterality, and procedure to be performed.    Right ankle arthroscopy with extensive debridement.  An Esmarch bandage was   used to exsanguinate the limb and the tourniquet was raised to 250 mmHg.  The   joint was insufflated with 10 mL of sterile normal saline.  Standard medial   portal was made at the level of joint just medial to the tibialis anterior   tendon.  A lateral portal was established followed by a small joint shaver,   extensive arthrofibrotic tissue was seen across the joint, and we worked from   lateral to medial along the distal tibia as well as along the dorsal talar   neck.  A 15 blade was used to transect vastus ligament.  This was then removed   with the shaver.  We probed the joint surface and found a longitudinal scuff   along the lateral dome that was partial thickness.  I did not feel warranted   microfracture.  Anterior impingement was improved.  No evidence of interval   complication.  Arthroscopic instruments were withdrawn.    Posterior tibial tendon repair.  A 2 cm longitudinal incision was made along   the posterior tibial tendon proximal to the turn centered over her point of   maximal tenderness were duly marked in the preoperative holding area.  We   carefully incise the posterior tibial tendon sheath and found it to be   markedly adherent to the tendon.  Tendon was also adherent to the tibia.  This   was carefully released.  We noted a 1 cm degenerative tear encompassing   approximately 20% of the bulk of the tendon.  This was sharply excised using a   new 15 blade.  We then performed a traction tenolysis of the tendon and noted   marked improvement in excursion.  This incision was irrigated.  The tendon   sheath was closed with 3-0 Vicryl in buried interrupted fashion and skin   closed with 3-0 nylon in horizontal  mattress fashion.    ATFL and CFL repair.  A 3 cm curvilinear incision was made along the anterior   portal at the distal fibula, curved posteriorly towards the peroneal tendons.    Tendon sheath was incised.  Tendons were evaluated and found to be without   significant scar or tearing.  We then released ATFL and CFL from the origins   on the fibula.  I used rongeur to remove small osteophytes. A three 3.5 mm   titanium anchor was then placed 1 cm from the tip of the fibula, one arm of   sutures were brought through the ATFL, the other two through the CFL in   modified Uvaldo-Tesfaye fashion.  CFL sutures tied with the ankle in eversion,   the ATFL with the ankle in relaxed plantar flexion and posterior drawer.  Both   talar tilt and anterior drawer result at this point.  I then used 0 Vicryl   suture to oversewn the repair to incorporate the inferior extensor   retinaculum.  Stability was excellent.  The incision was copiously irrigated.    Subcutaneous layer closed with 3-0 Monocryl in buried interrupted fashion.    Skin closed with 3-0 nylon in horizontal mattress fashion.  Xeroform gauze and   a well-padded posterior splint with a stirrup were placed.  Tourniquet was   let down for a total time of 36 minutes.  Patient was awakened, extubated in   stable condition.    POSTOPERATIVE COURSE:  She was discharged home today assuming adequate pain   control and mobilization.  Aspirin 81 mg twice daily for DVT prophylaxis.  We   will see her in 10-14 days for suture removal, wound check and progression of   weightbearing in a stirrup brace.       ____________________________________     MD DEQUAN AU / STEPHEN    DD:  12/27/2018 07:41:35  DT:  12/27/2018 08:18:55    D#:  [id number]  Job#:  [id number]

## 2018-12-30 ENCOUNTER — HOSPITAL ENCOUNTER (EMERGENCY)
Facility: MEDICAL CENTER | Age: 38
End: 2018-12-30
Attending: EMERGENCY MEDICINE
Payer: COMMERCIAL

## 2018-12-30 ENCOUNTER — APPOINTMENT (OUTPATIENT)
Dept: RADIOLOGY | Facility: MEDICAL CENTER | Age: 38
End: 2018-12-30
Attending: EMERGENCY MEDICINE
Payer: COMMERCIAL

## 2018-12-30 VITALS
SYSTOLIC BLOOD PRESSURE: 111 MMHG | DIASTOLIC BLOOD PRESSURE: 72 MMHG | BODY MASS INDEX: 28.37 KG/M2 | HEIGHT: 67 IN | OXYGEN SATURATION: 93 % | HEART RATE: 69 BPM | TEMPERATURE: 98.1 F | RESPIRATION RATE: 17 BRPM | WEIGHT: 180.78 LBS

## 2018-12-30 DIAGNOSIS — R05.9 COUGH: ICD-10-CM

## 2018-12-30 PROCEDURE — 36415 COLL VENOUS BLD VENIPUNCTURE: CPT

## 2018-12-30 PROCEDURE — 71045 X-RAY EXAM CHEST 1 VIEW: CPT

## 2018-12-30 PROCEDURE — 302874 HCHG BANDAGE ACE 2 OR 3""

## 2018-12-30 PROCEDURE — 99284 EMERGENCY DEPT VISIT MOD MDM: CPT

## 2018-12-30 PROCEDURE — 700111 HCHG RX REV CODE 636 W/ 250 OVERRIDE (IP): Performed by: EMERGENCY MEDICINE

## 2018-12-30 PROCEDURE — A9270 NON-COVERED ITEM OR SERVICE: HCPCS | Performed by: EMERGENCY MEDICINE

## 2018-12-30 PROCEDURE — 700102 HCHG RX REV CODE 250 W/ 637 OVERRIDE(OP): Performed by: EMERGENCY MEDICINE

## 2018-12-30 PROCEDURE — 29515 APPLICATION SHORT LEG SPLINT: CPT

## 2018-12-30 PROCEDURE — 96372 THER/PROPH/DIAG INJ SC/IM: CPT

## 2018-12-30 RX ORDER — ACETAMINOPHEN 325 MG/1
1000 TABLET ORAL ONCE
Status: COMPLETED | OUTPATIENT
Start: 2018-12-30 | End: 2018-12-30

## 2018-12-30 RX ORDER — KETOROLAC TROMETHAMINE 30 MG/ML
15 INJECTION, SOLUTION INTRAMUSCULAR; INTRAVENOUS ONCE
Status: COMPLETED | OUTPATIENT
Start: 2018-12-30 | End: 2018-12-30

## 2018-12-30 RX ADMIN — KETOROLAC TROMETHAMINE 15 MG: 30 INJECTION, SOLUTION INTRAMUSCULAR at 02:58

## 2018-12-30 RX ADMIN — ACETAMINOPHEN 975 MG: 325 TABLET, FILM COATED ORAL at 05:15

## 2018-12-30 ASSESSMENT — PAIN SCALES - GENERAL
PAINLEVEL_OUTOF10: 7
PAINLEVEL_OUTOF10: 8

## 2018-12-30 NOTE — ED TRIAGE NOTES
Pt brought in by EMS c/o R foot numbness/tingling/pain  sx started 3 days ago. Pt had surgery on 12/18/18,  Pedal pulse present. EMS removed cast pta, steri-strips intact. No s/s of infection noted.   Productive cough X 1week with reported yellow/green sputum. Chest pain d/t cough. Pt used Albuterol inhaler with minor relief.

## 2018-12-30 NOTE — ED NOTES
Removed IV, applied pressure and bandaged area. Pt was given education discharge instructions and verified understanding. VSS. Pt exhibited stable ambulation upon discharge.

## 2018-12-30 NOTE — ED PROVIDER NOTES
ER Provider Note     Scribed for Andrés Hammer M.D. by Dereck Bundy. 12/30/2018, 2:34 AM.    Primary Care Provider: Elvira Knutson M.D.  Means of Arrival: EMS   History obtained from: Patient  History limited by: None     CHIEF COMPLAINT  Chief Complaint   Patient presents with   • Post-Op Complications     Pain, tingling, burning @ incision site of R ankle. Surgery 12/18/18.   • Cough     X 1 week, yellow/green sputum. Feels SOB.        HPI  Florencia Lau is a 38 y.o. female who presents to the Emergency Department complaining of hoarse voice, cough, and shortness of breath that began one week ago. Her symptoms worsened over the last two days. She began to experience severe coughing this evening. Her symptoms have not improved after using her inhaler. She denies fever. She states that she is normally medicated with Toradol in the ED for her chronic pain related to her fibromyalgia.    Also, on 12/18/2018 the patient underwent a right ankle arthroscopy. Six days ago she was seen by her orthopedic surgeon at [facility] because she was experiencing some numbness of the toes of the right foot. Her ankle wrap and cast were replaced. Over the last couple she has experienced a burning ankle pain that radiates up her calf. Her ankle splint was removed by EMS en route here.    REVIEW OF SYSTEMS  Pertinent positives include hoarse voice, cough, ankle pain, and shortness of breath. Pertinent negatives include no fever.  See HPI for further details. All other systems are negative.     PAST MEDICAL HISTORY   has a past medical history of Anxiety; Anxiety and depression (06/12/2018); Back pain; Bowel habit changes (06/12/2018); Bronchitis; Depression; Fibromyalgia; Hayfever (06/12/2018); Indigestion (06/12/2018); Insomnia; Muscle spasm (06/12/2018); Neck pain; Panic attacks; Shoulder pain; Stress incontinence (06/12/2018); and TMJ syndrome.    SURGICAL HISTORY   has a past surgical history that includes  "tonsillectomy; tubal ligation; other; bladder suspension (6/27/2018); ankle orif (Left, 9/2/2018); ankle arthroscopy (Right, 12/18/2018); ligament repair (Right, 12/18/2018); and irrigation & debridement ortho (Right, 12/18/2018).    SOCIAL HISTORY  Social History   Substance Use Topics   • Smoking status: Current Every Day Smoker     Packs/day: 0.50     Types: Cigarettes   • Smokeless tobacco: Never Used      Comment: 1 PK per day   • Alcohol use No      History   Drug Use No       FAMILY HISTORY  Family History   Problem Relation Age of Onset   • Diabetes Unknown    • Allergies Unknown         RA   • Other Unknown         DIVERTICULITIS, LUPUS, DDD, FM       CURRENT MEDICATIONS  No current facility-administered medications on file prior to encounter.      Current Outpatient Prescriptions on File Prior to Encounter   Medication Sig Dispense Refill   • aspirin (ASA) 325 MG Tab Take 325 mg by mouth as needed.     • CVS D3 1000 units Cap Take 1 Cap by mouth every day.  5   • gabapentin (NEURONTIN) 600 MG tablet      • ibuprofen (MOTRIN) 800 MG Tab Take 800 mg by mouth every 8 hours as needed.     • tizanidine (ZANAFLEX) 4 MG Tab Take 4 mg by mouth 1 time daily as needed.     • traZODone (DESYREL) 150 MG Tab Take 150 mg by mouth every evening.     • venlafaxine (EFFEXOR XR) 150 MG extended-release capsule Take 150 mg by mouth every day.     • lamoTRIgine (LAMICTAL) 100 MG Tab Take 100 mg by mouth every day.           ALLERGIES  Allergies   Allergen Reactions   • Latex Rash   • Sulfa Drugs Unspecified     \"Muscle spasm\".       • Codeine Unspecified     \"Childhood allergie, not sure what happens\".     • Morphine Itching       PHYSICAL EXAM  VITAL SIGNS: /72   Pulse 74   Temp 36.7 °C (98.1 °F)   Resp 20   Ht 1.702 m (5' 7\")   Wt 82 kg (180 lb 12.4 oz)   LMP 12/12/2018   SpO2 97%   BMI 28.31 kg/m²      Constitutional: Alert in no apparent distress.  HENT: No signs of trauma, Bilateral external ears normal, " Nose normal.   Eyes: Pupils are equal and reactive, Conjunctiva normal, Non-icteric.   Neck: Normal range of motion, No tenderness, Supple, No stridor.   Lymphatic: No lymphadenopathy noted.   Cardiovascular: Regular rate and rhythm, no murmurs.   Thorax & Lungs: Scattered rhonchi in all lung fields, No respiratory distress, No wheezing, No chest tenderness.   Abdomen: Bowel sounds normal, Soft, No tenderness, No masses, No pulsatile masses. No peritoneal signs.  Skin: Warm, Dry, No erythema, No rash. Well appearing surgical incisions on the right medial ankle without surround redness and no pain with palpation.  Back: No bony tenderness, No CVA tenderness.   Extremities: Intact distal pulses, No edema, No tenderness, No cyanosis.  Musculoskeletal: Good range of motion in all major joints. No tenderness to palpation or major deformities noted.   Neurologic: Alert , Normal motor function, Normal sensory function, No focal deficits noted.   Psychiatric: Affect normal, Judgment normal, Mood normal.    DIAGNOSTIC STUDIES / PROCEDURES    RADIOLOGY  DX-CHEST-PORTABLE (1 VIEW)   Final Result         1. No acute cardiopulmonary abnormalities are identified.         The radiologist's interpretation of all radiological studies have been reviewed by me.    COURSE & MEDICAL DECISION MAKING  Pertinent Labs & Imaging studies reviewed. (See chart for details)    This is a 38 y.o. female that presents with some mild pain in her right ankle.  The wound appears well.  There is no swelling.  I will replace the cast.  I will get an x-ray to evaluate for pneumonia given she is having some coughing symptoms.  She has no hypoxia and no fevers to suggest pneumonia or sepsis..     2:34 AM - Patient seen and examined at bedside. Ordered DX chest portable 1 view.  Patient will be medicated with ketorolac injection 15 mg for her symptoms.     4:11 AM - Re-examined; The patient is resting in bed comfortably. I discussed her above findings and  plans for discharge in a splint. She was given a referral to her primary care and instructed to return to the ED if her symptoms worsen. Patient understands and agrees.    Patient was found to have a negative chest x-ray.    The patient will return for new or worsening symptoms and is stable at the time of discharge.    The patient is referred to a primary physician for blood pressure management, diabetic screening, and for all other preventative health concerns.    DISPOSITION:  Patient will be discharged home in stable condition.    FOLLOW UP:  Elvira Knutson M.D.  [address] [facility] [id number]  [id number]  [facility] [address]  [phone]            FINAL IMPRESSION  1. Cough          Dereck AHUMADA (Scribe), am scribing for, and in the presence of, Andrés Hammer M.D..    Electronically signed by: Dereck Bundy (Scribe), 12/30/2018    Andrés AHUMADA M.D. personally performed the services described in this documentation, as scribed by Dereck Bundy in my presence, and it is both accurate and complete. C    The note accurately reflects work and decisions made by me.  Andrés Hammer  12/30/2018  6:43 AM

## 2019-02-07 ENCOUNTER — OFFICE VISIT (OUTPATIENT)
Dept: URGENT CARE | Facility: PHYSICIAN GROUP | Age: 39
End: 2019-02-07
Payer: COMMERCIAL

## 2019-02-07 ENCOUNTER — HOSPITAL ENCOUNTER (OUTPATIENT)
Facility: MEDICAL CENTER | Age: 39
End: 2019-02-07
Attending: NURSE PRACTITIONER
Payer: COMMERCIAL

## 2019-02-07 VITALS
DIASTOLIC BLOOD PRESSURE: 80 MMHG | OXYGEN SATURATION: 99 % | HEIGHT: 67 IN | HEART RATE: 72 BPM | WEIGHT: 165 LBS | BODY MASS INDEX: 25.9 KG/M2 | RESPIRATION RATE: 14 BRPM | SYSTOLIC BLOOD PRESSURE: 130 MMHG | TEMPERATURE: 98 F

## 2019-02-07 DIAGNOSIS — N89.8 VAGINAL DISCHARGE: ICD-10-CM

## 2019-02-07 DIAGNOSIS — B37.31 VAGINAL YEAST INFECTION: ICD-10-CM

## 2019-02-07 DIAGNOSIS — R30.0 DYSURIA: ICD-10-CM

## 2019-02-07 LAB
APPEARANCE UR: CLEAR
BILIRUB UR STRIP-MCNC: NEGATIVE MG/DL
COLOR UR AUTO: YELLOW
GLUCOSE UR STRIP.AUTO-MCNC: NEGATIVE MG/DL
KETONES UR STRIP.AUTO-MCNC: NEGATIVE MG/DL
LEUKOCYTE ESTERASE UR QL STRIP.AUTO: NEGATIVE
NITRITE UR QL STRIP.AUTO: NEGATIVE
PH UR STRIP.AUTO: 8.5 [PH] (ref 5–8)
PROT UR QL STRIP: NEGATIVE MG/DL
RBC UR QL AUTO: NEGATIVE
SP GR UR STRIP.AUTO: 1.01
UROBILINOGEN UR STRIP-MCNC: 0.2 MG/DL

## 2019-02-07 PROCEDURE — 99214 OFFICE O/P EST MOD 30 MIN: CPT | Performed by: NURSE PRACTITIONER

## 2019-02-07 PROCEDURE — 87491 CHLMYD TRACH DNA AMP PROBE: CPT

## 2019-02-07 PROCEDURE — 81002 URINALYSIS NONAUTO W/O SCOPE: CPT | Performed by: NURSE PRACTITIONER

## 2019-02-07 PROCEDURE — 87510 GARDNER VAG DNA DIR PROBE: CPT

## 2019-02-07 PROCEDURE — 87660 TRICHOMONAS VAGIN DIR PROBE: CPT

## 2019-02-07 PROCEDURE — 87591 N.GONORRHOEAE DNA AMP PROB: CPT

## 2019-02-07 PROCEDURE — 87480 CANDIDA DNA DIR PROBE: CPT

## 2019-02-07 PROCEDURE — 87086 URINE CULTURE/COLONY COUNT: CPT

## 2019-02-07 RX ORDER — FLUCONAZOLE 150 MG/1
150 TABLET ORAL ONCE
Qty: 2 TAB | Refills: 0 | Status: SHIPPED | OUTPATIENT
Start: 2019-02-07 | End: 2019-02-07

## 2019-02-08 DIAGNOSIS — R30.0 DYSURIA: ICD-10-CM

## 2019-02-08 DIAGNOSIS — N89.8 VAGINAL DISCHARGE: ICD-10-CM

## 2019-02-08 NOTE — PROGRESS NOTES
"Subjective:      Florencia Lau is a 38 y.o. female who presents with Urinary Frequency (poss uti burning/ poss uti )            HPI  Itchy rash in vaginal region, Monistat 1x dose yesterday with mild improvement. Pain inside vaginal canal since yesterday. H/o HSV. States thick white vaginal d/c. Unprotected sexual intercourse 1 week ago. New partner. Prone to yeast infection, has had BV before.    PMH:  has a past medical history of Anxiety; Anxiety and depression (06/12/2018); Back pain; Bowel habit changes (06/12/2018); Bronchitis; Depression; Fibromyalgia; Hayfever (06/12/2018); Indigestion (06/12/2018); Insomnia; Muscle spasm (06/12/2018); Neck pain; Panic attacks; Shoulder pain; Stress incontinence (06/12/2018); and TMJ syndrome.  MEDS:   Current Outpatient Prescriptions:   •  gabapentin (NEURONTIN) 600 MG tablet, , Disp: , Rfl:   •  ibuprofen (MOTRIN) 800 MG Tab, Take 800 mg by mouth every 8 hours as needed., Disp: , Rfl:   •  tizanidine (ZANAFLEX) 4 MG Tab, Take 4 mg by mouth 1 time daily as needed., Disp: , Rfl:   •  traZODone (DESYREL) 150 MG Tab, Take 150 mg by mouth every evening., Disp: , Rfl:   •  aspirin (ASA) 325 MG Tab, Take 325 mg by mouth as needed., Disp: , Rfl:   •  CVS D3 1000 units Cap, Take 1 Cap by mouth every day., Disp: , Rfl: 5  •  venlafaxine (EFFEXOR XR) 150 MG extended-release capsule, Take 150 mg by mouth every day., Disp: , Rfl:   •  lamoTRIgine (LAMICTAL) 100 MG Tab, Take 100 mg by mouth every day., Disp: , Rfl:   ALLERGIES:   Allergies   Allergen Reactions   • Latex Rash   • Sulfa Drugs Unspecified     \"Muscle spasm\".       • Codeine Unspecified     \"Childhood allergie, not sure what happens\".     • Morphine Itching     SURGHX:   Past Surgical History:   Procedure Laterality Date   • ANKLE ARTHROSCOPY Right 12/18/2018    Procedure: ANKLE ARTHROSCOPY;  Surgeon: Jose Ruiz M.D.;  Location: SURGERY [facility];  Service: Orthopedics   • LIGAMENT REPAIR Right " "12/18/2018    Procedure: LIGAMENT REPAIR- ANTERIOR TALOFIBULAR/ CALCANEOFIBULAR, POSTERIOR TIBIALIS REPAIR;  Surgeon: Jose Ruiz M.D.;  Location: SURGERY [facility];  Service: Orthopedics   • IRRIGATION & DEBRIDEMENT ORTHO Right 12/18/2018    Procedure: IRRIGATION & DEBRIDEMENT ORTHO-POSTERIOR TIBIALIS DEBRIDEMENT;  Surgeon: Jose Ruiz M.D.;  Location: SURGERY [facility];  Service: Orthopedics   • ANKLE ORIF Left 9/2/2018    Procedure: ANKLE ORIF;  Surgeon: Dayday Jaramillo M.D.;  Location: SURGERY [facility];  Service: Orthopedics   • BLADDER SUSPENSION  6/27/2018    Procedure: BLADDER SUSPENSION-   FOR: TRANS OBTURATOR TAPE;  Surgeon: Chapin Banks M.D.;  Location: SURGERY SAME DAY [facility];  Service: Gynecology   • OTHER      neck abscess   • TONSILLECTOMY     • TUBAL LIGATION       SOCHX:  reports that she has been smoking Cigarettes.  She has been smoking about 0.50 packs per day. She has never used smokeless tobacco. She reports that she does not drink alcohol or use drugs.  FH: Family history was reviewed, no pertinent findings to report    Review of Systems   Constitutional: Negative for chills, fever and malaise/fatigue.   Respiratory: Negative for shortness of breath.    Cardiovascular: Negative for chest pain, palpitations, orthopnea and leg swelling.   Gastrointestinal: Negative for abdominal pain, blood in stool, constipation, diarrhea, heartburn, melena, nausea and vomiting.   Genitourinary: Negative for dysuria, flank pain, frequency, hematuria and urgency.   Musculoskeletal: Negative for back pain and myalgias.   Skin: Positive for itching and rash.   Neurological: Negative for weakness.   All other systems reviewed and are negative.         Objective:     /80   Pulse 72   Temp 36.7 °C (98 °F) (Temporal)   Resp 14   Ht 1.702 m (5' 7\")   Wt 74.8 kg (165 lb)   SpO2 99%   BMI 25.84 kg/m²      Physical Exam   Constitutional: She is oriented to person, " place, and time. Vital signs are normal. She appears well-developed and well-nourished. She is active and cooperative.  Non-toxic appearance. She does not have a sickly appearance. She does not appear ill. No distress.   HENT:   Head: Normocephalic.   Eyes: Pupils are equal, round, and reactive to light. Conjunctivae and EOM are normal.   Neck: Normal range of motion. Neck supple.   Cardiovascular: Normal rate.    Pulmonary/Chest: Effort normal.   Abdominal: Soft. Bowel sounds are normal. She exhibits no distension. There is no tenderness. There is no rigidity, no rebound, no guarding and no CVA tenderness.   Genitourinary: Pelvic exam was performed with patient supine. There is rash on the right labia. There is no tenderness, lesion or injury on the right labia. There is rash on the left labia. There is no tenderness, lesion or injury on the left labia.   Genitourinary Comments: Redness along labia majora and around vaginal opening, no lesions/blisters or ulcerations seen. Pelvic: external genitalia normal,urethra grossly patent without lesions or scarring, cervix normal in appearance without lesions,vaginal mucosa normal, minimal thick clear/white vaginal discharge, cultures collected for GC/Chlamydia/DNA pathogens. Patient tolerated procedure well with minimal discomfort.      Musculoskeletal: Normal range of motion.   Neurological: She is alert and oriented to person, place, and time.   Skin: Skin is warm and dry. She is not diaphoretic.   Vitals reviewed.              Assessment/Plan:     1. Dysuria    - POCT Urinalysis: WNL  - POCT Pregnancy: NEG  - URINE CULTURE(NEW); Future    2. Vaginal yeast infection    - fluconazole (DIFLUCAN) 150 MG tablet; Take 1 Tab by mouth Once for 1 dose. May repeat in 72 hours, if no improvement.  Dispense: 2 Tab; Refill: 0    3. Vaginal discharge    - CHLAMYDIA/GC PCR URINE OR SWAB; Future  - VAGINAL PATHOGENS DNA PANEL; Future    Refrain from unprotected sexual intercourse   If  vaginal culture is positive, you must be treated and refrain from sexual intercourse for 7 days or when infection clears  Increase water intake  Urinate more frequently and empty bladder completely  Practice good toileting hygiene after bowel movements and sexual intercourse

## 2019-02-09 LAB
C TRACH DNA SPEC QL NAA+PROBE: NEGATIVE
CANDIDA DNA VAG QL PROBE+SIG AMP: NEGATIVE
G VAGINALIS DNA VAG QL PROBE+SIG AMP: NEGATIVE
N GONORRHOEA DNA SPEC QL NAA+PROBE: NEGATIVE
SPECIMEN SOURCE: NORMAL
T VAGINALIS DNA VAG QL PROBE+SIG AMP: NEGATIVE

## 2019-02-09 ASSESSMENT — ENCOUNTER SYMPTOMS
WEAKNESS: 0
ORTHOPNEA: 0
FEVER: 0
VOMITING: 0
NAUSEA: 0
CHILLS: 0
SHORTNESS OF BREATH: 0
PALPITATIONS: 0
HEARTBURN: 0
BACK PAIN: 0
ABDOMINAL PAIN: 0
DIARRHEA: 0
MYALGIAS: 0
BLOOD IN STOOL: 0
CONSTIPATION: 0
FLANK PAIN: 0

## 2019-02-11 LAB
BACTERIA UR CULT: NORMAL
SIGNIFICANT IND 70042: NORMAL
SITE SITE: NORMAL
SOURCE SOURCE: NORMAL

## 2019-03-16 ENCOUNTER — HOSPITAL ENCOUNTER (EMERGENCY)
Facility: MEDICAL CENTER | Age: 39
End: 2019-03-16
Attending: EMERGENCY MEDICINE
Payer: COMMERCIAL

## 2019-03-16 VITALS
OXYGEN SATURATION: 98 % | WEIGHT: 163.14 LBS | HEIGHT: 67 IN | HEART RATE: 85 BPM | BODY MASS INDEX: 25.61 KG/M2 | DIASTOLIC BLOOD PRESSURE: 69 MMHG | RESPIRATION RATE: 18 BRPM | TEMPERATURE: 98 F | SYSTOLIC BLOOD PRESSURE: 129 MMHG

## 2019-03-16 DIAGNOSIS — M54.50 LUMBAR BACK PAIN: ICD-10-CM

## 2019-03-16 LAB
APPEARANCE UR: CLEAR
BILIRUB UR QL STRIP.AUTO: NEGATIVE
COLOR UR: YELLOW
GLUCOSE UR STRIP.AUTO-MCNC: NEGATIVE MG/DL
HCG UR QL: NEGATIVE
KETONES UR STRIP.AUTO-MCNC: 15 MG/DL
LEUKOCYTE ESTERASE UR QL STRIP.AUTO: NEGATIVE
MICRO URNS: ABNORMAL
NITRITE UR QL STRIP.AUTO: NEGATIVE
PH UR STRIP.AUTO: 7 [PH]
PROT UR QL STRIP: NEGATIVE MG/DL
RBC UR QL AUTO: NEGATIVE
SP GR UR REFRACTOMETRY: 1.01
SP GR UR STRIP.AUTO: 1.01

## 2019-03-16 PROCEDURE — 700111 HCHG RX REV CODE 636 W/ 250 OVERRIDE (IP): Performed by: EMERGENCY MEDICINE

## 2019-03-16 PROCEDURE — 81025 URINE PREGNANCY TEST: CPT

## 2019-03-16 PROCEDURE — 700101 HCHG RX REV CODE 250: Performed by: EMERGENCY MEDICINE

## 2019-03-16 PROCEDURE — 99284 EMERGENCY DEPT VISIT MOD MDM: CPT

## 2019-03-16 PROCEDURE — 81003 URINALYSIS AUTO W/O SCOPE: CPT

## 2019-03-16 PROCEDURE — 96372 THER/PROPH/DIAG INJ SC/IM: CPT

## 2019-03-16 RX ORDER — CYCLOBENZAPRINE HCL 10 MG
10 TABLET ORAL NIGHTLY PRN
Status: ON HOLD | COMMUNITY
End: 2019-06-18

## 2019-03-16 RX ORDER — NAPROXEN 500 MG/1
500 TABLET ORAL 2 TIMES DAILY WITH MEALS
Status: SHIPPED | COMMUNITY
End: 2021-06-08

## 2019-03-16 RX ORDER — LIDOCAINE 50 MG/G
1 PATCH TOPICAL ONCE
Status: DISCONTINUED | OUTPATIENT
Start: 2019-03-16 | End: 2019-03-16 | Stop reason: HOSPADM

## 2019-03-16 RX ORDER — HYDROMORPHONE HYDROCHLORIDE 1 MG/ML
1 INJECTION, SOLUTION INTRAMUSCULAR; INTRAVENOUS; SUBCUTANEOUS ONCE
Status: COMPLETED | OUTPATIENT
Start: 2019-03-16 | End: 2019-03-16

## 2019-03-16 RX ORDER — KETOROLAC TROMETHAMINE 30 MG/ML
30 INJECTION, SOLUTION INTRAMUSCULAR; INTRAVENOUS ONCE
Status: COMPLETED | OUTPATIENT
Start: 2019-03-16 | End: 2019-03-16

## 2019-03-16 RX ADMIN — HYDROMORPHONE HYDROCHLORIDE 1 MG: 1 INJECTION, SOLUTION INTRAMUSCULAR; INTRAVENOUS; SUBCUTANEOUS at 16:33

## 2019-03-16 RX ADMIN — LIDOCAINE 1 PATCH: 50 PATCH TOPICAL at 16:33

## 2019-03-16 RX ADMIN — KETOROLAC TROMETHAMINE 30 MG: 30 INJECTION, SOLUTION INTRAMUSCULAR; INTRAVENOUS at 16:33

## 2019-03-16 ASSESSMENT — PAIN DESCRIPTION - DESCRIPTORS: DESCRIPTORS: ACHING

## 2019-03-16 NOTE — DISCHARGE INSTRUCTIONS
You were seen in the Emergency Department for back pain.    Urine and pregnancy test were completed without significant acute abnormalities.    Please use 1,000mg of tylenol or 600mg of ibuprofen every 6 hours as needed for pain in addition to home medications.    Please follow up with your primary care physician.    Return to the Emergency Department with uncontrolled pain, incontinence, numbness or weakness to extremities, or other concerns.

## 2019-03-16 NOTE — ED PROVIDER NOTES
ED Provider Note    CHIEF COMPLAINT  Chief Complaint   Patient presents with   • Low Back Pain       HPI  Florencia Lau is a 39 y.o. female with past medical history of fibromyalgia, chronic pain, anxiety and depression who presents with lower back pain.  Patient states she was driving in her car today when she developed acute onset of pain at her lower lumbar area.  She describes an intermittent sharp, stabbing pain that is worse with certain positions.  No radiation of pain down her legs.  No associated numbness or weakness in extremities.  No bowel or bladder incontinence.  Patient denies history of recent trauma.  She states that this is different than her typical lower back pain.    REVIEW OF SYSTEMS  See HPI for further details.   Positive for lower back pain  Negative for numbness, weakness, incontinence, fevers    PAST MEDICAL HISTORY   has a past medical history of Anxiety; Anxiety and depression (06/12/2018); Back pain; Bowel habit changes (06/12/2018); Bronchitis; Depression; Fibromyalgia; Hayfever (06/12/2018); Indigestion (06/12/2018); Insomnia; Muscle spasm (06/12/2018); Neck pain; Panic attacks; Shoulder pain; Stress incontinence (06/12/2018); and TMJ syndrome.    SOCIAL HISTORY  Social History     Social History Main Topics   • Smoking status: Current Every Day Smoker     Packs/day: 0.50     Types: Cigarettes   • Smokeless tobacco: Never Used      Comment: 1 PK per day   • Alcohol use No   • Drug use: No   • Sexual activity: Not on file       SURGICAL HISTORY   has a past surgical history that includes tonsillectomy; tubal ligation; other; bladder suspension (6/27/2018); ankle orif (Left, 9/2/2018); ankle arthroscopy (Right, 12/18/2018); ligament repair (Right, 12/18/2018); and irrigation & debridement ortho (Right, 12/18/2018).    CURRENT MEDICATIONS  Home Medications     Reviewed by Ruth Lang (Pharmacy Tech) on 03/16/19 at 1627  Med List Status: Complete   Medication Last Dose  "Status   CVS D3 1000 units Cap FEW DAYS AGO Active   cyclobenzaprine (FLEXERIL) 10 MG Tab 3/15/2019 Active   gabapentin (NEURONTIN) 600 MG tablet 3/16/2019 Active   lamoTRIgine (LAMICTAL) 100 MG Tab 3/15/2019 Active   naproxen (NAPROSYN) 500 MG Tab 3/15/2019 Active   traZODone (DESYREL) 150 MG Tab 3/15/2019 Active   venlafaxine (EFFEXOR XR) 150 MG extended-release capsule 3/15/2019 Active                ALLERGIES  Allergies   Allergen Reactions   • Latex Rash   • Sulfa Drugs Unspecified     \"Muscle spasm\".       • Codeine Unspecified     \"Childhood allergie, not sure what happens\".     • Morphine Itching       PHYSICAL EXAM  VITAL SIGNS: /69   Pulse 85   Temp 36.7 °C (98 °F) (Temporal)   Resp 18   Ht 1.702 m (5' 7\")   Wt 74 kg (163 lb 2.3 oz)   LMP 02/20/2019 (Approximate)   SpO2 98%   BMI 25.55 kg/m²    Constitutional: Well-appearing young female.  Alert in no apparent distress.  HENT: Normocephalic, Atraumatic. Bilateral external ears normal. Nose normal. Moist mucous membranes.  Neck: Supple, full range of motion.  Eyes: Pupils are equal and reactive. Conjunctiva normal.   Heart: Regular rate and rhythm. No murmurs.    Lungs: No respiratory distress.  Normal work of breathing.  Clear to auscultation bilaterally.  Abdomen:  Soft, no distention. No tenderness to palpation  Skin: Warm, Dry. No rash.   Musculoskeletal: Atraumatic, no deformities noted.  Tenderness to palpation at the midline lower lumbar and sacral area.  No overlying skin changes.  Neurologic: Alert and oriented. Moving all extremities spontaneously.  Negative straight leg raise test bilaterally.  Strength 5/5 in bilateral lower extremities.  Sensation intact.  Normal gait.  Psychiatric: Affect normal, Mood normal. Appears appropriate and not intoxicated.       DIAGNOSTIC STUDIES      LABS  Personally reviewed by me  Labs Reviewed   URINALYSIS,CULTURE IF INDICATED - Abnormal; Notable for the following:        Result Value    Ketones 15 " "(*)     All other components within normal limits   HCG QUALITATIVE UR   REFRACTOMETER SG         ED COURSE  Vitals:    03/16/19 1348 03/16/19 1350 03/16/19 1711   BP:  138/74 129/69   Pulse:  89 85   Resp:  20 18   Temp:  36.4 °C (97.6 °F) 36.7 °C (98 °F)   TempSrc:  Temporal Temporal   SpO2:  100% 98%   Weight: 74 kg (163 lb 2.3 oz)     Height: 1.702 m (5' 7\")           Medications administered:  Medications   ketorolac (TORADOL) injection 30 mg (30 mg Intramuscular Given 3/16/19 1633)   HYDROmorphone pf (DILAUDID) injection 1 mg (1 mg Intramuscular Given 3/16/19 1633)       MEDICAL DECISION MAKING  Patient with history of fibromyalgia and chronic pain who presents with acute onset of atraumatic lower back pain today.  She is afebrile with normal vitals on arrival and well-appearing.  Patient has no red flag symptoms or exam findings concerning for cauda equina syndrome, epidural abscess, epidural hematoma.  She has no trauma to necessitate imaging at this time.  No overlying skin changes or signs of this process.  I suspect that pain is due to musculoskeletal etiology.  Patient will be treated symptomatically followed by discharge home with continued symptomatic care and follow-up with her primary care physician and spinal doctor.  She understands plan of care and return precautions for changing or worsening symptoms.      IMPRESSION  (M54.5) Lumbar back pain    Disposition: Discharge home, stable condition  Results, diagnoses, and treatment options were discussed with the patient and/or family. Patient verbalized understanding of plan of care and strict return precautions prior to discharge.    Patient referred to primary care provider for monitoring and treatment of blood pressure.      Discharge Medication List as of 3/16/2019  5:06 PM            Electronically signed by: Thania Suero, 3/16/2019 4:56 PM          "

## 2019-03-24 ENCOUNTER — HOSPITAL ENCOUNTER (EMERGENCY)
Facility: MEDICAL CENTER | Age: 39
End: 2019-03-24
Attending: EMERGENCY MEDICINE
Payer: COMMERCIAL

## 2019-03-24 VITALS
TEMPERATURE: 98.4 F | HEIGHT: 67 IN | DIASTOLIC BLOOD PRESSURE: 87 MMHG | RESPIRATION RATE: 18 BRPM | WEIGHT: 166.01 LBS | OXYGEN SATURATION: 97 % | SYSTOLIC BLOOD PRESSURE: 134 MMHG | BODY MASS INDEX: 26.06 KG/M2 | HEART RATE: 85 BPM

## 2019-03-24 DIAGNOSIS — R30.0 DYSURIA: ICD-10-CM

## 2019-03-24 DIAGNOSIS — N30.90 CYSTITIS: ICD-10-CM

## 2019-03-24 LAB
APPEARANCE UR: ABNORMAL
BACTERIA #/AREA URNS HPF: ABNORMAL /HPF
BILIRUB UR QL STRIP.AUTO: NEGATIVE
COLOR UR: YELLOW
EPI CELLS #/AREA URNS HPF: ABNORMAL /HPF
GLUCOSE UR STRIP.AUTO-MCNC: NEGATIVE MG/DL
KETONES UR STRIP.AUTO-MCNC: ABNORMAL MG/DL
LEUKOCYTE ESTERASE UR QL STRIP.AUTO: NEGATIVE
MICRO URNS: ABNORMAL
MUCOUS THREADS #/AREA URNS HPF: ABNORMAL /HPF
NITRITE UR QL STRIP.AUTO: NEGATIVE
PH UR STRIP.AUTO: 5 [PH]
PROT UR QL STRIP: NEGATIVE MG/DL
RBC # URNS HPF: ABNORMAL /HPF
RBC UR QL AUTO: ABNORMAL
SP GR UR REFRACTOMETRY: 1.03
UNIDENT CRYS URNS QL MICRO: ABNORMAL /HPF
WBC #/AREA URNS HPF: ABNORMAL /HPF

## 2019-03-24 PROCEDURE — 81001 URINALYSIS AUTO W/SCOPE: CPT

## 2019-03-24 PROCEDURE — 99284 EMERGENCY DEPT VISIT MOD MDM: CPT

## 2019-03-24 PROCEDURE — A9270 NON-COVERED ITEM OR SERVICE: HCPCS | Performed by: EMERGENCY MEDICINE

## 2019-03-24 PROCEDURE — 700102 HCHG RX REV CODE 250 W/ 637 OVERRIDE(OP): Performed by: EMERGENCY MEDICINE

## 2019-03-24 RX ORDER — PHENAZOPYRIDINE HYDROCHLORIDE 200 MG/1
200 TABLET, FILM COATED ORAL 3 TIMES DAILY PRN
Qty: 6 TAB | Refills: 0 | Status: ON HOLD | OUTPATIENT
Start: 2019-03-24 | End: 2019-06-18

## 2019-03-24 RX ORDER — CEPHALEXIN 250 MG/1
500 CAPSULE ORAL ONCE
Status: COMPLETED | OUTPATIENT
Start: 2019-03-24 | End: 2019-03-24

## 2019-03-24 RX ORDER — CEPHALEXIN 500 MG/1
500 CAPSULE ORAL 4 TIMES DAILY
Qty: 28 CAP | Refills: 0 | Status: SHIPPED | OUTPATIENT
Start: 2019-03-24 | End: 2019-03-31

## 2019-03-24 RX ORDER — FLUCONAZOLE 150 MG/1
150 TABLET ORAL DAILY
Qty: 1 TAB | Refills: 0 | Status: SHIPPED | OUTPATIENT
Start: 2019-03-24 | End: 2019-03-25

## 2019-03-24 RX ADMIN — CEPHALEXIN 500 MG: 250 CAPSULE ORAL at 22:17

## 2019-03-25 NOTE — ED PROVIDER NOTES
ED Provider Note    CHIEF COMPLAINT  Chief Complaint   Patient presents with   • Flank Pain     Started yesterday. Pt had bilateral LQ pain, hematuria. Thought she passed a kidney stone and felt better. Today started having worsening flank pain and dysuria.    • Nausea       HPI  Florencia Lau is a 39 y.o. female who presents to the emergency department chief complaint of dysuria.  The patient states she is having bladder spasms that began last evening and thinks maybe she passed kidney stone even though she is never had one before.  She actually denies any flank pain she is having bilateral lower back and suprapubic discomfort.  She has a little bit of nausea but no fevers or chills and no vomiting.  She is not nauseous now she had a little nausea yesterday when the pain began.  She is currently menstruating but states that it is her urine has been looking really dark she actually caught it in her own cup at home.  She has a history of fibromyalgia so she thinks that part of her lower back pain could be that.  She denies any upper quadrant or upper back pain  She denies any vaginal discharge burning or itching    REVIEW OF SYSTEMS  Positives as above. Pertinent negatives include vomiting fevers chills vaginal discharge burning itching upper flank pain upper abdominal pain  All other review of systems are negative    PAST MEDICAL HISTORY   has a past medical history of Anxiety; Anxiety and depression (06/12/2018); Back pain; Bowel habit changes (06/12/2018); Bronchitis; Depression; Fibromyalgia; Hayfever (06/12/2018); Indigestion (06/12/2018); Insomnia; Muscle spasm (06/12/2018); Neck pain; Panic attacks; Shoulder pain; Stress incontinence (06/12/2018); and TMJ syndrome.    SOCIAL HISTORY  Social History     Social History Main Topics   • Smoking status: Current Every Day Smoker     Packs/day: 0.50     Types: Cigarettes   • Smokeless tobacco: Never Used      Comment: 1 PK per day   • Alcohol use No   • Drug  "use: No   • Sexual activity: Not on file       SURGICAL HISTORY   has a past surgical history that includes tonsillectomy; tubal ligation; other; bladder suspension (6/27/2018); ankle orif (Left, 9/2/2018); ankle arthroscopy (Right, 12/18/2018); ligament repair (Right, 12/18/2018); and irrigation & debridement ortho (Right, 12/18/2018).    CURRENT MEDICATIONS  Home Medications    **Home medications have not yet been reviewed for this encounter**         ALLERGIES  Allergies   Allergen Reactions   • Latex Rash   • Sulfa Drugs Unspecified     \"Muscle spasm\".       • Codeine Unspecified     \"Childhood allergie, not sure what happens\".     • Morphine Itching       PHYSICAL EXAM  VITAL SIGNS: /43   Pulse (!) 109   Temp 36.2 °C (97.1 °F) (Temporal)   Resp 18   Ht 1.702 m (5' 7\")   Wt 75.3 kg (166 lb 0.1 oz)   LMP 03/23/2019 (Exact Date)   SpO2 97%   BMI 26.00 kg/m²    Pulse ox interpretation: I interpret this pulse ox as normal.  Constitutional: Alert in no apparent distress.  HENT: Normocephalic atraumatic, MMM  Eyes: PER, Conjunctiva normal, Non-icteric.   Neck: Normal range of motion, No tenderness, Supple, No stridor.   Cardiovascular: Regular rate and rhythm, no murmurs.   Thorax & Lungs: Normal breath sounds, No respiratory distress, No wheezing, No chest tenderness.   Abdomen: Bowel sounds normal, Soft, No tenderness, No pulsatile masses. No peritoneal signs.  Skin: Warm, Dry, No erythema, No rash.   Back: No bony tenderness, No CVA tenderness.   Extremities: Intact distal pulses, No edema, No tenderness, No cyanosis  Neurologic: Alert and oriented x3, No focal deficits noted.       DIFFERENTIAL DIAGNOSIS AND WORK UP PLAN    This is a 39 y.o. female who presents with some dysuria and what sounds like bladder spasm her abdomen is soft nontender she has no flank pain or CVA tenderness -as the pain is bilateral I have low concern for this being a kidney stone because be extremely uncommon to have " "bilateral renal stones.  She was all tachycardic on arrival but on my examination is no longer so.  She is not showing any signs of nausea.  I suspect possibly hemorrhagic cystitis or urinary tract infection she has not had any vaginal complaints but she is also on her menstruation so was kind of hard to tell whether or not she is actually just having menstrual pains.  Will perform urinalysis and reassess    DIAGNOSTIC STUDIES / PROCEDURES    LABS  Pertinent Lab Findings    Labs Reviewed   URINALYSIS,CULTURE IF INDICATED - Abnormal; Notable for the following:        Result Value    Character Hazy (*)     Ketones Trace (*)     Occult Blood Large (*)     All other components within normal limits   URINE MICROSCOPIC (W/UA) - Abnormal; Notable for the following:     WBC 5-10 (*)     RBC  (*)     Bacteria Few (*)     All other components within normal limits   REFRACTOMETER SG   CBC WITH DIFFERENTIAL   COMP METABOLIC PANEL   LIPASE       RADIOLOGY  No orders to display     The radiologist's interpretation of all radiological studies have been reviewed by me.      COURSE & MEDICAL DECISION MAKING  Pertinent Labs & Imaging studies reviewed. (See chart for details)    Discussed with the patient potentially has a urinary tract infection especially with some bacteria and white blood cells no other signs of infection such as sepsis, CVA tenderness I do not believe that she is got pyelonephritis.  We will treat patient with some Keflex and Pyridium, she is requesting a dose here prior to discharge in a stronger dose because \"my fibromyalgia makes me have difficulty with antibiotics\".  I do not take ibuprofen and Tylenol return to the emergency department in 2-3 days for any new or worsening symptoms such as upper flank pain fevers or vomiting.  She understands feels comfortable going home    /87   Pulse 85   Temp 36.9 °C (98.4 °F) (Temporal)   Resp 18   Ht 1.702 m (5' 7\")   Wt 75.3 kg (166 lb 0.1 oz)   LMP " 03/23/2019 (Exact Date)   SpO2 97%   BMI 26.00 kg/m²     The patient will return for new or worsening symptoms and is stable at the time of discharge.    The patient is referred to a primary physician for blood pressure management, diabetic screening, and for all other preventative health concerns.    DISPOSITION:  Patient will be discharged home in stable condition.    FOLLOW UP:  Elvira Knutson M.D.  [address]  [address]  [place] [address]  [phone]    Schedule an appointment as soon as possible for a visit       [facility], Emergency Dept  [address]  [place] [id number] [phone]  In 3 days  If symptoms worsen      OUTPATIENT MEDICATIONS:  Discharge Medication List as of 3/24/2019 10:20 PM      START taking these medications    Details   cephALEXin (KEFLEX) 500 MG Cap Take 1 Cap by mouth 4 times a day for 7 days., Disp-28 Cap, R-0, Normal      phenazopyridine (PYRIDIUM) 200 MG Tab Take 1 Tab by mouth 3 times a day as needed., Disp-6 Tab, R-0, Normal      fluconazole (DIFLUCAN) 150 MG tablet Take 1 Tab by mouth every day for 1 day., Disp-1 Tab, R-0, Normal                 FINAL IMPRESSION  1. Cystitis    2. Dysuria              Electronically signed by: Yael Williamson, 3/24/2019 9:54 PM    This dictation has been created using voice recognition software and/or scribes. The accuracy of the dictation is limited by the abilities of the software and the expertise of the scribes. I expect there may be some errors of grammar and possibly content. I made every attempt to manually correct the errors within my dictation. However, errors related to voice recognition software and/or scribes may still exist and should be interpreted within the appropriate context.

## 2019-03-25 NOTE — ED NOTES
Discharge information reviewed in detail. Patient verbalized understanding of discharge instructions to follow up with Eamon and to return to ER if condition worsens.    Patient educated on three new prescription(s). Self to drive home.   Patient ambulated out of ER in a steady gait.

## 2019-04-04 ENCOUNTER — HOSPITAL ENCOUNTER (OUTPATIENT)
Facility: MEDICAL CENTER | Age: 39
End: 2019-04-04
Attending: PHYSICIAN ASSISTANT
Payer: COMMERCIAL

## 2019-04-04 ENCOUNTER — OFFICE VISIT (OUTPATIENT)
Dept: URGENT CARE | Facility: PHYSICIAN GROUP | Age: 39
End: 2019-04-04
Payer: COMMERCIAL

## 2019-04-04 VITALS
TEMPERATURE: 98.1 F | OXYGEN SATURATION: 98 % | HEIGHT: 67 IN | RESPIRATION RATE: 13 BRPM | WEIGHT: 170 LBS | HEART RATE: 107 BPM | DIASTOLIC BLOOD PRESSURE: 80 MMHG | BODY MASS INDEX: 26.68 KG/M2 | SYSTOLIC BLOOD PRESSURE: 122 MMHG

## 2019-04-04 DIAGNOSIS — N89.8 VAGINAL DISCHARGE: ICD-10-CM

## 2019-04-04 DIAGNOSIS — R30.0 DYSURIA: ICD-10-CM

## 2019-04-04 LAB
APPEARANCE UR: CLEAR
BILIRUB UR STRIP-MCNC: NORMAL MG/DL
COLOR UR AUTO: NORMAL
GLUCOSE UR STRIP.AUTO-MCNC: NORMAL MG/DL
KETONES UR STRIP.AUTO-MCNC: NORMAL MG/DL
LEUKOCYTE ESTERASE UR QL STRIP.AUTO: NORMAL
NITRITE UR QL STRIP.AUTO: NORMAL
PH UR STRIP.AUTO: 7.5 [PH] (ref 5–8)
PROT UR QL STRIP: NORMAL MG/DL
RBC UR QL AUTO: NORMAL
SP GR UR STRIP.AUTO: 1.01
UROBILINOGEN UR STRIP-MCNC: NORMAL MG/DL

## 2019-04-04 PROCEDURE — 87591 N.GONORRHOEAE DNA AMP PROB: CPT

## 2019-04-04 PROCEDURE — 87086 URINE CULTURE/COLONY COUNT: CPT

## 2019-04-04 PROCEDURE — 87480 CANDIDA DNA DIR PROBE: CPT

## 2019-04-04 PROCEDURE — 87510 GARDNER VAG DNA DIR PROBE: CPT

## 2019-04-04 PROCEDURE — 81002 URINALYSIS NONAUTO W/O SCOPE: CPT | Performed by: PHYSICIAN ASSISTANT

## 2019-04-04 PROCEDURE — 87660 TRICHOMONAS VAGIN DIR PROBE: CPT

## 2019-04-04 PROCEDURE — 87491 CHLMYD TRACH DNA AMP PROBE: CPT

## 2019-04-04 PROCEDURE — 99213 OFFICE O/P EST LOW 20 MIN: CPT | Performed by: PHYSICIAN ASSISTANT

## 2019-04-04 ASSESSMENT — ENCOUNTER SYMPTOMS
SORE THROAT: 0
SHORTNESS OF BREATH: 0
ABDOMINAL PAIN: 0
FLANK PAIN: 0
SWEATS: 0
DIARRHEA: 0
CONSTIPATION: 0
CHILLS: 0
VOMITING: 0
MYALGIAS: 0
NAUSEA: 0
HEADACHES: 0
VAGINITIS: 1
FEVER: 0

## 2019-04-05 DIAGNOSIS — N89.8 VAGINAL DISCHARGE: ICD-10-CM

## 2019-04-05 DIAGNOSIS — R30.0 DYSURIA: ICD-10-CM

## 2019-04-05 LAB
C TRACH DNA SPEC QL NAA+PROBE: NEGATIVE
N GONORRHOEA DNA SPEC QL NAA+PROBE: NEGATIVE
SPECIMEN SOURCE: NORMAL

## 2019-04-05 NOTE — PROGRESS NOTES
Subjective:   Florencia Lau is a 39 y.o. female who presents for Dysuria (ongoing infections and UTI's, finished menses last week and started with brown blood )       Dysuria    This is a new problem. The current episode started in the past 7 days. The problem occurs intermittently. The problem has been unchanged. The quality of the pain is described as burning. The pain is mild. There has been no fever. She is sexually active. Associated symptoms include a discharge. Pertinent negatives include no chills, flank pain, frequency, hematuria, hesitancy, nausea, sweats, urgency or vomiting. She has tried nothing for the symptoms. The treatment provided no relief.   Vaginitis   The patient's primary symptoms include vaginal discharge. The patient's pertinent negatives include no genital itching, genital lesions, genital odor, genital rash, missed menses, pelvic pain or vaginal bleeding. This is a new problem. The current episode started in the past 7 days. The problem occurs constantly. The problem has been waxing and waning. The patient is experiencing no pain. Associated symptoms include dysuria. Pertinent negatives include no abdominal pain, chills, constipation, diarrhea, fever, flank pain, frequency, headaches, hematuria, nausea, painful intercourse, rash, sore throat, urgency or vomiting. The vaginal discharge was brown. The vaginal bleeding is typical of menses. She has not been passing clots. She has not been passing tissue. Nothing aggravates the symptoms. She has tried nothing for the symptoms. The treatment provided no relief. She is sexually active. No, her partner does not have an STD. Her menstrual history has been regular.     Review of Systems   Constitutional: Negative for chills and fever.   HENT: Negative for sore throat.    Respiratory: Negative for shortness of breath.    Cardiovascular: Negative for chest pain.   Gastrointestinal: Negative for abdominal pain, constipation, diarrhea, nausea  "and vomiting.   Genitourinary: Positive for dysuria and vaginal discharge. Negative for flank pain, frequency, hematuria, hesitancy, missed menses, pelvic pain and urgency.        Positive for vaginal discharge   Musculoskeletal: Negative for myalgias.   Skin: Negative for rash.   Neurological: Negative for headaches.   All other systems reviewed and are negative.      PMH:  has a past medical history of Anxiety; Anxiety and depression (06/12/2018); Back pain; Bowel habit changes (06/12/2018); Bronchitis; Depression; Fibromyalgia; Hayfever (06/12/2018); Indigestion (06/12/2018); Insomnia; Muscle spasm (06/12/2018); Neck pain; Panic attacks; Shoulder pain; Stress incontinence (06/12/2018); and TMJ syndrome.    MEDS:   Current Outpatient Prescriptions:   •  naproxen (NAPROSYN) 500 MG Tab, Take 500 mg by mouth 2 times a day, with meals., Disp: , Rfl:   •  cyclobenzaprine (FLEXERIL) 10 MG Tab, Take 10 mg by mouth at bedtime as needed., Disp: , Rfl:   •  [employer] D3 1000 units Cap, Take 1 Cap by mouth every day., Disp: , Rfl: 5  •  gabapentin (NEURONTIN) 600 MG tablet, Take 600 mg by mouth 4 times a day., Disp: , Rfl:   •  traZODone (DESYREL) 150 MG Tab, Take  mg by mouth every evening., Disp: , Rfl:   •  venlafaxine (EFFEXOR XR) 150 MG extended-release capsule, Take 150 mg by mouth every day., Disp: , Rfl:   •  lamoTRIgine (LAMICTAL) 100 MG Tab, Take 100 mg by mouth every day., Disp: , Rfl:   •  phenazopyridine (PYRIDIUM) 200 MG Tab, Take 1 Tab by mouth 3 times a day as needed., Disp: 6 Tab, Rfl: 0    ALLERGIES:   Allergies   Allergen Reactions   • Latex Rash   • Sulfa Drugs Unspecified     \"Muscle spasm\".       • Codeine Unspecified     \"Childhood allergie, not sure what happens\".     • Morphine Itching       SURGHX:   Past Surgical History:   Procedure Laterality Date   • ANKLE ARTHROSCOPY Right 12/18/2018    Procedure: ANKLE ARTHROSCOPY;  Surgeon: Jose Ruiz M.D.;  Location: SURGERY [facility];  Service: " "Orthopedics   • LIGAMENT REPAIR Right 12/18/2018    Procedure: LIGAMENT REPAIR- ANTERIOR TALOFIBULAR/ CALCANEOFIBULAR, POSTERIOR TIBIALIS REPAIR;  Surgeon: Jose Ruiz M.D.;  Location: SURGERY [facility];  Service: Orthopedics   • IRRIGATION & DEBRIDEMENT ORTHO Right 12/18/2018    Procedure: IRRIGATION & DEBRIDEMENT ORTHO-POSTERIOR TIBIALIS DEBRIDEMENT;  Surgeon: Jose Ruiz M.D.;  Location: SURGERY [facility];  Service: Orthopedics   • ANKLE ORIF Left 9/2/2018    Procedure: ANKLE ORIF;  Surgeon: Dayday Jaramillo M.D.;  Location: SURGERY [facility];  Service: Orthopedics   • BLADDER SUSPENSION  6/27/2018    Procedure: BLADDER SUSPENSION-   FOR: TRANS OBTURATOR TAPE;  Surgeon: Chapin Banks M.D.;  Location: SURGERY SAME DAY [facility];  Service: Gynecology   • OTHER      neck abscess   • TONSILLECTOMY     • TUBAL LIGATION         SOCHX:  reports that she has been smoking Cigarettes.  She has been smoking about 0.50 packs per day. She has never used smokeless tobacco. She reports that she does not drink alcohol or use drugs.    FH: Reviewed with patient, not pertinent to this visit.     Objective:   /80   Pulse (!) 107   Temp 36.7 °C (98.1 °F)   Resp 13   Ht 1.702 m (5' 7\")   Wt 77.1 kg (170 lb)   LMP 03/23/2019 (Exact Date)   SpO2 98%   BMI 26.63 kg/m²   Physical Exam   Constitutional: She is oriented to person, place, and time. She appears well-developed and well-nourished. No distress.   HENT:   Head: Normocephalic and atraumatic.   Nose: Nose normal.   Eyes: Conjunctivae and EOM are normal.   Neck: Normal range of motion. No tracheal deviation present.   Pulmonary/Chest: Effort normal. No respiratory distress.   Abdominal: Soft. Bowel sounds are normal. She exhibits no distension and no mass. There is no hepatosplenomegaly. There is no tenderness. There is no rigidity, no rebound, no guarding and no CVA tenderness.   Genitourinary: Pelvic exam was performed with " patient supine. There is no rash, tenderness or lesion on the right labia. There is no rash, tenderness or lesion on the left labia. No erythema, tenderness or bleeding in the vagina. No foreign body in the vagina. No signs of injury around the vagina. Vaginal discharge found.   Musculoskeletal:   ROM normal all four extremities   Neurological: She is alert and oriented to person, place, and time.   Skin: Skin is warm and dry.   Psychiatric: She has a normal mood and affect. Her behavior is normal. Judgment and thought content normal.   Vitals reviewed.      Assessment/Plan:   1. Dysuria  - URINE CULTURE(NEW); Future  - POCT Urinalysis    2. Vaginal discharge  - VAGINAL PATHOGENS DNA PANEL; Future  - Chlamydia/GC PCR Urine Or Swab; Future    - Will call patient with culture and swab results. Patient asks to leave message if necessary.   - Advised to avoid sex until results are given  - Advised to return if symptoms worsen    Differential diagnosis, natural history, supportive care, and indications for immediate follow-up discussed.

## 2019-04-07 DIAGNOSIS — N76.0 BV (BACTERIAL VAGINOSIS): ICD-10-CM

## 2019-04-07 DIAGNOSIS — T36.95XA ANTIBIOTIC-INDUCED YEAST INFECTION: ICD-10-CM

## 2019-04-07 DIAGNOSIS — B96.89 BV (BACTERIAL VAGINOSIS): ICD-10-CM

## 2019-04-07 DIAGNOSIS — B37.9 ANTIBIOTIC-INDUCED YEAST INFECTION: ICD-10-CM

## 2019-04-07 LAB
BACTERIA UR CULT: NORMAL
SIGNIFICANT IND 70042: NORMAL
SITE SITE: NORMAL
SOURCE SOURCE: NORMAL

## 2019-04-07 RX ORDER — FLUCONAZOLE 150 MG/1
TABLET ORAL
Qty: 2 TAB | Refills: 0 | Status: ON HOLD | OUTPATIENT
Start: 2019-04-07 | End: 2019-06-18

## 2019-04-07 RX ORDER — METRONIDAZOLE 500 MG/1
500 TABLET ORAL 2 TIMES DAILY
Qty: 14 TAB | Refills: 0 | Status: SHIPPED | OUTPATIENT
Start: 2019-04-07 | End: 2019-04-14

## 2019-05-11 ENCOUNTER — HOSPITAL ENCOUNTER (EMERGENCY)
Facility: MEDICAL CENTER | Age: 39
End: 2019-05-11
Attending: EMERGENCY MEDICINE
Payer: COMMERCIAL

## 2019-05-11 VITALS
DIASTOLIC BLOOD PRESSURE: 77 MMHG | HEIGHT: 67 IN | HEART RATE: 70 BPM | TEMPERATURE: 98.4 F | OXYGEN SATURATION: 92 % | SYSTOLIC BLOOD PRESSURE: 119 MMHG | BODY MASS INDEX: 25.26 KG/M2 | RESPIRATION RATE: 18 BRPM | WEIGHT: 160.94 LBS

## 2019-05-11 DIAGNOSIS — N76.1 CHRONIC VAGINITIS: ICD-10-CM

## 2019-05-11 LAB
APPEARANCE UR: CLEAR
BACTERIA GENITAL QL WET PREP: NORMAL
BILIRUB UR QL STRIP.AUTO: NEGATIVE
COLOR UR: YELLOW
GLUCOSE UR STRIP.AUTO-MCNC: NEGATIVE MG/DL
KETONES UR STRIP.AUTO-MCNC: NEGATIVE MG/DL
LEUKOCYTE ESTERASE UR QL STRIP.AUTO: NEGATIVE
MICRO URNS: NORMAL
NITRITE UR QL STRIP.AUTO: NEGATIVE
PH UR STRIP.AUTO: 5.5 [PH]
PROT UR QL STRIP: NEGATIVE MG/DL
RBC UR QL AUTO: NEGATIVE
SIGNIFICANT IND 70042: NORMAL
SITE SITE: NORMAL
SOURCE SOURCE: NORMAL
SP GR UR STRIP.AUTO: 1.02

## 2019-05-11 PROCEDURE — 87491 CHLMYD TRACH DNA AMP PROBE: CPT

## 2019-05-11 PROCEDURE — 87591 N.GONORRHOEAE DNA AMP PROB: CPT

## 2019-05-11 PROCEDURE — 81003 URINALYSIS AUTO W/O SCOPE: CPT

## 2019-05-11 PROCEDURE — 99284 EMERGENCY DEPT VISIT MOD MDM: CPT

## 2019-05-11 RX ORDER — CLINDAMYCIN PHOSPHATE 20 MG/G
CREAM VAGINAL
Qty: 1 TUBE | Refills: 0 | Status: SHIPPED | OUTPATIENT
Start: 2019-05-11 | End: 2019-05-26 | Stop reason: SDUPTHER

## 2019-05-12 NOTE — ED NOTES
"Pt presents with a medical history significant for the following conditions:  Anxiety; Anxiety and depression (06/12/2018); Back pain; Bowel habit changes (06/12/2018); Bronchitis; Depression; Fibromyalgia; Hayfever (06/12/2018); Indigestion (06/12/2018); Insomnia; Muscle spasm (06/12/2018); Neck pain; Panic attacks; Shoulder pain; Stress incontinence (06/12/2018); and TMJ syndrome.  Today she C/O recurring bacterial vaginosis persisting for the past 7 months.   Chief Complaint   Patient presents with   • Other     /77   Pulse 77   Temp 36.7 °C (98.1 °F) (Temporal)   Resp 18   Ht 1.702 m (5' 7\")   Wt 73 kg (160 lb 15 oz)   LMP 04/27/2019 (Approximate)   SpO2 98%   BMI 25.21 kg/m²     "

## 2019-05-12 NOTE — ED PROVIDER NOTES
"ED Provider Note    CHIEF COMPLAINT  Chief Complaint   Patient presents with   • Other       HPI  Florencia Lau is a 39 y.o. female who presents to the emergency department complaining of recurrent vaginal itching and a burning discomfort.  She is been having episodes like this on and off since October and says that she is taken multiple antibiotics and other therapies without improvement.  She was recently treated for Gardnerella.  The patient continues to have symptoms so she is come to the emergency department for evaluation.  She also is just finished an 8-day course of Monistat cream.  The patient says she is only occasionally sexually active with her boyfriend and does not feel that he currently has an infection or is symptomatic.    REVIEW OF SYSTEMS no fever or chills no abdominal pain.  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 disorder\"   • Neck pain    • Panic attacks    • Shoulder pain    • Stress incontinence 06/12/2018   • TMJ syndrome        FAMILY HISTORY  Family History   Problem Relation Age of Onset   • Diabetes Unknown    • Allergies Unknown         RA   • Other Unknown         DIVERTICULITIS, LUPUS, DDD, FM       SOCIAL HISTORY  Social History     Social History   • Marital status: Single     Spouse name: N/A   • Number of children: N/A   • Years of education: N/A     Social History Main Topics   • Smoking status: Current Every Day Smoker     Packs/day: 0.50     Types: Cigarettes   • Smokeless tobacco: Never Used      Comment: 1 PK per day   • Alcohol use No   • Drug use: No   • Sexual activity: Not on file " "    Other Topics Concern   • Not on file     Social History Narrative   • No narrative on file       SURGICAL HISTORY  Past Surgical History:   Procedure Laterality Date   • ANKLE ARTHROSCOPY Right 12/18/2018    Procedure: ANKLE ARTHROSCOPY;  Surgeon: Jose Ruiz M.D.;  Location: SURGERY [facility];  Service: Orthopedics   • LIGAMENT REPAIR Right 12/18/2018    Procedure: LIGAMENT REPAIR- ANTERIOR TALOFIBULAR/ CALCANEOFIBULAR, POSTERIOR TIBIALIS REPAIR;  Surgeon: Jose Ruiz M.D.;  Location: SURGERY [facility];  Service: Orthopedics   • IRRIGATION & DEBRIDEMENT ORTHO Right 12/18/2018    Procedure: IRRIGATION & DEBRIDEMENT ORTHO-POSTERIOR TIBIALIS DEBRIDEMENT;  Surgeon: Jose Ruiz M.D.;  Location: SURGERY [facility];  Service: Orthopedics   • ANKLE ORIF Left 9/2/2018    Procedure: ANKLE ORIF;  Surgeon: Dayday Jaramillo M.D.;  Location: SURGERY [facility];  Service: Orthopedics   • BLADDER SUSPENSION  6/27/2018    Procedure: BLADDER SUSPENSION-   FOR: TRANS OBTURATOR TAPE;  Surgeon: Chapin Banks M.D.;  Location: SURGERY [facility] DAY [facility];  Service: Gynecology   • OTHER      neck abscess   • TONSILLECTOMY     • TUBAL LIGATION         CURRENT MEDICATIONS  Home Medications    **Home medications have not yet been reviewed for this encounter**         ALLERGIES  Allergies   Allergen Reactions   • Latex Rash   • Sulfa Drugs Unspecified     \"Muscle spasm\".       • Codeine Unspecified     \"Childhood allergie, not sure what happens\".     • Morphine Itching       PHYSICAL EXAM  VITAL SIGNS: /77   Pulse 77   Temp 36.7 °C (98.1 °F) (Temporal)   Resp 18   Ht 1.702 m (5' 7\")   Wt 73 kg (160 lb 15 oz)   LMP 04/27/2019 (Approximate)   SpO2 98%   BMI 25.21 kg/m²    Oxygen saturation is interpreted as adequate  Constitutional: Awake anxious otherwise nontoxic-appearing  HENT: Mucous membranes are moist  Eyes: No erythema discharge or jaundice  Neck: Trachea midline no " JVD  Cardiovascular: Regular rate and rhythm  Lungs: Clear and equal bilaterally  Abdomen/Back: Soft nontender nondistended no rebound guarding or peritoneal findings no tenderness with percussion of the flanks.  A pelvic examination was done with a nurse chaperone at the bedside I do not see any abnormal lesions or erythema or swelling and there is essentially no discharge or bleeding.  GC chlamydia and wet prep studies were sent to the laboratory  Skin: Warm and dry  Musculoskeletal: No acute bony deformity  Neurologic: Awake verbal and ambulatory    CHART REVIEW  On April 4, 2018 the patient was positive for Gardnerella vaginalis and negative for gonorrhea and chlamydia    Laboratory  Today wet prep shows a few white blood cells no yeast no trichomonas no clue cells and GC and Chlamydia testing was sent and is pending we will not have that result today.  Urinalysis is negative for nitrite leukocyte esterase and blood    MEDICAL DECISION MAKING and DISPOSITION  I reviewed all the findings with the patient and essentially her exam is normal although she complains that she is symptomatic and would prefer additional treatment for Gardnerella.  I will place her on a 7-day course of clindamycin vaginal cream and I have advised the patient that she really needs to get a gynecologist and arrange office follow-up with her gynecologist as soon as possible.  There is no gynecologist on call for this emergency department today so I cannot arrange follow-up for her.  If she feels she is having new or worsening symptoms she is to go to [facility] emergency department on [facility] because of the availability of gynecology consultation at that facility    IMPRESSION  1.  Chronic vaginitis of unknown etiology         Electronically signed by: Favian Carbajal, 5/11/2019 7:37 PM

## 2019-05-12 NOTE — DISCHARGE INSTRUCTIONS
You need to establish care with a gynecologist as soon as possible.  There is no gynecologist on call at this hospital at this time.  If you feel you are having new or worsening symptoms go to [facility] emergency department on [facility].

## 2019-05-12 NOTE — ED NOTES
Patient understands discharge instructions. Will follow up with her MD as needed and will attempt to find a new GYN. Patient understands use of vaginal cream.

## 2019-05-12 NOTE — ED NOTES
Patient states her GYN surgeon retired and has not been able to obtain another. Has discharge which is milky white with no smell

## 2019-05-12 NOTE — ED NOTES
Patient has had repeated vaginal infections for the last several months. Has been on multiple rounds of antibiotics has been using monostat for 8 days and nothing is better. Had a bladder sling surgery June 2018

## 2019-05-26 ENCOUNTER — HOSPITAL ENCOUNTER (OUTPATIENT)
Facility: MEDICAL CENTER | Age: 39
End: 2019-05-26
Attending: PHYSICIAN ASSISTANT
Payer: COMMERCIAL

## 2019-05-26 ENCOUNTER — OFFICE VISIT (OUTPATIENT)
Dept: URGENT CARE | Facility: CLINIC | Age: 39
End: 2019-05-26
Payer: COMMERCIAL

## 2019-05-26 VITALS
DIASTOLIC BLOOD PRESSURE: 80 MMHG | RESPIRATION RATE: 12 BRPM | WEIGHT: 170 LBS | SYSTOLIC BLOOD PRESSURE: 116 MMHG | BODY MASS INDEX: 26.68 KG/M2 | HEIGHT: 67 IN | OXYGEN SATURATION: 100 % | TEMPERATURE: 98.2 F | HEART RATE: 96 BPM

## 2019-05-26 DIAGNOSIS — N76.1 CHRONIC VAGINITIS: ICD-10-CM

## 2019-05-26 DIAGNOSIS — Z86.19 HISTORY OF HERPES GENITALIS: ICD-10-CM

## 2019-05-26 PROCEDURE — 87510 GARDNER VAG DNA DIR PROBE: CPT

## 2019-05-26 PROCEDURE — 87480 CANDIDA DNA DIR PROBE: CPT

## 2019-05-26 PROCEDURE — 99214 OFFICE O/P EST MOD 30 MIN: CPT | Performed by: PHYSICIAN ASSISTANT

## 2019-05-26 PROCEDURE — 87529 HSV DNA AMP PROBE: CPT | Mod: 91

## 2019-05-26 PROCEDURE — 87660 TRICHOMONAS VAGIN DIR PROBE: CPT

## 2019-05-26 RX ORDER — VALACYCLOVIR HYDROCHLORIDE 1 G/1
1000 TABLET, FILM COATED ORAL 3 TIMES DAILY
Qty: 21 TAB | Refills: 0 | Status: SHIPPED | OUTPATIENT
Start: 2019-05-26 | End: 2019-06-02

## 2019-05-26 RX ORDER — CLINDAMYCIN PHOSPHATE 20 MG/G
CREAM VAGINAL
Qty: 1 TUBE | Refills: 0 | Status: ON HOLD | OUTPATIENT
Start: 2019-05-26 | End: 2019-06-18

## 2019-05-26 ASSESSMENT — ENCOUNTER SYMPTOMS
FLANK PAIN: 0
FEVER: 0
VAGINITIS: 1
CHILLS: 0
BACK PAIN: 0
COUGH: 0
PALPITATIONS: 0
SHORTNESS OF BREATH: 0

## 2019-05-27 NOTE — PROGRESS NOTES
Subjective:      Florencia Lau is a 39 y.o. female who presents with Vaginal Discharge (and pain)            Vaginitis   The patient's primary symptoms include genital itching, genital lesions, a genital odor, a genital rash and vaginal discharge. This is a chronic problem. The current episode started more than 1 month ago. The problem has been waxing and waning. Pertinent negatives include no back pain, chills, dysuria, fever, flank pain, frequency, hematuria or urgency. The vaginal discharge was grey and green. There has been no bleeding. She has not been passing clots. She has not been passing tissue. The symptoms are aggravated by intercourse. She is not sexually active.       Review of Systems   Constitutional: Negative for chills and fever.   Respiratory: Negative for cough and shortness of breath.    Cardiovascular: Negative for chest pain and palpitations.   Genitourinary: Positive for vaginal discharge. Negative for dysuria, flank pain, frequency, hematuria and urgency.        Vaginal discharge     Musculoskeletal: Negative for back pain.   All other systems reviewed and are negative.    PMH:  has a past medical history of Anxiety; Anxiety and depression (06/12/2018); Back pain; Bowel habit changes (06/12/2018); Bronchitis; Depression; Fibromyalgia; Hayfever (06/12/2018); Indigestion (06/12/2018); Insomnia; Muscle spasm (06/12/2018); Neck pain; Panic attacks; Shoulder pain; Stress incontinence (06/12/2018); and TMJ syndrome.  MEDS:   Current Outpatient Prescriptions:   •  clindamycin (CLEOCIN) 2 % vaginal cream, Apply 1 applicator vaginally at bedtime for the next 7 days, Disp: 1 Tube, Rfl: 0  •  valacyclovir (VALTREX) 1 GM Tab, Take 1 Tab by mouth 3 times a day for 7 days., Disp: 21 Tab, Rfl: 0  •  fluconazole (DIFLUCAN) 150 MG tablet, Take 1 tablet by mouth. If symptoms persist after 72 hours, may take second tablet., Disp: 2 Tab, Rfl: 0  •  phenazopyridine (PYRIDIUM) 200 MG Tab, Take 1 Tab by  "mouth 3 times a day as needed., Disp: 6 Tab, Rfl: 0  •  naproxen (NAPROSYN) 500 MG Tab, Take 500 mg by mouth 2 times a day, with meals., Disp: , Rfl:   •  cyclobenzaprine (FLEXERIL) 10 MG Tab, Take 10 mg by mouth at bedtime as needed., Disp: , Rfl:   •  [employer] D3 1000 units Cap, Take 1 Cap by mouth every day., Disp: , Rfl: 5  •  gabapentin (NEURONTIN) 600 MG tablet, Take 600 mg by mouth 4 times a day., Disp: , Rfl:   •  traZODone (DESYREL) 150 MG Tab, Take  mg by mouth every evening., Disp: , Rfl:   •  venlafaxine (EFFEXOR XR) 150 MG extended-release capsule, Take 150 mg by mouth every day., Disp: , Rfl:   •  lamoTRIgine (LAMICTAL) 100 MG Tab, Take 100 mg by mouth every day., Disp: , Rfl:   ALLERGIES:   Allergies   Allergen Reactions   • Latex Rash   • Sulfa Drugs Unspecified     \"Muscle spasm\".       • Codeine Unspecified     \"Childhood allergie, not sure what happens\".     • Morphine Itching     SURGHX:   Past Surgical History:   Procedure Laterality Date   • ANKLE ARTHROSCOPY Right 12/18/2018    Procedure: ANKLE ARTHROSCOPY;  Surgeon: Jose Ruiz M.D.;  Location: [facility];  Service: Orthopedics   • LIGAMENT REPAIR Right 12/18/2018    Procedure: LIGAMENT REPAIR- ANTERIOR TALOFIBULAR/ CALCANEOFIBULAR, POSTERIOR TIBIALIS REPAIR;  Surgeon: Jose Ruiz M.D.;  Location: [facility];  Service: Orthopedics   • IRRIGATION & DEBRIDEMENT ORTHO Right 12/18/2018    Procedure: IRRIGATION & DEBRIDEMENT ORTHO-POSTERIOR TIBIALIS DEBRIDEMENT;  Surgeon: Jose Ruiz M.D.;  Location: SURGERY [facility];  Service: Orthopedics   • ANKLE ORIF Left 9/2/2018    Procedure: ANKLE ORIF;  Surgeon: Dayday Jaramillo M.D.;  Location: [facility];  Service: Orthopedics   • BLADDER SUSPENSION  6/27/2018    Procedure: BLADDER SUSPENSION-   FOR: TRANS OBTURATOR TAPE;  Surgeon: Chapin Banks M.D.;  Location: SURGERY SAME DAY [facility];  Service: Gynecology   • OTHER      neck " "abscess   • TONSILLECTOMY     • TUBAL LIGATION       SOCHX:  reports that she has been smoking Cigarettes.  She has been smoking about 0.50 packs per day. She has never used smokeless tobacco. She reports that she does not drink alcohol or use drugs.  FH: Family history was reviewed, no pertinent findings to report    Medications, Allergies, and current problem list reviewed today in Epic       Objective:     /80 (BP Location: Left arm, Patient Position: Sitting, BP Cuff Size: Adult)   Pulse 96   Temp 36.8 °C (98.2 °F)   Resp 12   Ht 1.702 m (5' 7\")   Wt 77.1 kg (170 lb)   LMP 04/27/2019 (Approximate)   SpO2 100%   BMI 26.63 kg/m²      Physical Exam   Constitutional: She is oriented to person, place, and time. She appears well-developed and well-nourished.   HENT:   Head: Normocephalic and atraumatic.   Right Ear: External ear normal.   Left Ear: External ear normal.   Nose: Nose normal.   Mouth/Throat: Oropharynx is clear and moist.   Neck: Normal range of motion. Neck supple.   Cardiovascular: Normal rate, regular rhythm and normal heart sounds.    Pulmonary/Chest: Effort normal and breath sounds normal.   Abdominal: Soft.   Genitourinary: Vaginal discharge found.   Genitourinary Comments: Grey/green discharge.  Small ulceration on labia RIght.   Musculoskeletal: She exhibits no tenderness.   No CVA tenderness present.   Neurological: She is alert and oriented to person, place, and time.   Skin: Skin is warm and dry.   Psychiatric: She has a normal mood and affect. Her behavior is normal. Judgment and thought content normal.   Vitals reviewed.              Assessment/Plan:     1. Chronic vaginitis    - clindamycin (CLEOCIN) 2 % vaginal cream; Apply 1 applicator vaginally at bedtime for the next 7 days  Dispense: 1 Tube; Refill: 0  - HSV 1/2 By PCR(Herpes)+[id number]; Future  - VAGINAL PATHOGENS DNA PANEL; Future    2. History of herpes genitalis    - valacyclovir (VALTREX) 1 GM Tab; Take 1 Tab by mouth 3 " times a day for 7 days.  Dispense: 21 Tab; Refill: 0  - HSV 1/2 By PCR(Herpes)+[id number]; Future  - VAGINAL PATHOGENS DNA PANEL; Future    Differential diagnosis, natural history, supportive care discussed. Follow-up with primary care provider within 7-10 days, emergency room precautions discussed.  Patient and/or family appears understanding of information.  Handout and review of patients diagnosis and treatment was discussed extensively.

## 2019-05-28 DIAGNOSIS — N76.1 CHRONIC VAGINITIS: ICD-10-CM

## 2019-05-28 DIAGNOSIS — Z86.19 HISTORY OF HERPES GENITALIS: ICD-10-CM

## 2019-05-28 LAB
CANDIDA DNA VAG QL PROBE+SIG AMP: NEGATIVE
G VAGINALIS DNA VAG QL PROBE+SIG AMP: NEGATIVE
T VAGINALIS DNA VAG QL PROBE+SIG AMP: NEGATIVE

## 2019-05-29 LAB
HSV1 DNA SPEC QL NAA+PROBE: NEGATIVE
HSV2 DNA SPEC QL NAA+PROBE: NEGATIVE
SPECIMEN SOURCE: NORMAL

## 2019-06-08 ENCOUNTER — HOSPITAL ENCOUNTER (OUTPATIENT)
Dept: LAB | Facility: MEDICAL CENTER | Age: 39
End: 2019-06-08
Attending: FAMILY MEDICINE
Payer: COMMERCIAL

## 2019-06-08 LAB
ALBUMIN SERPL BCP-MCNC: 4.1 G/DL (ref 3.2–4.9)
ALBUMIN/GLOB SERPL: 1.6 G/DL
ALP SERPL-CCNC: 57 U/L (ref 30–99)
ALT SERPL-CCNC: 10 U/L (ref 2–50)
ANION GAP SERPL CALC-SCNC: 7 MMOL/L (ref 0–11.9)
AST SERPL-CCNC: 15 U/L (ref 12–45)
BASOPHILS # BLD AUTO: 0.7 % (ref 0–1.8)
BASOPHILS # BLD: 0.04 K/UL (ref 0–0.12)
BILIRUB SERPL-MCNC: 0.2 MG/DL (ref 0.1–1.5)
BUN SERPL-MCNC: 14 MG/DL (ref 8–22)
CALCIUM SERPL-MCNC: 8.9 MG/DL (ref 8.5–10.5)
CHLORIDE SERPL-SCNC: 109 MMOL/L (ref 96–112)
CHOLEST SERPL-MCNC: 167 MG/DL (ref 100–199)
CK SERPL-CCNC: 50 U/L (ref 0–154)
CO2 SERPL-SCNC: 24 MMOL/L (ref 20–33)
CREAT SERPL-MCNC: 0.71 MG/DL (ref 0.5–1.4)
EOSINOPHIL # BLD AUTO: 0.28 K/UL (ref 0–0.51)
EOSINOPHIL NFR BLD: 5 % (ref 0–6.9)
ERYTHROCYTE [DISTWIDTH] IN BLOOD BY AUTOMATED COUNT: 44.4 FL (ref 35.9–50)
FASTING STATUS PATIENT QL REPORTED: NORMAL
GLOBULIN SER CALC-MCNC: 2.6 G/DL (ref 1.9–3.5)
GLUCOSE SERPL-MCNC: 91 MG/DL (ref 65–99)
HCT VFR BLD AUTO: 42.7 % (ref 37–47)
HDLC SERPL-MCNC: 37 MG/DL
HGB BLD-MCNC: 14 G/DL (ref 12–16)
IMM GRANULOCYTES # BLD AUTO: 0.02 K/UL (ref 0–0.11)
IMM GRANULOCYTES NFR BLD AUTO: 0.4 % (ref 0–0.9)
LDLC SERPL CALC-MCNC: 114 MG/DL
LYMPHOCYTES # BLD AUTO: 1.66 K/UL (ref 1–4.8)
LYMPHOCYTES NFR BLD: 29.7 % (ref 22–41)
MCH RBC QN AUTO: 30.6 PG (ref 27–33)
MCHC RBC AUTO-ENTMCNC: 32.8 G/DL (ref 33.6–35)
MCV RBC AUTO: 93.2 FL (ref 81.4–97.8)
MONOCYTES # BLD AUTO: 0.31 K/UL (ref 0–0.85)
MONOCYTES NFR BLD AUTO: 5.5 % (ref 0–13.4)
NEUTROPHILS # BLD AUTO: 3.28 K/UL (ref 2–7.15)
NEUTROPHILS NFR BLD: 58.7 % (ref 44–72)
NRBC # BLD AUTO: 0 K/UL
NRBC BLD-RTO: 0 /100 WBC
PLATELET # BLD AUTO: 272 K/UL (ref 164–446)
PMV BLD AUTO: 11.2 FL (ref 9–12.9)
POTASSIUM SERPL-SCNC: 4.4 MMOL/L (ref 3.6–5.5)
PROT SERPL-MCNC: 6.7 G/DL (ref 6–8.2)
RBC # BLD AUTO: 4.58 M/UL (ref 4.2–5.4)
RHEUMATOID FACT SER IA-ACNC: <10 IU/ML (ref 0–14)
SODIUM SERPL-SCNC: 140 MMOL/L (ref 135–145)
TRIGL SERPL-MCNC: 82 MG/DL (ref 0–149)
TSH SERPL DL<=0.005 MIU/L-ACNC: 1.09 UIU/ML (ref 0.38–5.33)
WBC # BLD AUTO: 5.6 K/UL (ref 4.8–10.8)

## 2019-06-08 PROCEDURE — 36415 COLL VENOUS BLD VENIPUNCTURE: CPT

## 2019-06-08 PROCEDURE — 86038 ANTINUCLEAR ANTIBODIES: CPT

## 2019-06-08 PROCEDURE — 80053 COMPREHEN METABOLIC PANEL: CPT

## 2019-06-08 PROCEDURE — 86431 RHEUMATOID FACTOR QUANT: CPT

## 2019-06-08 PROCEDURE — 85025 COMPLETE CBC W/AUTO DIFF WBC: CPT

## 2019-06-08 PROCEDURE — 84443 ASSAY THYROID STIM HORMONE: CPT

## 2019-06-08 PROCEDURE — 82550 ASSAY OF CK (CPK): CPT

## 2019-06-08 PROCEDURE — 80061 LIPID PANEL: CPT

## 2019-06-10 LAB — NUCLEAR IGG SER QL IA: NORMAL

## 2019-06-11 ENCOUNTER — PREP FOR PROCEDURE (OUTPATIENT)
Dept: SCHEDULING | Facility: MEDICAL CENTER | Age: 39
End: 2019-06-11

## 2019-06-11 PROBLEM — M25.572 LEFT ANKLE PAIN: Status: ACTIVE | Noted: 2019-06-11

## 2019-06-11 PROBLEM — S82.55XA: Status: ACTIVE | Noted: 2019-06-11

## 2019-06-18 ENCOUNTER — ANESTHESIA EVENT (OUTPATIENT)
Dept: SURGERY | Facility: MEDICAL CENTER | Age: 39
End: 2019-06-18
Payer: COMMERCIAL

## 2019-06-18 ENCOUNTER — HOSPITAL ENCOUNTER (OUTPATIENT)
Facility: MEDICAL CENTER | Age: 39
End: 2019-06-18
Attending: ORTHOPAEDIC SURGERY | Admitting: ORTHOPAEDIC SURGERY
Payer: COMMERCIAL

## 2019-06-18 ENCOUNTER — APPOINTMENT (OUTPATIENT)
Dept: RADIOLOGY | Facility: MEDICAL CENTER | Age: 39
End: 2019-06-18
Attending: ORTHOPAEDIC SURGERY
Payer: COMMERCIAL

## 2019-06-18 ENCOUNTER — ANESTHESIA (OUTPATIENT)
Dept: SURGERY | Facility: MEDICAL CENTER | Age: 39
End: 2019-06-18
Payer: COMMERCIAL

## 2019-06-18 VITALS
HEART RATE: 79 BPM | HEIGHT: 67 IN | WEIGHT: 160.5 LBS | BODY MASS INDEX: 25.19 KG/M2 | RESPIRATION RATE: 16 BRPM | OXYGEN SATURATION: 98 % | TEMPERATURE: 97 F

## 2019-06-18 DIAGNOSIS — M25.572 LEFT ANKLE PAIN: ICD-10-CM

## 2019-06-18 LAB
HCG UR QL: NEGATIVE
SP GR UR REFRACTOMETRY: 1.01

## 2019-06-18 PROCEDURE — 160039 HCHG SURGERY MINUTES - EA ADDL 1 MIN LEVEL 3: Performed by: ORTHOPAEDIC SURGERY

## 2019-06-18 PROCEDURE — 160048 HCHG OR STATISTICAL LEVEL 1-5: Performed by: ORTHOPAEDIC SURGERY

## 2019-06-18 PROCEDURE — 700102 HCHG RX REV CODE 250 W/ 637 OVERRIDE(OP): Performed by: ANESTHESIOLOGY

## 2019-06-18 PROCEDURE — 700105 HCHG RX REV CODE 258: Performed by: ORTHOPAEDIC SURGERY

## 2019-06-18 PROCEDURE — 160028 HCHG SURGERY MINUTES - 1ST 30 MINS LEVEL 3: Performed by: ORTHOPAEDIC SURGERY

## 2019-06-18 PROCEDURE — 160035 HCHG PACU - 1ST 60 MINS PHASE I: Performed by: ORTHOPAEDIC SURGERY

## 2019-06-18 PROCEDURE — 700101 HCHG RX REV CODE 250: Performed by: ANESTHESIOLOGY

## 2019-06-18 PROCEDURE — 81025 URINE PREGNANCY TEST: CPT

## 2019-06-18 PROCEDURE — 160046 HCHG PACU - 1ST 60 MINS PHASE II: Performed by: ORTHOPAEDIC SURGERY

## 2019-06-18 PROCEDURE — 160009 HCHG ANES TIME/MIN: Performed by: ORTHOPAEDIC SURGERY

## 2019-06-18 PROCEDURE — A6223 GAUZE >16<=48 NO W/SAL W/O B: HCPCS | Performed by: ORTHOPAEDIC SURGERY

## 2019-06-18 PROCEDURE — 700101 HCHG RX REV CODE 250: Performed by: ORTHOPAEDIC SURGERY

## 2019-06-18 PROCEDURE — A6222 GAUZE <=16 IN NO W/SAL W/O B: HCPCS | Performed by: ORTHOPAEDIC SURGERY

## 2019-06-18 PROCEDURE — 700105 HCHG RX REV CODE 258: Performed by: ANESTHESIOLOGY

## 2019-06-18 PROCEDURE — 501838 HCHG SUTURE GENERAL: Performed by: ORTHOPAEDIC SURGERY

## 2019-06-18 PROCEDURE — 73600 X-RAY EXAM OF ANKLE: CPT | Mod: LT

## 2019-06-18 PROCEDURE — 160002 HCHG RECOVERY MINUTES (STAT): Performed by: ORTHOPAEDIC SURGERY

## 2019-06-18 PROCEDURE — A9270 NON-COVERED ITEM OR SERVICE: HCPCS | Performed by: ANESTHESIOLOGY

## 2019-06-18 PROCEDURE — 500881 HCHG PACK, EXTREMITY: Performed by: ORTHOPAEDIC SURGERY

## 2019-06-18 PROCEDURE — 160025 RECOVERY II MINUTES (STATS): Performed by: ORTHOPAEDIC SURGERY

## 2019-06-18 PROCEDURE — 700111 HCHG RX REV CODE 636 W/ 250 OVERRIDE (IP): Performed by: ANESTHESIOLOGY

## 2019-06-18 RX ORDER — OXYCODONE HCL 5 MG/5 ML
5 SOLUTION, ORAL ORAL
Status: COMPLETED | OUTPATIENT
Start: 2019-06-18 | End: 2019-06-18

## 2019-06-18 RX ORDER — IPRATROPIUM BROMIDE AND ALBUTEROL SULFATE 2.5; .5 MG/3ML; MG/3ML
3 SOLUTION RESPIRATORY (INHALATION)
Status: DISCONTINUED | OUTPATIENT
Start: 2019-06-18 | End: 2019-06-18 | Stop reason: HOSPADM

## 2019-06-18 RX ORDER — HYDROMORPHONE HYDROCHLORIDE 1 MG/ML
1 INJECTION, SOLUTION INTRAMUSCULAR; INTRAVENOUS; SUBCUTANEOUS
Status: DISCONTINUED | OUTPATIENT
Start: 2019-06-18 | End: 2019-06-18 | Stop reason: HOSPADM

## 2019-06-18 RX ORDER — TIZANIDINE 4 MG/1
TABLET ORAL
Refills: 2 | Status: ON HOLD | COMMUNITY
Start: 2019-05-08 | End: 2019-06-18

## 2019-06-18 RX ORDER — SODIUM CHLORIDE, SODIUM GLUCONATE, SODIUM ACETATE, POTASSIUM CHLORIDE AND MAGNESIUM CHLORIDE 526; 502; 368; 37; 30 MG/100ML; MG/100ML; MG/100ML; MG/100ML; MG/100ML
500 INJECTION, SOLUTION INTRAVENOUS CONTINUOUS
Status: DISCONTINUED | OUTPATIENT
Start: 2019-06-18 | End: 2019-06-18 | Stop reason: HOSPADM

## 2019-06-18 RX ORDER — CEFAZOLIN SODIUM 1 G/3ML
INJECTION, POWDER, FOR SOLUTION INTRAMUSCULAR; INTRAVENOUS PRN
Status: DISCONTINUED | OUTPATIENT
Start: 2019-06-18 | End: 2019-06-18 | Stop reason: SURG

## 2019-06-18 RX ORDER — OXYCODONE HCL 5 MG/5 ML
10 SOLUTION, ORAL ORAL
Status: COMPLETED | OUTPATIENT
Start: 2019-06-18 | End: 2019-06-18

## 2019-06-18 RX ORDER — KETOROLAC TROMETHAMINE 30 MG/ML
INJECTION, SOLUTION INTRAMUSCULAR; INTRAVENOUS PRN
Status: DISCONTINUED | OUTPATIENT
Start: 2019-06-18 | End: 2019-06-18 | Stop reason: SURG

## 2019-06-18 RX ORDER — HYDROMORPHONE HYDROCHLORIDE 1 MG/ML
0.2 INJECTION, SOLUTION INTRAMUSCULAR; INTRAVENOUS; SUBCUTANEOUS
Status: DISCONTINUED | OUTPATIENT
Start: 2019-06-18 | End: 2019-06-18 | Stop reason: HOSPADM

## 2019-06-18 RX ORDER — MEPERIDINE HYDROCHLORIDE 25 MG/ML
12.5 INJECTION INTRAMUSCULAR; INTRAVENOUS; SUBCUTANEOUS
Status: DISCONTINUED | OUTPATIENT
Start: 2019-06-18 | End: 2019-06-18 | Stop reason: HOSPADM

## 2019-06-18 RX ORDER — ONDANSETRON 2 MG/ML
INJECTION INTRAMUSCULAR; INTRAVENOUS PRN
Status: DISCONTINUED | OUTPATIENT
Start: 2019-06-18 | End: 2019-06-18 | Stop reason: SURG

## 2019-06-18 RX ORDER — ONDANSETRON 2 MG/ML
4 INJECTION INTRAMUSCULAR; INTRAVENOUS
Status: DISCONTINUED | OUTPATIENT
Start: 2019-06-18 | End: 2019-06-18 | Stop reason: HOSPADM

## 2019-06-18 RX ORDER — MIDAZOLAM HYDROCHLORIDE 1 MG/ML
1 INJECTION INTRAMUSCULAR; INTRAVENOUS
Status: DISCONTINUED | OUTPATIENT
Start: 2019-06-18 | End: 2019-06-18 | Stop reason: HOSPADM

## 2019-06-18 RX ORDER — BUPIVACAINE HYDROCHLORIDE 5 MG/ML
INJECTION, SOLUTION EPIDURAL; INTRACAUDAL
Status: DISCONTINUED | OUTPATIENT
Start: 2019-06-18 | End: 2019-06-18 | Stop reason: HOSPADM

## 2019-06-18 RX ORDER — TIZANIDINE 4 MG/1
4-8 TABLET ORAL
COMMUNITY
End: 2021-06-08

## 2019-06-18 RX ORDER — SODIUM CHLORIDE, SODIUM LACTATE, POTASSIUM CHLORIDE, CALCIUM CHLORIDE 600; 310; 30; 20 MG/100ML; MG/100ML; MG/100ML; MG/100ML
INJECTION, SOLUTION INTRAVENOUS CONTINUOUS
Status: DISCONTINUED | OUTPATIENT
Start: 2019-06-18 | End: 2019-06-18 | Stop reason: HOSPADM

## 2019-06-18 RX ORDER — HYDROMORPHONE HYDROCHLORIDE 1 MG/ML
0.4 INJECTION, SOLUTION INTRAMUSCULAR; INTRAVENOUS; SUBCUTANEOUS
Status: DISCONTINUED | OUTPATIENT
Start: 2019-06-18 | End: 2019-06-18 | Stop reason: HOSPADM

## 2019-06-18 RX ORDER — DEXAMETHASONE SODIUM PHOSPHATE 4 MG/ML
INJECTION, SOLUTION INTRA-ARTICULAR; INTRALESIONAL; INTRAMUSCULAR; INTRAVENOUS; SOFT TISSUE PRN
Status: DISCONTINUED | OUTPATIENT
Start: 2019-06-18 | End: 2019-06-18 | Stop reason: SURG

## 2019-06-18 RX ORDER — SODIUM CHLORIDE, SODIUM LACTATE, POTASSIUM CHLORIDE, CALCIUM CHLORIDE 600; 310; 30; 20 MG/100ML; MG/100ML; MG/100ML; MG/100ML
INJECTION, SOLUTION INTRAVENOUS
Status: DISCONTINUED | OUTPATIENT
Start: 2019-06-18 | End: 2019-06-18 | Stop reason: SURG

## 2019-06-18 RX ORDER — DIPHENHYDRAMINE HYDROCHLORIDE 50 MG/ML
12.5 INJECTION INTRAMUSCULAR; INTRAVENOUS
Status: DISCONTINUED | OUTPATIENT
Start: 2019-06-18 | End: 2019-06-18 | Stop reason: HOSPADM

## 2019-06-18 RX ORDER — HALOPERIDOL 5 MG/ML
1 INJECTION INTRAMUSCULAR
Status: DISCONTINUED | OUTPATIENT
Start: 2019-06-18 | End: 2019-06-18 | Stop reason: HOSPADM

## 2019-06-18 RX ORDER — MAGNESIUM HYDROXIDE 1200 MG/15ML
LIQUID ORAL
Status: COMPLETED | OUTPATIENT
Start: 2019-06-18 | End: 2019-06-18

## 2019-06-18 RX ADMIN — SODIUM CHLORIDE, POTASSIUM CHLORIDE, SODIUM LACTATE AND CALCIUM CHLORIDE: 600; 310; 30; 20 INJECTION, SOLUTION INTRAVENOUS at 07:00

## 2019-06-18 RX ADMIN — LIDOCAINE HYDROCHLORIDE 100 MG: 20 INJECTION, SOLUTION INTRAVENOUS at 07:06

## 2019-06-18 RX ADMIN — GLYCOPYRROLATE 0.3 MG: 0.2 INJECTION INTRAMUSCULAR; INTRAVENOUS at 07:36

## 2019-06-18 RX ADMIN — SODIUM CHLORIDE, POTASSIUM CHLORIDE, SODIUM LACTATE AND CALCIUM CHLORIDE: 600; 310; 30; 20 INJECTION, SOLUTION INTRAVENOUS at 06:11

## 2019-06-18 RX ADMIN — ONDANSETRON 4 MG: 2 INJECTION INTRAMUSCULAR; INTRAVENOUS at 07:35

## 2019-06-18 RX ADMIN — FENTANYL CITRATE 100 MCG: 50 INJECTION, SOLUTION INTRAMUSCULAR; INTRAVENOUS at 07:01

## 2019-06-18 RX ADMIN — EPHEDRINE SULFATE 10 MG: 50 INJECTION INTRAMUSCULAR; INTRAVENOUS; SUBCUTANEOUS at 07:11

## 2019-06-18 RX ADMIN — CEFAZOLIN 2 G: 330 INJECTION, POWDER, FOR SOLUTION INTRAMUSCULAR; INTRAVENOUS at 07:02

## 2019-06-18 RX ADMIN — OXYCODONE HYDROCHLORIDE 10 MG: 5 SOLUTION ORAL at 07:58

## 2019-06-18 RX ADMIN — KETOROLAC TROMETHAMINE 30 MG: 30 INJECTION, SOLUTION INTRAMUSCULAR at 07:35

## 2019-06-18 RX ADMIN — LIDOCAINE HYDROCHLORIDE 0.3 ML: 10 INJECTION, SOLUTION EPIDURAL; INFILTRATION; INTRACAUDAL; PERINEURAL at 06:12

## 2019-06-18 RX ADMIN — DEXAMETHASONE SODIUM PHOSPHATE 8 MG: 4 INJECTION, SOLUTION INTRA-ARTICULAR; INTRALESIONAL; INTRAMUSCULAR; INTRAVENOUS; SOFT TISSUE at 07:09

## 2019-06-18 RX ADMIN — MIDAZOLAM HYDROCHLORIDE 2 MG: 1 INJECTION, SOLUTION INTRAMUSCULAR; INTRAVENOUS at 07:01

## 2019-06-18 RX ADMIN — PROPOFOL 200 MG: 10 INJECTION, EMULSION INTRAVENOUS at 07:06

## 2019-06-18 ASSESSMENT — PAIN SCALES - GENERAL: PAIN_LEVEL: 5

## 2019-06-18 NOTE — OP REPORT
DATE OF SERVICE:  06/18/2019    PREOPERATIVE DIAGNOSES:  1.  Left bimalleolar ankle fracture, status post fixation and healing.  2.  Left fibula painful hardware.    POSTOPERATIVE DIAGNOSES:  1.  Left bimalleolar ankle fracture, status post fixation and healing.  2.  Left fibula painful hardware.    PROCEDURE:  Removal of hardware, left fibula.    SURGEON:  Jose Ruiz MD    ANESTHESIA:  General 30 mL of local 0.5% Marcaine without epinephrine.    ESTIMATED BLOOD LOSS:  5 mL.    TOURNIQUET TIME:  20 minutes at 250 mmHg.    FINDINGS:  Healed fracture.    COMPLICATIONS:  None.    OUTCOME:  To PACU in stable condition.    HISTORY OF PRESENT ILLNESS:  A pleasant 39-year-old female who had a   bimalleolar ankle fracture fixed by my partner.  I have seen her for   persistent ankle pain.  We improved this with an arthroscopic debridement at   that time.  At the time of the debridement, her hardware was not causing her   discomfort.  She is now having some discomfort over her lateral hardware, not   over her medial screw.  She was greeted in the preoperative holding area and   identified by name and medical record number.  Left lower extremity was   marked.  Risks of procedure including bleeding, infection, pain, neurovascular   damage, continuation of symptoms and need for more surgery were discussed.    She provided written consent.    DESCRIPTION OF PROCEDURE:  She was taken to the operating room and placed on   the table in supine position.  Preoperative antibiotics were administered.    General anesthesia was induced.  A nonsterile tourniquet was placed on the   left thigh.  Left lower extremity prepped and draped in usual sterile fashion.    An operative pause was taken where all present were in agreement with   patient identification, laterality and procedure to be performed.  An Esmarch   bandage was used to exsanguinate the limb and the tourniquet was raised to 250   mmHg.  The patient's previous lateral  incision was reopened sharply to plate.    All screws from the plate were removed.  The plate was then able to be   removed.  There was an interfragmentary screw beneath the plate that was also   removed.  The fracture was healed.  A rongeur was used to remove scar tissue.    Each screw tract was curetted with a small curette.  The wound was then   copiously irrigated.  The fascial layer closed with 3-0 Vicryl in   figure-of-eight fashion, subcutaneous layer with 3-0 Vicryl in buried   interrupted fashion, skin closed with 3-0 nylon in horizontal mattress   fashion.  Adaptic gauze and an Ace were placed.  Tourniquet let down for a   total time of 20 minutes.  The patient was awakened and extubated in stable   condition.    POSTOPERATIVE COURSE:  She will be discharged home today assuming adequate   pain control and mobilization, weightbearing as tolerated, left lower   extremity in a Cam walker boot.  I will see her in 10-14 days for suture   removal and wound check.  We will transition her out of her boot at that time.       ____________________________________     MD DEQUAN AU / STEPHEN    DD:  06/18/2019 07:51:39  DT:  06/18/2019 08:00:24    D#:  [id number]  Job#:  [id number]

## 2019-06-18 NOTE — OR SURGEON
Immediate Post OP Note    PreOp Diagnosis: Left bimall ankle fx s/p healing, painful fibular hardware    PostOp Diagnosis: Same    Procedure(s):  HARDWARE REMOVAL ORTHO - FIBULA - Wound Class: Clean    Surgeon(s):  Jose Ruiz M.D.    Anesthesiologist/Type of Anesthesia:  Anesthesiologist: Aleksander Iverson III, M.D./General    Surgical Staff:  Circulator: Eliza Perez R.N.; Lizabeth Camejo R.N.  Scrub Person: Sophia Daniel  Radiology Technologist: Blaze Wong    Specimens removed if any:  * No specimens in log *    Estimated Blood Loss: 5cc    TT: 20 min @ 250mmHg    Findings: Healed fractures    Complications: None        6/18/2019 7:48 AM Jose Ruiz M.D.

## 2019-06-18 NOTE — ANESTHESIA POSTPROCEDURE EVALUATION
Patient: Florencia Lau    Procedure Summary     Date:  06/18/19 Room / Location:  [facility] OR 09 / SURGERY [facility]    Anesthesia Start:  0700 Anesthesia Stop:  0751    Procedure:  HARDWARE REMOVAL ORTHO - FIBULA (Left Ankle) Diagnosis:       Nondisplaced fracture of medial malleolus of left tibia      Left ankle pain      (Left ankle pain)    Surgeon:  Jose Ruiz M.D. Responsible Provider:  Aleksander Iverson III, M.D.    Anesthesia Type:  general ASA Status:  3          Final Anesthesia Type: general  Last vitals  BP   NIBP: 109/55    Temp   36.1 °C (97 °F)    Pulse   Pulse: 61   Resp   16    SpO2   98 %      Anesthesia Post Evaluation    Patient location during evaluation: PACU  Patient participation: complete - patient participated  Level of consciousness: awake and alert  Pain score: 5    Airway patency: patent  Anesthetic complications: no  Cardiovascular status: hemodynamically stable  Respiratory status: acceptable  Hydration status: euvolemic    PONV: none           Nurse Pain Score: 5 (NPRS)

## 2019-06-18 NOTE — ANESTHESIA PROCEDURE NOTES
Airway  Date/Time: 6/18/2019 7:06 AM  Performed by: FLORECITA BAPTISTE  Authorized by: FLORECITA BAPTISTE     Location:  OR  Urgency:  Elective  Difficult Airway: No    Indications for Airway Management:  Anesthesia  Spontaneous Ventilation: absent    Sedation Level:  Deep  Preoxygenated: Yes    Final Airway Type:  Supraglottic airway  Final Supraglottic Airway:  Standard LMA  SGA Size:  3  Number of Attempts at Approach:  1

## 2019-06-18 NOTE — ANESTHESIA QCDR
2019 [facility] Clinical Data Registry (for Quality Improvement)     Postoperative nausea/vomiting risk protocol (Adult = 18 yrs and Pediatric 3-17 yrs)- (430 and 463)  General inhalation anesthetic (NOT TIVA) with PONV risk factors: Yes  Provision of anti-emetic therapy with at least 2 different classes of agents: Yes   Patient DID NOT receive anti-emetic therapy and reason is documented in Medical Record:  N/A    Multimodal Pain Management- (AQI59)  Patient undergoing Elective Surgery (i.e. Outpatient, or ASC, or Prescheduled Surgery prior to Hospital Admission): Yes  Use of Multimodal Pain Management, two or more drugs and/or interventions, NOT including systemic opioids: Yes   Exception: Documented allergy to multiple classes of analgesics:  N/A    PACU assessment of acute postoperative pain prior to Anesthesia Care End- Applies to Patients Age = 18- (ABG7)  Initial PACU pain score is which of the following: < 7/10  Patient unable to report pain score: N/A    Post-anesthetic transfer of care checklist/protocol to PACU/ICU- (426 and 427)  Upon conclusion of case, patient transferred to which of the following locations: PACU/Non-ICU  Use of transfer checklist/protocol: Yes  Exclusion: Service Performed in Patient Hospital Room (and thus did not require transfer): N/A    PACU Reintubation- (AQI31)  General anesthesia requiring endotracheal intubation (ETT) along with subsequent extubation in OR or PACU: No  Required reintubation in the PACU: N/A  Extubation was a planned trial documented in the medical record prior to removal of the original airway device: N/A    Unplanned admission to ICU related to anesthesia service up through end of PACU care- (MD51)  Unplanned admission to ICU (not initially anticipated at anesthesia start time): No

## 2019-06-18 NOTE — DISCHARGE INSTRUCTIONS
ACTIVITY: Rest and take it easy for the first 24 hours.  A responsible adult is recommended to remain with you during that time.  It is normal to feel sleepy.  We encourage you to not do anything that requires balance, judgment or coordination.    MILD FLU-LIKE SYMPTOMS ARE NORMAL. YOU MAY EXPERIENCE GENERALIZED MUSCLE ACHES, THROAT IRRITATION, HEADACHE AND/OR SOME NAUSEA.    FOR 24 HOURS DO NOT:  Drive, operate machinery or run household appliances.  Drink beer or alcoholic beverages.   Make important decisions or sign legal documents.    SPECIAL INSTRUCTIONS:     *may bear weight as tolerate LLE in boot   Keep dressing clean and dry   Ice and elevate   Stay ahead of pain**    DIET: To avoid nausea, slowly advance diet as tolerated, avoiding spicy or greasy foods for the first day.  Add more substantial food to your diet according to your physician's instructions.  Babies can be fed formula or breast milk as soon as they are hungry.  INCREASE FLUIDS AND FIBER TO AVOID CONSTIPATION.        FOLLOW-UP APPOINTMENT:  A follow-up appointment should be arranged with your doctor in; call to schedule.    You should CALL YOUR PHYSICIAN if you develop:  Fever greater than 101 degrees F.  Pain not relieved by medication, or persistent nausea or vomiting.  Excessive bleeding (blood soaking through dressing) or unexpected drainage from the wound.  Extreme redness or swelling around the incision site, drainage of pus or foul smelling drainage.  Inability to urinate or empty your bladder within 8 hours.  Problems with breathing or chest pain.    You should call 911 if you develop problems with breathing or chest pain.  If you are unable to contact your doctor or surgical center, you should go to the nearest emergency room or urgent care center. Dr Ruiz telephone #: *[phone]**    If any questions arise, call your doctor.  If your doctor is not available, please feel free to call the Surgical Center at [phone].  The  Center is open Monday through Friday from 7AM to 7PM.  You can also call the HEALTH HOTLINE open 24 hours/day, 7 days/week and speak to a nurse at [phone], or toll free at [phone].    A registered nurse may call you a few days after your surgery to see how you are doing after your procedure.    MEDICATIONS: Resume taking daily medication.  Take prescribed pain medication with food.  If no medication is prescribed, you may take non-aspirin pain medication if needed.  PAIN MEDICATION CAN BE VERY CONSTIPATING.  Take a stool softener or laxative such as senokot, pericolace, or milk of magnesia if needed.    Prescription given for *has at home**.  Last pain medication given at *0800**.    If your physician has prescribed pain medication that includes Acetaminophen (Tylenol), do not take additional Acetaminophen (Tylenol) while taking the prescribed medication.    Depression / Suicide Risk    As you are discharged from this [facility] facility, it is important to learn how to keep safe from harming yourself.    Recognize the warning signs:  · Abrupt changes in personality, positive or negative- including increase in energy   · Giving away possessions  · Change in eating patterns- significant weight changes-  positive or negative  · Change in sleeping patterns- unable to sleep or sleeping all the time   · Unwillingness or inability to communicate  · Depression  · Unusual sadness, discouragement and loneliness  · Talk of wanting to die  · Neglect of personal appearance   · Rebelliousness- reckless behavior  · Withdrawal from people/activities they love  · Confusion- inability to concentrate     If you or a loved one observes any of these behaviors or has concerns about self-harm, here's what you can do:  · Talk about it- your feelings and reasons for harming yourself  · Remove any means that you might use to hurt yourself (examples: pills, rope, extension cords, firearm)  · Get professional help from the  community (Mental Health, Substance Abuse, psychological counseling)  · Do not be alone:Call your Safe Contact- someone whom you trust who will be there for you.  · Call your local CRISIS HOTLINE 084-6856 or 022-794-1717  · Call your local Children's Mobile Crisis Response Team [place] (005) 018-9704 or www.[employer]  · Call the toll free National Suicide Prevention Hotlines   · National Suicide Prevention Lifeline 802-978-ZAXP (2168)  · National Hope Line Network 800-SUICIDE (114-4819)

## 2019-06-18 NOTE — ANESTHESIA TIME REPORT
Anesthesia Start and Stop Event Times     Date Time Event    6/18/2019 0700 Anesthesia Start     0751 Anesthesia Stop        Responsible Staff  06/18/19    Name Role Begin End    Aleksander Iverson III, M.D. Anesth 0700 0751        Preop Diagnosis (Free Text):  Pre-op Diagnosis     Left ankle pain        Preop Diagnosis (Codes):  Diagnosis Information     Diagnosis Code(s): Nondisplaced fracture of medial malleolus of left tibia [S82.55XA]     Left ankle pain [M25.572]        Post op Diagnosis  Pain in left ankle and joints of left foot  Fibromyalgia    Premium Reason  Non-Premium    Comments:

## 2019-06-18 NOTE — OR NURSING
Patient received from pacu.  Vss. Alert and oriented times three.  Dressing intact. Ice pack in place.  Patient states foot pain tolerable to go home. States that she always has high amt of pain due to fibromyalgia.  Verbalized understanding of dc instructions. Wanting to walk out. Balance steady.  Walked downstairs to  where mom was coming to pick her up.

## 2019-06-18 NOTE — ANESTHESIA PREPROCEDURE EVALUATION
Relevant Problems   (+) Inflammatory arthritis       Physical Exam    Airway   Mallampati: II  TM distance: >3 FB  Neck ROM: full       Cardiovascular - normal exam  Rhythm: regular  Rate: normal  (-) murmur     Dental - normal exam         Pulmonary - normal exam  Breath sounds clear to auscultation     Abdominal    Neurological - normal exam                 Anesthesia Plan    ASA 3       Plan - general       Airway plan will be LMA  (Fibromyalgia)      Induction: intravenous    Postoperative Plan: Postoperative administration of opioids is intended.    Pertinent diagnostic labs and testing reviewed    Informed Consent:    Anesthetic plan and risks discussed with patient.    Use of blood products discussed with: patient whom consented to blood products.

## 2019-10-22 ENCOUNTER — OFFICE VISIT (OUTPATIENT)
Dept: URGENT CARE | Facility: CLINIC | Age: 39
End: 2019-10-22
Payer: COMMERCIAL

## 2019-10-22 VITALS
HEART RATE: 79 BPM | DIASTOLIC BLOOD PRESSURE: 82 MMHG | BODY MASS INDEX: 24.8 KG/M2 | RESPIRATION RATE: 14 BRPM | OXYGEN SATURATION: 96 % | TEMPERATURE: 97.7 F | SYSTOLIC BLOOD PRESSURE: 112 MMHG | HEIGHT: 67 IN | WEIGHT: 158 LBS

## 2019-10-22 DIAGNOSIS — J01.00 ACUTE NON-RECURRENT MAXILLARY SINUSITIS: ICD-10-CM

## 2019-10-22 DIAGNOSIS — R05.9 COUGH: ICD-10-CM

## 2019-10-22 PROCEDURE — 99214 OFFICE O/P EST MOD 30 MIN: CPT | Performed by: PHYSICIAN ASSISTANT

## 2019-10-22 RX ORDER — PREDNISONE 20 MG/1
20 TABLET ORAL DAILY
Qty: 5 TAB | Refills: 0 | Status: SHIPPED | OUTPATIENT
Start: 2019-10-22 | End: 2019-10-27

## 2019-10-22 RX ORDER — BENZONATATE 100 MG/1
100-200 CAPSULE ORAL 3 TIMES DAILY PRN
Qty: 60 CAP | Refills: 0 | Status: SHIPPED | OUTPATIENT
Start: 2019-10-22 | End: 2021-06-08

## 2019-10-22 RX ORDER — DOXYCYCLINE HYCLATE 100 MG
100 TABLET ORAL 2 TIMES DAILY
Qty: 20 TAB | Refills: 0 | Status: SHIPPED | OUTPATIENT
Start: 2019-10-22 | End: 2019-11-01

## 2019-10-22 ASSESSMENT — ENCOUNTER SYMPTOMS
DIZZINESS: 0
SPUTUM PRODUCTION: 1
VOMITING: 0
DIARRHEA: 0
MUSCULOSKELETAL NEGATIVE: 1
SHORTNESS OF BREATH: 0
SORE THROAT: 0
ABDOMINAL PAIN: 0
FEVER: 0
NAUSEA: 0
SINUS PAIN: 1
HEMOPTYSIS: 0
WHEEZING: 0
COUGH: 1
RHINORRHEA: 0
HEADACHES: 1
MYALGIAS: 0
CHILLS: 0

## 2019-10-22 ASSESSMENT — COPD QUESTIONNAIRES: COPD: 0

## 2019-10-23 NOTE — PROGRESS NOTES
"Subjective:      Florencia Lau is a 39 y.o. female who presents with Cough (sob, sinus,ear pain, head aches x2 months Took antibitics that seemed to help but sx didn't go away )            Cough   This is a new problem. The current episode started more than 1 month ago (2 months). The problem has been unchanged. The cough is non-productive. Associated symptoms include headaches, nasal congestion and postnasal drip. Pertinent negatives include no chest pain, chills, ear pain, fever, hemoptysis, myalgias, rash, rhinorrhea, sore throat, shortness of breath or wheezing. Nothing aggravates the symptoms. Risk factors for lung disease include smoking/tobacco exposure. She has tried OTC cough suppressant (antibiotics) for the symptoms. The treatment provided mild relief. There is no history of asthma, COPD or pneumonia.       Review of Systems   Constitutional: Negative for chills and fever.   HENT: Positive for congestion, postnasal drip and sinus pain. Negative for ear pain, rhinorrhea and sore throat.    Respiratory: Positive for cough and sputum production. Negative for hemoptysis, shortness of breath and wheezing.    Cardiovascular: Negative for chest pain.   Gastrointestinal: Negative for abdominal pain, diarrhea, nausea and vomiting.   Genitourinary: Negative.    Musculoskeletal: Negative.  Negative for myalgias.   Skin: Negative for rash.   Neurological: Positive for headaches. Negative for dizziness.          Objective:     /82   Pulse 79   Temp 36.5 °C (97.7 °F)   Resp 14   Ht 1.702 m (5' 7\")   Wt 71.7 kg (158 lb)   SpO2 96%   BMI 24.75 kg/m²      Physical Exam   Constitutional: She is oriented to person, place, and time. She appears well-developed and well-nourished. No distress.   HENT:   Head: Normocephalic and atraumatic.   Right Ear: Hearing, tympanic membrane, external ear and ear canal normal.   Left Ear: Hearing, tympanic membrane, external ear and ear canal normal.   Nose: Mucosal " edema and rhinorrhea present. Right sinus exhibits maxillary sinus tenderness. Right sinus exhibits no frontal sinus tenderness. Left sinus exhibits maxillary sinus tenderness. Left sinus exhibits no frontal sinus tenderness.   Mouth/Throat: Oropharynx is clear and moist. No oropharyngeal exudate, posterior oropharyngeal edema or posterior oropharyngeal erythema.   Eyes: Pupils are equal, round, and reactive to light. Conjunctivae are normal. Right eye exhibits no discharge. Left eye exhibits no discharge.   Neck: Normal range of motion.   Cardiovascular: Normal rate, regular rhythm and normal heart sounds.   No murmur heard.  Pulmonary/Chest: Effort normal. No stridor. No respiratory distress. She has no wheezes.   Musculoskeletal: Normal range of motion.   Lymphadenopathy:     She has no cervical adenopathy.   Neurological: She is alert and oriented to person, place, and time.   Skin: Skin is warm and dry. She is not diaphoretic.   Psychiatric: She has a normal mood and affect. Her behavior is normal.   Nursing note and vitals reviewed.         PMH:  has a past medical history of Anxiety, Anxiety and depression (06/12/2018), Back pain, Bowel habit changes (06/12/2018), Bronchitis, Depression, Fibromyalgia, Hayfever (06/12/2018), Indigestion (06/12/2018), Insomnia, Muscle spasm (06/12/2018), Neck pain, Panic attacks, Shoulder pain, and TMJ syndrome.  MEDS:   Current Outpatient Medications:   •  doxycycline (VIBRAMYCIN) 100 MG Tab, Take 1 Tab by mouth 2 times a day for 10 days., Disp: 20 Tab, Rfl: 0  •  predniSONE (DELTASONE) 20 MG Tab, Take 1 Tab by mouth every day for 5 days., Disp: 5 Tab, Rfl: 0  •  benzonatate (TESSALON) 100 MG Cap, Take 1-2 Caps by mouth 3 times a day as needed., Disp: 60 Cap, Rfl: 0  •  tizanidine (ZANAFLEX) 4 MG Tab, Take 4-8 mg by mouth at bedtime as needed., Disp: , Rfl:   •  naproxen (NAPROSYN) 500 MG Tab, Take 500 mg by mouth 2 times a day, with meals., Disp: , Rfl:   •  gabapentin  "(NEURONTIN) 600 MG tablet, Take 600 mg by mouth 4 times a day., Disp: , Rfl:   •  traZODone (DESYREL) 150 MG Tab, Take  mg by mouth every evening., Disp: , Rfl:   •  venlafaxine (EFFEXOR XR) 150 MG extended-release capsule, Take 150 mg by mouth every day., Disp: , Rfl:   •  lamoTRIgine (LAMICTAL) 100 MG Tab, Take 100 mg by mouth every day., Disp: , Rfl:   •  CVS D3 1000 units Cap, Take 1 Cap by mouth every day., Disp: , Rfl: 5  ALLERGIES:   Allergies   Allergen Reactions   • Latex Rash   • Sulfa Drugs Unspecified     \"Muscle spasm\".       • Codeine Unspecified     \"Childhood allergie, not sure what happens\".     • Morphine Itching     SURGHX:   Past Surgical History:   Procedure Laterality Date   • HARDWARE REMOVAL ORTHO Left 6/18/2019    Procedure: HARDWARE REMOVAL ORTHO - FIBULA;  Surgeon: Jose Ruiz M.D.;  Location: [facility];  Service: Orthopedics   • ANKLE ARTHROSCOPY Right 12/18/2018    Procedure: ANKLE ARTHROSCOPY;  Surgeon: Jose Ruiz M.D.;  Location: [facility];  Service: Orthopedics   • LIGAMENT REPAIR Right 12/18/2018    Procedure: LIGAMENT REPAIR- ANTERIOR TALOFIBULAR/ CALCANEOFIBULAR, POSTERIOR TIBIALIS REPAIR;  Surgeon: Jose Ruiz M.D.;  Location: [facility];  Service: Orthopedics   • IRRIGATION & DEBRIDEMENT ORTHO Right 12/18/2018    Procedure: IRRIGATION & DEBRIDEMENT ORTHO-POSTERIOR TIBIALIS DEBRIDEMENT;  Surgeon: Joes Ruiz M.D.;  Location: [facility];  Service: Orthopedics   • ANKLE ORIF Left 9/2/2018    Procedure: ANKLE ORIF;  Surgeon: Dayday Jaramillo M.D.;  Location: [facility];  Service: Orthopedics   • BLADDER SUSPENSION  6/27/2018    Procedure: BLADDER SUSPENSION-   FOR: TRANS OBTURATOR TAPE;  Surgeon: Chapin Banks M.D.;  Location: SURGERY SAME DAY [facility];  Service: Gynecology   • OTHER      neck abscess   • TONSILLECTOMY     • TUBAL LIGATION       SOCHX:  reports that she has been " smoking cigarettes. She has a 9.00 pack-year smoking history. She has never used smokeless tobacco. She reports that she does not drink alcohol or use drugs.  FH: family history includes Allergies in an other family member; Diabetes in an other family member; Other in an other family member.       Assessment/Plan:     1. Acute non-recurrent maxillary sinusitis  - doxycycline (VIBRAMYCIN) 100 MG Tab; Take 1 Tab by mouth 2 times a day for 10 days.  Dispense: 20 Tab; Refill: 0    - Complete full course of antibiotics as prescribed     - predniSONE (DELTASONE) 20 MG Tab; Take 1 Tab by mouth every day for 5 days.  Dispense: 5 Tab; Refill: 0    Discussed use of nedi-pot, humidifier, and Flonase nasal spray for symptomatic relief. Call or return to office if symptoms persist or worsen. The patient demonstrated a good understanding and agreed with the treatment plan.      2. Cough    - benzonatate (TESSALON) 100 MG Cap; Take 1-2 Caps by mouth 3 times a day as needed.  Dispense: 60 Cap; Refill: 0

## 2019-11-04 ENCOUNTER — OFFICE VISIT (OUTPATIENT)
Dept: URGENT CARE | Facility: CLINIC | Age: 39
End: 2019-11-04
Payer: COMMERCIAL

## 2019-11-04 VITALS
OXYGEN SATURATION: 99 % | BODY MASS INDEX: 24.8 KG/M2 | WEIGHT: 158 LBS | SYSTOLIC BLOOD PRESSURE: 120 MMHG | TEMPERATURE: 98.3 F | RESPIRATION RATE: 16 BRPM | DIASTOLIC BLOOD PRESSURE: 74 MMHG | HEIGHT: 67 IN | HEART RATE: 88 BPM

## 2019-11-04 DIAGNOSIS — M54.42 ACUTE LEFT-SIDED LOW BACK PAIN WITH LEFT-SIDED SCIATICA: ICD-10-CM

## 2019-11-04 DIAGNOSIS — M79.7 FIBROMYALGIA: ICD-10-CM

## 2019-11-04 PROCEDURE — 99214 OFFICE O/P EST MOD 30 MIN: CPT | Performed by: PHYSICIAN ASSISTANT

## 2019-11-04 RX ORDER — KETOROLAC TROMETHAMINE 30 MG/ML
30 INJECTION, SOLUTION INTRAMUSCULAR; INTRAVENOUS ONCE
Status: COMPLETED | OUTPATIENT
Start: 2019-11-04 | End: 2019-11-04

## 2019-11-04 RX ADMIN — KETOROLAC TROMETHAMINE 30 MG: 30 INJECTION, SOLUTION INTRAMUSCULAR; INTRAVENOUS at 19:12

## 2019-11-04 ASSESSMENT — ENCOUNTER SYMPTOMS
TINGLING: 1
FEVER: 0
MYALGIAS: 1
BACK PAIN: 1
SENSORY CHANGE: 1
FOCAL WEAKNESS: 0

## 2019-11-05 NOTE — PROGRESS NOTES
"Subjective:   Florencia Lau is a 39 y.o. female who presents for Other (hx of fibromyalgia, flare up in (L) hip. Wants a toradol shot) and Back Pain (xtoday, sciatic pain on (L) side)    This is a new problem.  Patient presents to urgent care with new onset of left low back pain with pain radiating into the left posterior and lateral thigh with some numbness and tingling.  Patient has a history of recurrent issues with sciatica as well as fibromyalgia.  She is here today asking for Toradol shot and Toradol to take home.  Patient denies any bowel or bladder incontinence or unexplained fevers.  Pain is rated at severe.  She has been taking her gabapentin with no real improvement in symptoms.      Other   Associated symptoms include myalgias. Pertinent negatives include no fever.   Back Pain   Associated symptoms include tingling. Pertinent negatives include no fever.     Review of Systems   Constitutional: Negative for fever.   Musculoskeletal: Positive for back pain and myalgias.   Neurological: Positive for tingling and sensory change. Negative for focal weakness.   All other systems reviewed and are negative.    Allergies   Allergen Reactions   • Latex Rash   • Sulfa Drugs Unspecified     \"Muscle spasm\".       • Codeine Unspecified     \"Childhood allergie, not sure what happens\".     • Morphine Itching        Objective:   /74   Pulse 88   Temp 36.8 °C (98.3 °F) (Temporal)   Resp 16   Ht 1.702 m (5' 7\")   Wt 71.7 kg (158 lb)   SpO2 99%   BMI 24.75 kg/m²   Physical Exam  Constitutional:       Appearance: She is well-developed.   HENT:      Head: Normocephalic and atraumatic.      Nose: Nose normal.      Mouth/Throat:      Mouth: Mucous membranes are moist.      Pharynx: Oropharynx is clear.   Eyes:      Extraocular Movements: Extraocular movements intact.      Conjunctiva/sclera: Conjunctivae normal.      Pupils: Pupils are equal, round, and reactive to light.   Neck:      Musculoskeletal: " Normal range of motion and neck supple.   Cardiovascular:      Rate and Rhythm: Normal rate and regular rhythm.      Heart sounds: Normal heart sounds.   Pulmonary:      Effort: Pulmonary effort is normal.      Breath sounds: Normal breath sounds.   Musculoskeletal:      Lumbar back: She exhibits decreased range of motion, tenderness, bony tenderness and pain. She exhibits no swelling, no edema, no deformity and no spasm.        Back:       Comments: Negative straight leg raise bilateral   Lymphadenopathy:      Cervical: No cervical adenopathy.   Skin:     General: Skin is warm and dry.      Findings: No rash.   Neurological:      Mental Status: She is alert and oriented to person, place, and time.      Cranial Nerves: Cranial nerves are intact.      Sensory: Sensation is intact.      Motor: Motor function is intact.      Coordination: Coordination is intact.      Deep Tendon Reflexes:      Reflex Scores:       Patellar reflexes are 2+ on the right side and 2+ on the left side.       Achilles reflexes are 2+ on the left side.  Psychiatric:         Judgment: Judgment normal.             Assessment/Plan:   Assessment    1. Acute left-sided low back pain with left-sided sciatica  - ketorolac (TORADOL) injection 30 mg    2. Fibromyalgia  - ketorolac (TORADOL) injection 30 mg    Patient is given Toradol 30 mg IM here in the clinic.  I reviewed with the patient that I am not comfortable prescribing oral ketorolac for home.  She is encouraged to reach out to her primary car regarding this request.    Differential diagnosis, natural history, supportive care, and indications for immediate follow-up discussed.    Red flag warning symptoms and strict ER/follow-up precautions given.  The patient demonstrated a good understanding and agreed with the treatment plan.  Please note that this note was created using voice recognition speech to text software. Every effort has been made to correct obvious errors.  However, I expect  there are errors of grammar and possibly context that were not discovered prior to finalizing the note  ERIKA Dubose PA-C

## 2020-06-23 ENCOUNTER — HOSPITAL ENCOUNTER (EMERGENCY)
Facility: MEDICAL CENTER | Age: 40
End: 2020-06-24
Attending: EMERGENCY MEDICINE
Payer: COMMERCIAL

## 2020-06-23 ENCOUNTER — APPOINTMENT (OUTPATIENT)
Dept: RADIOLOGY | Facility: MEDICAL CENTER | Age: 40
End: 2020-06-23
Attending: EMERGENCY MEDICINE
Payer: COMMERCIAL

## 2020-06-23 DIAGNOSIS — N94.10 DYSPAREUNIA IN FEMALE: ICD-10-CM

## 2020-06-23 DIAGNOSIS — N93.9 VAGINAL BLEEDING: ICD-10-CM

## 2020-06-23 LAB
ALBUMIN SERPL BCP-MCNC: 4.6 G/DL (ref 3.2–4.9)
ALBUMIN/GLOB SERPL: 1.6 G/DL
ALP SERPL-CCNC: 83 U/L (ref 30–99)
ALT SERPL-CCNC: 12 U/L (ref 2–50)
ANION GAP SERPL CALC-SCNC: 17 MMOL/L (ref 7–16)
AST SERPL-CCNC: 24 U/L (ref 12–45)
BACTERIA GENITAL QL WET PREP: NORMAL
BASOPHILS # BLD AUTO: 0.5 % (ref 0–1.8)
BASOPHILS # BLD: 0.04 K/UL (ref 0–0.12)
BILIRUB SERPL-MCNC: 0.6 MG/DL (ref 0.1–1.5)
BUN SERPL-MCNC: 5 MG/DL (ref 8–22)
CALCIUM SERPL-MCNC: 9.6 MG/DL (ref 8.5–10.5)
CHLORIDE SERPL-SCNC: 100 MMOL/L (ref 96–112)
CO2 SERPL-SCNC: 20 MMOL/L (ref 20–33)
CREAT SERPL-MCNC: 0.6 MG/DL (ref 0.5–1.4)
EOSINOPHIL # BLD AUTO: 0.07 K/UL (ref 0–0.51)
EOSINOPHIL NFR BLD: 0.9 % (ref 0–6.9)
ERYTHROCYTE [DISTWIDTH] IN BLOOD BY AUTOMATED COUNT: 40.7 FL (ref 35.9–50)
GLOBULIN SER CALC-MCNC: 2.8 G/DL (ref 1.9–3.5)
GLUCOSE SERPL-MCNC: 91 MG/DL (ref 65–99)
HCG SERPL QL: NEGATIVE
HCT VFR BLD AUTO: 41.5 % (ref 37–47)
HGB BLD-MCNC: 14.9 G/DL (ref 12–16)
IMM GRANULOCYTES # BLD AUTO: 0.02 K/UL (ref 0–0.11)
IMM GRANULOCYTES NFR BLD AUTO: 0.3 % (ref 0–0.9)
LYMPHOCYTES # BLD AUTO: 2.05 K/UL (ref 1–4.8)
LYMPHOCYTES NFR BLD: 27.2 % (ref 22–41)
MCH RBC QN AUTO: 31.6 PG (ref 27–33)
MCHC RBC AUTO-ENTMCNC: 35.9 G/DL (ref 33.6–35)
MCV RBC AUTO: 88.1 FL (ref 81.4–97.8)
MONOCYTES # BLD AUTO: 0.46 K/UL (ref 0–0.85)
MONOCYTES NFR BLD AUTO: 6.1 % (ref 0–13.4)
NEUTROPHILS # BLD AUTO: 4.9 K/UL (ref 2–7.15)
NEUTROPHILS NFR BLD: 65 % (ref 44–72)
NRBC # BLD AUTO: 0 K/UL
NRBC BLD-RTO: 0 /100 WBC
PLATELET # BLD AUTO: 269 K/UL (ref 164–446)
PMV BLD AUTO: 10.9 FL (ref 9–12.9)
POTASSIUM SERPL-SCNC: 3.6 MMOL/L (ref 3.6–5.5)
PROT SERPL-MCNC: 7.4 G/DL (ref 6–8.2)
RBC # BLD AUTO: 4.71 M/UL (ref 4.2–5.4)
SIGNIFICANT IND 70042: NORMAL
SITE SITE: NORMAL
SODIUM SERPL-SCNC: 137 MMOL/L (ref 135–145)
SOURCE SOURCE: NORMAL
WBC # BLD AUTO: 7.5 K/UL (ref 4.8–10.8)

## 2020-06-23 PROCEDURE — 87491 CHLMYD TRACH DNA AMP PROBE: CPT

## 2020-06-23 PROCEDURE — 99284 EMERGENCY DEPT VISIT MOD MDM: CPT

## 2020-06-23 PROCEDURE — 85025 COMPLETE CBC W/AUTO DIFF WBC: CPT

## 2020-06-23 PROCEDURE — 700111 HCHG RX REV CODE 636 W/ 250 OVERRIDE (IP): Performed by: EMERGENCY MEDICINE

## 2020-06-23 PROCEDURE — 96374 THER/PROPH/DIAG INJ IV PUSH: CPT

## 2020-06-23 PROCEDURE — 87591 N.GONORRHOEAE DNA AMP PROB: CPT

## 2020-06-23 PROCEDURE — 84703 CHORIONIC GONADOTROPIN ASSAY: CPT

## 2020-06-23 PROCEDURE — 76856 US EXAM PELVIC COMPLETE: CPT

## 2020-06-23 PROCEDURE — 80053 COMPREHEN METABOLIC PANEL: CPT

## 2020-06-23 RX ORDER — KETOROLAC TROMETHAMINE 30 MG/ML
15 INJECTION, SOLUTION INTRAMUSCULAR; INTRAVENOUS ONCE
Status: COMPLETED | OUTPATIENT
Start: 2020-06-23 | End: 2020-06-23

## 2020-06-23 RX ADMIN — KETOROLAC TROMETHAMINE 15 MG: 30 INJECTION, SOLUTION INTRAMUSCULAR at 22:56

## 2020-06-23 ASSESSMENT — FIBROSIS 4 INDEX: FIB4 SCORE: 0.68

## 2020-06-24 VITALS
BODY MASS INDEX: 22.49 KG/M2 | DIASTOLIC BLOOD PRESSURE: 72 MMHG | WEIGHT: 143.3 LBS | RESPIRATION RATE: 16 BRPM | TEMPERATURE: 98.1 F | HEIGHT: 67 IN | OXYGEN SATURATION: 98 % | HEART RATE: 81 BPM | SYSTOLIC BLOOD PRESSURE: 121 MMHG

## 2020-06-24 NOTE — ED PROVIDER NOTES
ED Provider Note    CHIEF COMPLAINT  Chief Complaint   Patient presents with   • Vaginal Bleeding     during intercourse   • Dyspareunia       HPI  Patient is a 40-year-old female with remote history of ovarian cyst who presents emergency room for evaluation of vaginal bleeding and pain following vaginal intercourse.  Patient states she had previously had vaginal bleeding and pain following intercourse on an infrequent basis however now she is having profound amounts of bleeding after every sexual encounter.    At baseline, she denies any irregular periods or excessive vaginal bleeding between periods and denies a history of fibroids.  Remote history of STDs for which she received treatment and at baseline denies any dysuria, pneumaturia, painful vaginal mucosa or excessive discharge.  No fevers, chills, belly pain, abdominal distention or painful bowel movements.    PPE Note: I personally donned full PPE for all patient encounters during this visit, including being clean-shaven with an N95 respirator mask, gloves, and goggles.     REVIEW OF SYSTEMS  See HPI for further details. All other systems are negative.     PAST MEDICAL HISTORY   has a past medical history of Anxiety, Anxiety and depression (06/12/2018), Back pain, Bowel habit changes (06/12/2018), Bronchitis, Depression, Fibromyalgia, Hayfever (06/12/2018), Indigestion (06/12/2018), Insomnia, Muscle spasm (06/12/2018), Neck pain, Panic attacks, Shoulder pain, and TMJ syndrome.    SOCIAL HISTORY  Social History     Tobacco Use   • Smoking status: Current Every Day Smoker     Packs/day: 0.50     Years: 18.00     Pack years: 9.00     Types: Cigarettes   • Smokeless tobacco: Never Used   • Tobacco comment: 1 PK per day   Substance and Sexual Activity   • Alcohol use: No   • Drug use: No   • Sexual activity: Not on file       SURGICAL HISTORY   has a past surgical history that includes tonsillectomy; tubal ligation; other; bladder suspension (6/27/2018); ankle  "orif (Left, 9/2/2018); ankle arthroscopy (Right, 12/18/2018); ligament repair (Right, 12/18/2018); irrigation & debridement ortho (Right, 12/18/2018); and hardware removal ortho (Left, 6/18/2019).    CURRENT MEDICATIONS  Home Medications     Reviewed by Patricia Adams R.N. (Registered Nurse) on 06/23/20 at 2043  Med List Status: Complete   Medication Last Dose Status   benzonatate (TESSALON) 100 MG Cap  Active   CVS D3 1000 units Cap  Active   gabapentin (NEURONTIN) 600 MG tablet 6/23/2020 Active   lamoTRIgine (LAMICTAL) 100 MG Tab 6/22/2020 Active   naproxen (NAPROSYN) 500 MG Tab  Active   tizanidine (ZANAFLEX) 4 MG Tab  Active   traZODone (DESYREL) 150 MG Tab  Active   venlafaxine (EFFEXOR XR) 150 MG extended-release capsule 6/22/2020 Active              ALLERGIES  Allergies   Allergen Reactions   • Latex Rash   • Sulfa Drugs Unspecified     \"Muscle spasm\".       • Codeine Unspecified     \"Childhood allergie, not sure what happens\".     • Morphine Itching     PHYSICAL EXAM  VITAL SIGNS: /81   Pulse 100   Temp 36.1 °C (97 °F) (Temporal)   Resp 16   Ht 1.702 m (5' 7\")   Wt 65 kg (143 lb 4.8 oz)   SpO2 96%   BMI 22.44 kg/m²   Pulse ox interpretation: I interpret this pulse ox as normal.  Genl: F sitting in chair comfortably, speaking clearly, appears in no acute distress   Head: NC/AT   ENT: Mucous membranes moist, posterior pharynx clear, uvula midline, nares patent bilaterally   Eyes: Normal sclera, pupils equal round reactive to light  Neck: Supple, FROM, no LAD appreciated   Pulmonary: Lungs are clear to auscultation bilaterally  Chest: No TTP  CV:  RRR, no murmur appreciated, pulses 2+ in both upper and lower extremities,  Abdomen: soft, mild suprapubic tenderness.  ND; no rebound/guarding, no masses palpated, no HSM  : no CVA tenderness.  No vaginal lesions or vesicles. Cervical os is closed. Posterior aspect has irritability with bleeding with no visualized lesions.  Minor blood present in " posterior fornix. No cervical motion tenderness. No adnexal tenderness bilaterally. No pus present.  Musculoskeletal: Pain free ROM of the neck. Moving upper and lower extremities and spontaneous in coordinated fashion  Neuro: A&Ox4 (person, place, time, situation), speech fluent, gait steady, no focal deficits appreciated, No cerebellar signs. Sensation is grossly intact in the distal upper and lower extremities.  5/5 strength in  and dorsiflexion/plantar flexion of the ankles  Psych: Patient has an appropriate affect and behavior  Skin: No rash or lesions.  No pallor or jaundice.  No cyanosis.  Warm and dry.     DIAGNOSTIC STUDIES / PROCEDURES    LABS  Labs Reviewed   CBC WITH DIFFERENTIAL - Abnormal; Notable for the following components:       Result Value    MCHC 35.9 (*)     All other components within normal limits   COMP METABOLIC PANEL - Abnormal; Notable for the following components:    Anion Gap 17.0 (*)     Bun 5 (*)     All other components within normal limits   HCG QUAL SERUM   CHLAMYDIA/GC PCR URINE OR SWAB   ESTIMATED GFR   WET PREP     RADIOLOGY  US-PELVIC COMPLETE (TRANSABDOMINAL/TRANSVAGINAL) (COMBO)   Final Result      1.  Possible small uterine fibroids.   2.  Small left ovarian or paraovarian cyst.        COURSE & MEDICAL DECISION MAKING  Pertinent Labs & Imaging studies reviewed. (See chart for details)    DDX:  Vaginitis, cervical polyp/lesion, Uterine leiyomyoma, AVM, endometrial polyp, adenomyosis, ovulatory dysfunction, endometrial carcinoma, other neoplasia, endometritis, anemia      MDM    Initial evaluation at 2118:  Patient presents emergency room for symptoms as described above.  On initial assessment, patient is nontoxic, has no clinical signs of anemia or active bleeding at this time.  Vaginal exam shows incomplete visualization of the cervix, excluding the posterior third but there is some irritability and minor amounts of blood noted with sweeping with the [place] swab.  No  active bleeding from the cervical loss, no cervical motion tenderness or other signs of cervicitis.  I did not appreciate any friable lesions on my exam and the vaginal walls without any abnormalities.  Wet prep is grossly unremarkable for infectious etiology.  Her H&H is reassuring, pregnancy test is negative and there is no other gross metabolic abnormalities.  Transvaginal ultrasound is obtained and shows multiple small uterine fibroids.  This may be the source of some of her bleeding it is inconsistent at this point because with sexual activity.  I recommend the patient follow-up with her outpatient provider for repeat pelvic exam if she has continued bleeding and trending of her H&H.  She should also follow-up with nonemergent OB/GYN for evaluation of bleeding with ultrasound confirmation of small fibroids.  Discussed my findings with patient, she is amenable to the current plan is discharged home in stable condition.    FINAL IMPRESSION  Visit Diagnoses     ICD-10-CM   1. Vaginal bleeding  N93.9   2. Dyspareunia in female  N94.10     Electronically signed by: Santiago Silva M.D., 6/23/2020 9:18 PM

## 2020-06-24 NOTE — ED TRIAGE NOTES
.  Chief Complaint   Patient presents with   • Vaginal Bleeding     during intercourse   • Dyspareunia      Pt ambulate to triage with above complaints. Pt reports bleeding has been intermittent during intercourse but has recently increased to excessive amount. Notes increase in pain during intercourse as well. Denies bleeding at this time and reports only bleeds during intercourse.   Pt educated on triage process and returned to lobby.

## 2020-06-26 ENCOUNTER — HOSPITAL ENCOUNTER (EMERGENCY)
Facility: MEDICAL CENTER | Age: 40
End: 2020-06-26
Attending: EMERGENCY MEDICINE | Admitting: EMERGENCY MEDICINE
Payer: COMMERCIAL

## 2020-06-26 VITALS
HEIGHT: 67 IN | HEART RATE: 89 BPM | BODY MASS INDEX: 22.87 KG/M2 | DIASTOLIC BLOOD PRESSURE: 67 MMHG | RESPIRATION RATE: 18 BRPM | OXYGEN SATURATION: 97 % | WEIGHT: 145.72 LBS | TEMPERATURE: 98.6 F | SYSTOLIC BLOOD PRESSURE: 111 MMHG

## 2020-06-26 DIAGNOSIS — N93.9 VAGINAL BLEEDING: ICD-10-CM

## 2020-06-26 DIAGNOSIS — R10.2 PELVIC PAIN: ICD-10-CM

## 2020-06-26 LAB
APPEARANCE UR: CLEAR
BACTERIA #/AREA URNS HPF: NEGATIVE /HPF
BILIRUB UR QL STRIP.AUTO: NEGATIVE
COLOR UR: YELLOW
EPI CELLS #/AREA URNS HPF: NORMAL /HPF
ERYTHROCYTE [DISTWIDTH] IN BLOOD BY AUTOMATED COUNT: 41.1 FL (ref 35.9–50)
GLUCOSE UR STRIP.AUTO-MCNC: NEGATIVE MG/DL
HCT VFR BLD AUTO: 40.4 % (ref 37–47)
HGB BLD-MCNC: 13.7 G/DL (ref 12–16)
KETONES UR STRIP.AUTO-MCNC: NEGATIVE MG/DL
LEUKOCYTE ESTERASE UR QL STRIP.AUTO: NEGATIVE
MCH RBC QN AUTO: 30.4 PG (ref 27–33)
MCHC RBC AUTO-ENTMCNC: 33.9 G/DL (ref 33.6–35)
MCV RBC AUTO: 89.8 FL (ref 81.4–97.8)
MICRO URNS: ABNORMAL
NITRITE UR QL STRIP.AUTO: NEGATIVE
PH UR STRIP.AUTO: 6.5 [PH] (ref 5–8)
PLATELET # BLD AUTO: 289 K/UL (ref 164–446)
PMV BLD AUTO: 10.5 FL (ref 9–12.9)
PROT UR QL STRIP: NEGATIVE MG/DL
RBC # BLD AUTO: 4.5 M/UL (ref 4.2–5.4)
RBC # URNS HPF: NORMAL /HPF
RBC UR QL AUTO: ABNORMAL
SP GR UR STRIP.AUTO: <=1.005
UROBILINOGEN UR STRIP.AUTO-MCNC: 0.2 MG/DL
WBC # BLD AUTO: 6.9 K/UL (ref 4.8–10.8)
WBC #/AREA URNS HPF: NORMAL /HPF

## 2020-06-26 PROCEDURE — 96372 THER/PROPH/DIAG INJ SC/IM: CPT

## 2020-06-26 PROCEDURE — 85027 COMPLETE CBC AUTOMATED: CPT

## 2020-06-26 PROCEDURE — 99284 EMERGENCY DEPT VISIT MOD MDM: CPT

## 2020-06-26 PROCEDURE — 700111 HCHG RX REV CODE 636 W/ 250 OVERRIDE (IP): Performed by: EMERGENCY MEDICINE

## 2020-06-26 PROCEDURE — 81001 URINALYSIS AUTO W/SCOPE: CPT

## 2020-06-26 RX ORDER — KETOROLAC TROMETHAMINE 30 MG/ML
30 INJECTION, SOLUTION INTRAMUSCULAR; INTRAVENOUS ONCE
Status: COMPLETED | OUTPATIENT
Start: 2020-06-26 | End: 2020-06-26

## 2020-06-26 RX ADMIN — KETOROLAC TROMETHAMINE 30 MG: 30 INJECTION, SOLUTION INTRAMUSCULAR at 20:26

## 2020-06-26 ASSESSMENT — FIBROSIS 4 INDEX: FIB4 SCORE: 1

## 2020-06-27 NOTE — ED PROVIDER NOTES
ED Provider Note    CHIEF COMPLAINT  Chief Complaint   Patient presents with   • Vaginal Bleeding   • Pelvic Pain   • Vaginal Itching       HPI  Florencia Lau is a 40 y.o. female who presents for evaluation of vaginal bleeding.  This is associated with pelvic pain.  She was evaluated here at the same complaints 3 days ago, she comes in tonight because she was unable to make a timely appointment with a gynecologist and is hoping to be seen by a gynecologist here in the emergency department.  She initially was complaining about pain and heavy bleeding after intercourse, she now states that the bleeding has been constant even in the absence of intercourse.  During her evaluation she had a pelvic examination revealing a small amount of bleeding with an irritated area of the cervix.  Her blood work was unremarkable.   Gonorrhea and Chlamydia were negative.  She has a ultrasound that was performed revealing small uterine fibroids and a small left ovarian cyst.  Her pain seems worse when she urinates.  She states that she has an appointment made with a gynecologist for 1 month from now, she called her regular gynecologist but missed the return phone call and was instructed to call back on Monday.  Past medical history significant for fibromyalgia.    REVIEW OF SYSTEMS  Negative for fever, rash, chest pain, dyspnea. All other systems are negative.     PAST MEDICAL HISTORY   has a past medical history of Anxiety, Anxiety and depression (06/12/2018), Back pain, Bowel habit changes (06/12/2018), Bronchitis, Depression, Fibromyalgia, Hayfever (06/12/2018), Indigestion (06/12/2018), Insomnia, Muscle spasm (06/12/2018), Neck pain, Panic attacks, Shoulder pain, and TMJ syndrome.    SOCIAL HISTORY  Social History     Tobacco Use   • Smoking status: Current Every Day Smoker     Packs/day: 0.50     Years: 18.00     Pack years: 9.00     Types: Cigarettes   • Smokeless tobacco: Never Used   • Tobacco comment: 1 PK per day  "  Substance and Sexual Activity   • Alcohol use: No   • Drug use: No   • Sexual activity: Not on file       SURGICAL HISTORY   has a past surgical history that includes tonsillectomy; tubal ligation; other; bladder suspension (6/27/2018); ankle orif (Left, 9/2/2018); ankle arthroscopy (Right, 12/18/2018); ligament repair (Right, 12/18/2018); irrigation & debridement ortho (Right, 12/18/2018); and hardware removal ortho (Left, 6/18/2019).    CURRENT MEDICATIONS  I personally reviewed the medication list in the charting documentation.     ALLERGIES  Allergies   Allergen Reactions   • Latex Rash   • Sulfa Drugs Unspecified     \"Muscle spasm\".       • Codeine Unspecified     \"Childhood allergie, not sure what happens\".     • Morphine Itching       PHYSICAL EXAM  VITAL SIGNS: /67   Pulse 89   Temp 36.7 °C (98 °F) (Temporal)   Resp 18   Ht 1.702 m (5' 7\")   Wt 66.1 kg (145 lb 11.6 oz)   SpO2 97%   BMI 22.82 kg/m²   Constitutional: Alert in no apparent distress.  HENT: No signs of trauma.   Eyes: Conjunctiva normal, Non-icteric.   Chest: Normal nonlabored respirations  Abdomen: Tenderness across the pelvis, no rebound, no guarding  Skin: No erythema, No rash.   Musculoskeletal: Good range of motion in all major joints.   Neurologic: Alert, No focal deficits noted.   Psychiatric: Affect normal, Judgment normal.    DIAGNOSTIC STUDIES / PROCEDURES    LABS/EKGs  Results for orders placed or performed during the hospital encounter of 06/26/20   CBC WITHOUT DIFFERENTIAL   Result Value Ref Range    WBC 6.9 4.8 - 10.8 K/uL    RBC 4.50 4.20 - 5.40 M/uL    Hemoglobin 13.7 12.0 - 16.0 g/dL    Hematocrit 40.4 37.0 - 47.0 %    MCV 89.8 81.4 - 97.8 fL    MCH 30.4 27.0 - 33.0 pg    MCHC 33.9 33.6 - 35.0 g/dL    RDW 41.1 35.9 - 50.0 fL    Platelet Count 289 164 - 446 K/uL    MPV 10.5 9.0 - 12.9 fL   URINALYSIS,CULTURE IF INDICATED    Specimen: Urine   Result Value Ref Range    Color Yellow     Character Clear     Specific " Gravity <=1.005 <1.035    Ph 6.5 5.0 - 8.0    Glucose Negative Negative mg/dL    Ketones Negative Negative mg/dL    Protein Negative Negative mg/dL    Bilirubin Negative Negative    Urobilinogen, Urine 0.2 Negative    Nitrite Negative Negative    Leukocyte Esterase Negative Negative    Occult Blood Trace (A) Negative    Micro Urine Req Microscopic    URINE MICROSCOPIC (W/UA)   Result Value Ref Range    WBC 0-2 /hpf    RBC 0-2 /hpf    Bacteria Negative None /hpf    Epithelial Cells Rare /hpf        COURSE & MEDICAL DECISION MAKING  Pertinent Labs & Imaging studies reviewed. (See chart for details)    Encounter Summary: This is a 40 y.o. female with ongoing pelvic pain and vaginal bleeding, evaluated here with a complete evaluation few days ago, the only thing that changes in is the bleeding has become more constant.  The patient would like to see a gynecologist here in the emergency department but unfortunately at this point I see no urgent emergent indication for a consultation with a gynecologist here in the emergency department.  Her hemoglobin has not dropped significantly, urinalysis is obtained here in the emergency department and is negative for infection.  She is already had an ultrasound, is already had a pelvic examination, no need to repeat either of these.  I have encouraged her to follow-up with her regular gynecologist who she missed a phone call from today, she will call on Monday.  Gone over strict return instructions and at this time she is stable and appropriate for discharge.      DISPOSITION: Discharge Home      FINAL IMPRESSION  1. Pelvic pain    2. Vaginal bleeding        This dictation was created using voice recognition software. The accuracy of the dictation is limited to the abilities of the software. I expect there may be some errors of grammar and possibly content. The nursing notes were reviewed and certain aspects of this information were incorporated into this note.    Electronically  signed by: Roverto Rodríguez M.D., 6/26/2020 8:02 PM

## 2020-06-27 NOTE — ED TRIAGE NOTES
Pt to triage , c/o vaginal bleeding/ pelvic pain. Pt was seen here on Tuesday for same. Pt states she was to follow up with an OBGYN but can't get in to see one. Was told if not better to come back to ER

## 2020-06-27 NOTE — ED NOTES
Pt roomed in [facility] self ambulated from [facility] with steady gait. Pt states she was here 3 days ago for same complaints and is back d/t not being able to get an OB appointment. Pt resting in bed. Call light in reach and side rails up. No requests at this time.

## 2020-06-27 NOTE — ED NOTES
AVS signed. Discharge instructions given to pt, a verbal understanding of all instructions was stated. Pt self ambulated from ED, all belongings accounted for.

## 2020-08-20 ENCOUNTER — APPOINTMENT (OUTPATIENT)
Dept: RADIOLOGY | Facility: MEDICAL CENTER | Age: 40
End: 2020-08-20
Attending: EMERGENCY MEDICINE
Payer: COMMERCIAL

## 2020-08-20 ENCOUNTER — HOSPITAL ENCOUNTER (EMERGENCY)
Facility: MEDICAL CENTER | Age: 40
End: 2020-08-20
Attending: EMERGENCY MEDICINE
Payer: COMMERCIAL

## 2020-08-20 VITALS
DIASTOLIC BLOOD PRESSURE: 70 MMHG | RESPIRATION RATE: 16 BRPM | OXYGEN SATURATION: 100 % | HEIGHT: 67 IN | WEIGHT: 149.69 LBS | HEART RATE: 72 BPM | BODY MASS INDEX: 23.49 KG/M2 | TEMPERATURE: 97.2 F | SYSTOLIC BLOOD PRESSURE: 110 MMHG

## 2020-08-20 DIAGNOSIS — R14.0 ABDOMINAL DISTENSION: ICD-10-CM

## 2020-08-20 DIAGNOSIS — K59.00 CONSTIPATION, UNSPECIFIED CONSTIPATION TYPE: ICD-10-CM

## 2020-08-20 LAB
ALBUMIN SERPL BCP-MCNC: 4.7 G/DL (ref 3.2–4.9)
ALBUMIN/GLOB SERPL: 1.8 G/DL
ALP SERPL-CCNC: 75 U/L (ref 30–99)
ALT SERPL-CCNC: 14 U/L (ref 2–50)
ANION GAP SERPL CALC-SCNC: 14 MMOL/L (ref 7–16)
AST SERPL-CCNC: 17 U/L (ref 12–45)
BASOPHILS # BLD AUTO: 0.5 % (ref 0–1.8)
BASOPHILS # BLD: 0.03 K/UL (ref 0–0.12)
BILIRUB SERPL-MCNC: 0.3 MG/DL (ref 0.1–1.5)
BUN SERPL-MCNC: 10 MG/DL (ref 8–22)
CALCIUM SERPL-MCNC: 9.5 MG/DL (ref 8.5–10.5)
CHLORIDE SERPL-SCNC: 98 MMOL/L (ref 96–112)
CO2 SERPL-SCNC: 24 MMOL/L (ref 20–33)
CREAT SERPL-MCNC: 0.56 MG/DL (ref 0.5–1.4)
EOSINOPHIL # BLD AUTO: 0.14 K/UL (ref 0–0.51)
EOSINOPHIL NFR BLD: 2.2 % (ref 0–6.9)
ERYTHROCYTE [DISTWIDTH] IN BLOOD BY AUTOMATED COUNT: 43.8 FL (ref 35.9–50)
GLOBULIN SER CALC-MCNC: 2.6 G/DL (ref 1.9–3.5)
GLUCOSE SERPL-MCNC: 86 MG/DL (ref 65–99)
HCG SERPL QL: NEGATIVE
HCT VFR BLD AUTO: 43.5 % (ref 37–47)
HGB BLD-MCNC: 14.7 G/DL (ref 12–16)
IMM GRANULOCYTES # BLD AUTO: 0.02 K/UL (ref 0–0.11)
IMM GRANULOCYTES NFR BLD AUTO: 0.3 % (ref 0–0.9)
LYMPHOCYTES # BLD AUTO: 1.8 K/UL (ref 1–4.8)
LYMPHOCYTES NFR BLD: 27.9 % (ref 22–41)
MCH RBC QN AUTO: 30.5 PG (ref 27–33)
MCHC RBC AUTO-ENTMCNC: 33.8 G/DL (ref 33.6–35)
MCV RBC AUTO: 90.2 FL (ref 81.4–97.8)
MONOCYTES # BLD AUTO: 0.44 K/UL (ref 0–0.85)
MONOCYTES NFR BLD AUTO: 6.8 % (ref 0–13.4)
NEUTROPHILS # BLD AUTO: 4.03 K/UL (ref 2–7.15)
NEUTROPHILS NFR BLD: 62.3 % (ref 44–72)
NRBC # BLD AUTO: 0 K/UL
NRBC BLD-RTO: 0 /100 WBC
PLATELET # BLD AUTO: 286 K/UL (ref 164–446)
PMV BLD AUTO: 10.8 FL (ref 9–12.9)
POTASSIUM SERPL-SCNC: 4 MMOL/L (ref 3.6–5.5)
PROT SERPL-MCNC: 7.3 G/DL (ref 6–8.2)
RBC # BLD AUTO: 4.82 M/UL (ref 4.2–5.4)
SODIUM SERPL-SCNC: 136 MMOL/L (ref 135–145)
T4 FREE SERPL-MCNC: 1.15 NG/DL (ref 0.93–1.7)
TSH SERPL DL<=0.005 MIU/L-ACNC: 1.23 UIU/ML (ref 0.38–5.33)
WBC # BLD AUTO: 6.5 K/UL (ref 4.8–10.8)

## 2020-08-20 PROCEDURE — 36415 COLL VENOUS BLD VENIPUNCTURE: CPT

## 2020-08-20 PROCEDURE — 74177 CT ABD & PELVIS W/CONTRAST: CPT | Mod: ME

## 2020-08-20 PROCEDURE — 700102 HCHG RX REV CODE 250 W/ 637 OVERRIDE(OP): Performed by: EMERGENCY MEDICINE

## 2020-08-20 PROCEDURE — 700101 HCHG RX REV CODE 250: Performed by: EMERGENCY MEDICINE

## 2020-08-20 PROCEDURE — 700117 HCHG RX CONTRAST REV CODE 255: Performed by: EMERGENCY MEDICINE

## 2020-08-20 PROCEDURE — 700105 HCHG RX REV CODE 258: Performed by: EMERGENCY MEDICINE

## 2020-08-20 PROCEDURE — 84439 ASSAY OF FREE THYROXINE: CPT

## 2020-08-20 PROCEDURE — A9270 NON-COVERED ITEM OR SERVICE: HCPCS | Performed by: EMERGENCY MEDICINE

## 2020-08-20 PROCEDURE — 84443 ASSAY THYROID STIM HORMONE: CPT

## 2020-08-20 PROCEDURE — 74022 RADEX COMPL AQT ABD SERIES: CPT

## 2020-08-20 PROCEDURE — 80053 COMPREHEN METABOLIC PANEL: CPT

## 2020-08-20 PROCEDURE — 99284 EMERGENCY DEPT VISIT MOD MDM: CPT

## 2020-08-20 PROCEDURE — 85025 COMPLETE CBC W/AUTO DIFF WBC: CPT

## 2020-08-20 PROCEDURE — 84703 CHORIONIC GONADOTROPIN ASSAY: CPT

## 2020-08-20 RX ORDER — ENEMA 19; 7 G/133ML; G/133ML
1 ENEMA RECTAL ONCE
Status: COMPLETED | OUTPATIENT
Start: 2020-08-20 | End: 2020-08-20

## 2020-08-20 RX ORDER — SODIUM CHLORIDE 9 MG/ML
1000 INJECTION, SOLUTION INTRAVENOUS ONCE
Status: COMPLETED | OUTPATIENT
Start: 2020-08-20 | End: 2020-08-20

## 2020-08-20 RX ORDER — ONDANSETRON 4 MG/1
4 TABLET, ORALLY DISINTEGRATING ORAL EVERY 8 HOURS PRN
Qty: 6 TAB | Refills: 0 | Status: SHIPPED | OUTPATIENT
Start: 2020-08-20 | End: 2020-08-22

## 2020-08-20 RX ADMIN — SODIUM CHLORIDE 1000 ML: 9 INJECTION, SOLUTION INTRAVENOUS at 19:51

## 2020-08-20 RX ADMIN — MAGNESIUM CITRATE 296 ML: 1.75 LIQUID ORAL at 19:51

## 2020-08-20 RX ADMIN — IOHEXOL 100 ML: 350 INJECTION, SOLUTION INTRAVENOUS at 19:05

## 2020-08-20 RX ADMIN — SODIUM PHOSPHATE 133 ML: 7; 19 ENEMA RECTAL at 20:29

## 2020-08-20 ASSESSMENT — LIFESTYLE VARIABLES: DO YOU DRINK ALCOHOL: NO

## 2020-08-20 ASSESSMENT — FIBROSIS 4 INDEX: FIB4 SCORE: 0.93

## 2020-08-20 NOTE — ED PROVIDER NOTES
ED Provider Note    Scribed for Belen Zuniga M.D. by Jamie Spivey. 8/20/2020  4:18 PM    Primary care provider: Elvira Knutson M.D.  Means of arrival: Walk-in  History obtained from: Patient  History limited by: None    CHIEF COMPLAINT  Chief Complaint   Patient presents with   • Constipation     pt has hx of constipation, most recent episode last 2 weeks with regular regmien and unable to move stool.        HPI  Florencia Lau is a 40 y.o. female who presents for evaluation of constipation onset two weeks prior to arrival. She reports that she has had constipation throughout her life and she typically uses Miralax or Milk of Magnesia with complete alleviation, however this time she has not had any movement despite using her normal regimen of laxatives. Her last large bowel movement was two weeks ago but since then all of her bowel movements has been very small and hard. She has since had nausea, foul-smelling gas, bloating, urinary frequency, and decreased appetite.  She denies any abdominal pain.  No vomiting.  She denies any associated fever, chills, or dysuria. Her past surgical history includes tubal ligation and bladder sling placement a few years ago. She reports that her constipation has seemed to have worsened since the bladder sling. She does not have thyroid issues but does have fibromyalgia. She is not on any narcotics.  She is to follow up with GI regarding her constipation, but 2 years ago her daughter started having medical problems and she let her own health go to the [place] a bit.    REVIEW OF SYSTEMS  Pertinent positives include: constipation, nausea, vomiting, gas, bloating, urinary frequency, mild abdominal pain, and decreased appetite.  Pertinent negatives include: no fever, chills, or dysuria.  See HPI for further details. All other systems are negative.    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\"   • Neck pain    • Panic attacks    • Shoulder pain    • TMJ syndrome        FAMILY HISTORY  Family History   Problem Relation Age of Onset   • Diabetes Other    • Allergies Other         RA   • Other Other         DIVERTICULITIS, LUPUS, DDD, FM       SOCIAL HISTORY  Social History     Tobacco Use   • Smoking status: Current Every Day Smoker     Packs/day: 0.50     Years: 18.00     Pack years: 9.00     Types: Cigarettes   • Smokeless tobacco: Never Used   • Tobacco comment: 1 PK per day   Substance and Sexual Activity   • Alcohol use: No   • Drug use: No     SURGICAL HISTORY  Past Surgical History:   Procedure Laterality Date   • HARDWARE REMOVAL ORTHO Left 6/18/2019    Procedure: HARDWARE REMOVAL ORTHO - FIBULA;  Surgeon: Jose Ruiz M.D.;  Location: [facility];  Service: Orthopedics   • ANKLE ARTHROSCOPY Right 12/18/2018    Procedure: ANKLE ARTHROSCOPY;  Surgeon: Jose Ruiz M.D.;  Location: [facility];  Service: Orthopedics   • LIGAMENT REPAIR Right 12/18/2018    Procedure: LIGAMENT REPAIR- ANTERIOR TALOFIBULAR/ CALCANEOFIBULAR, POSTERIOR TIBIALIS REPAIR;  Surgeon: Jose Ruiz M.D.;  Location: [facility];  Service: Orthopedics   • IRRIGATION & DEBRIDEMENT ORTHO Right 12/18/2018    Procedure: IRRIGATION & DEBRIDEMENT ORTHO-POSTERIOR TIBIALIS DEBRIDEMENT;  Surgeon: Jose Ruiz M.D.;  [facility];  Service: Orthopedics   • ANKLE ORIF Left 9/2/2018    Procedure: ANKLE ORIF;  Surgeon: Dayday Jaramillo M.D.;  Location: [facility];  Service: Orthopedics   • BLADDER SUSPENSION  6/27/2018    Procedure: BLADDER SUSPENSION-   FOR: TRANS OBTURATOR TAPE; " " Surgeon: Chapin Banks M.D.;  Location: SURGERY SAME DAY [facility];  Service: Gynecology   • OTHER      neck abscess   • TONSILLECTOMY     • TUBAL LIGATION         CURRENT MEDICATIONS  No current facility-administered medications for this encounter.     Current Outpatient Medications:   •  ondansetron (ZOFRAN ODT) 4 MG TABLET DISPERSIBLE, Take 1 Tab by mouth every 8 hours as needed for up to 2 days., Disp: 6 Tab, Rfl: 0  •  benzonatate (TESSALON) 100 MG Cap, Take 1-2 Caps by mouth 3 times a day as needed., Disp: 60 Cap, Rfl: 0  •  tizanidine (ZANAFLEX) 4 MG Tab, Take 4-8 mg by mouth at bedtime as needed., Disp: , Rfl:   •  naproxen (NAPROSYN) 500 MG Tab, Take 500 mg by mouth 2 times a day, with meals., Disp: , Rfl:   •  [employer] D3 1000 units Cap, Take 1 Cap by mouth every day., Disp: , Rfl: 5  •  gabapentin (NEURONTIN) 600 MG tablet, Take 600 mg by mouth 4 times a day., Disp: , Rfl:   •  traZODone (DESYREL) 150 MG Tab, Take  mg by mouth every evening., Disp: , Rfl:   •  venlafaxine (EFFEXOR XR) 150 MG extended-release capsule, Take 150 mg by mouth every day., Disp: , Rfl:   •  lamoTRIgine (LAMICTAL) 100 MG Tab, Take 100 mg by mouth every day., Disp: , Rfl:     ALLERGIES  Allergies   Allergen Reactions   • Latex Rash   • Sulfa Drugs Unspecified     \"Muscle spasm\".       • Codeine Unspecified     \"Childhood allergie, not sure what happens\".     • Morphine Itching       PHYSICAL EXAM  VITAL SIGNS: /85   Pulse 90   Temp 36.3 °C (97.3 °F) (Temporal)   Resp 16   Ht 1.702 m (5' 7\")   Wt 67.9 kg (149 lb 11.1 oz)   SpO2 96%   BMI 23.45 kg/m²      Constitutional: Well developed, well nourished; No acute distress; Non-toxic appearance.   HENT: Normocephalic, atraumatic; Bilateral external ears normal; Oropharyngeal exam deferred to COVID-19 outbreak and lack of oropharyngeal complaints.  Eyes: PERRL, EOMI, Conjunctiva normal. No discharge.   Neck:  Supple, nontender midline; No stridor; No nuchal " rigidity.   Lymphatic: No cervical lymphadenopathy noted.   Cardiovascular: Regular rate and rhythm without murmurs, rubs, or gallop.   Thorax & Lungs: No respiratory distress, breath sounds clear to auscultation bilaterally without wheezing, rales or rhonchi. Nontender chest wall. No crepitus or subcutaneous air  Abdomen: Mild inconsistent tenderness in midline suprapubic area. Soft, bowel sounds normal. No obvious masses; No pulsatile masses; no rebound, guarding, or peritoneal signs.   Skin: Good color; warm and dry without rash or petechia.  Back: Nontender, No CVA tenderness.   Extremities: Distal radial, dorsalis pedis, posterior tibial pulses are equal bilaterally; No edema; Nontender calves or saphenous, No cyanosis, No clubbing.   Musculoskeletal: Good range of motion in all major joints. No tenderness to palpation or major deformities noted.   Neurologic: Alert & oriented x 4, clear speech        LABS/RADIOLOGY/PROCEDURES  Results for orders placed or performed during the hospital encounter of 08/20/20   CBC WITH DIFFERENTIAL   Result Value Ref Range    WBC 6.5 4.8 - 10.8 K/uL    RBC 4.82 4.20 - 5.40 M/uL    Hemoglobin 14.7 12.0 - 16.0 g/dL    Hematocrit 43.5 37.0 - 47.0 %    MCV 90.2 81.4 - 97.8 fL    MCH 30.5 27.0 - 33.0 pg    MCHC 33.8 33.6 - 35.0 g/dL    RDW 43.8 35.9 - 50.0 fL    Platelet Count 286 164 - 446 K/uL    MPV 10.8 9.0 - 12.9 fL    Neutrophils-Polys 62.30 44.00 - 72.00 %    Lymphocytes 27.90 22.00 - 41.00 %    Monocytes 6.80 0.00 - 13.40 %    Eosinophils 2.20 0.00 - 6.90 %    Basophils 0.50 0.00 - 1.80 %    Immature Granulocytes 0.30 0.00 - 0.90 %    Nucleated RBC 0.00 /100 WBC    Neutrophils (Absolute) 4.03 2.00 - 7.15 K/uL    Lymphs (Absolute) 1.80 1.00 - 4.80 K/uL    Monos (Absolute) 0.44 0.00 - 0.85 K/uL    Eos (Absolute) 0.14 0.00 - 0.51 K/uL    Baso (Absolute) 0.03 0.00 - 0.12 K/uL    Immature Granulocytes (abs) 0.02 0.00 - 0.11 K/uL    NRBC (Absolute) 0.00 K/uL   COMP METABOLIC PANEL    Result Value Ref Range    Sodium 136 135 - 145 mmol/L    Potassium 4.0 3.6 - 5.5 mmol/L    Chloride 98 96 - 112 mmol/L    Co2 24 20 - 33 mmol/L    Anion Gap 14.0 7.0 - 16.0    Glucose 86 65 - 99 mg/dL    Bun 10 8 - 22 mg/dL    Creatinine 0.56 0.50 - 1.40 mg/dL    Calcium 9.5 8.5 - 10.5 mg/dL    AST(SGOT) 17 12 - 45 U/L    ALT(SGPT) 14 2 - 50 U/L    Alkaline Phosphatase 75 30 - 99 U/L    Total Bilirubin 0.3 0.1 - 1.5 mg/dL    Albumin 4.7 3.2 - 4.9 g/dL    Total Protein 7.3 6.0 - 8.2 g/dL    Globulin 2.6 1.9 - 3.5 g/dL    A-G Ratio 1.8 g/dL   TSH   Result Value Ref Range    TSH 1.230 0.380 - 5.330 uIU/mL   HCG QUAL SERUM   Result Value Ref Range    Beta-Hcg Qualitative Serum Negative Negative   FREE THYROXINE   Result Value Ref Range    Free T-4 1.15 0.93 - 1.70 ng/dL   ESTIMATED GFR   Result Value Ref Range    GFR If African American >60 >60 mL/min/1.73 m 2    GFR If Non African American >60 >60 mL/min/1.73 m 2         CT-ABDOMEN-PELVIS WITH   Final Result      No evidence of abdominal or pelvic mass, adenopathy, or inflammatory change.      DX-ABDOMEN COMPLETE WITH AP OR PA CXR   Final Result      1.  No evidence of bowel obstruction.      2.  Mild constipation.          COURSE & MEDICAL DECISION MAKING  Pertinent Labs & Imaging studies reviewed. (See chart for details)        4:18 PM - Patient seen and examined at bedside. Discussed plan of care, including labs and imaging. Ordered for labs and imaging to evaluate her symptoms.     7:18 PM - Patient treated with fleet enema and magnesium citrate.    7:48 PM - Patient treated with IV fluids.    8:00 PM - Patient was reevaluated at bedside. Discussed lab and radiology results with the patient and informed them that there is no evidence of an obstruction. Explained importance of GI follow up.       Patient presents to the ER complaining of constipation for the last 2 weeks.  She states she has a long history of constipation ever since she was a child.  Normally she  uses a bowel cleansing and laxative regimen which takes care of her problem.  She has been using her same regimen over the last 2 weeks and is only had infrequent hard and small bowel movements.  No abdominal pain.  She is had some nausea but no vomiting.  No fevers or chills.  No blood in stool.  No urinary symptoms.  Patient complains of being very gassy.  She states her gas is very foul-smelling.  She says typically she does not have such foul-smelling gas.  The patient has a soft and nontender abdomen.  Her bowel sounds are present.  Plain x-ray revealed only mild constipation.  Since she was describing quite a lot of abdominal distention with abnormal gas production, I went ahead and did a CT scan of the abdomen pelvis just to be sure we were not dealing with something else.  CT scan is negative.  Lab work is unremarkable.  Electrolytes are normal.  Thyroid functions normal.  Vitals are normal and stable.  She was given an enema and she had a large bowel movement.  She is quite happy with the results.  She says she feels less gassy and has less abdominal distention.  I strongly encouraged her to follow back up with GI.  In the meantime I encouraged her to increase the fiber in her diet.  She is to drink plenty of water.  At this time I think the patient is safe and stable for outpatient management discharge home.  No evidence of ectopic pregnancy, urinary tract infection, ureteral stone, appendicitis, diverticulitis, colitis, obstruction, perforation, or any other dangerous intra-abdominal pathology.  Patient has been given strict return precautions and discharge instructions    HYDRATION: Based on the patient's presentation of Dehydration the patient was given IV fluids. IV Hydration was used because oral hydration was not adequate alone. Upon recheck following hydration, the patient was improved.    PPE Note: I verified that the patient was wearing a mask and I was wearing appropriate PPE every time I entered  the room. The patient's mask was on the patient at all times during my encounter.     Scribe remained outside the patient's room and did not have any contact with the patient for the duration of patient encounter.         FINAL IMPRESSION  1. Constipation, unspecified constipation type Acute   2. Abdominal distension Acute        This dictation has been created using voice recognition software. The accuracy of the dictation is limited by the abilities of the software. I expect there may be some errors of grammar and possibly content. I made every attempt to manually correct the errors within my dictation. However, errors related to voice recognition software may still exist and should be interpreted within the appropriate context.     Jamie AHUMADA (Scribe), am scribing for, and in the presence of, Belen Zuniga M.D..    Electronically signed by: Jamei Spivey ([place]), 8/20/2020    Belen AHUMADA M.D. personally performed the services described in this documentation, as scribed by Jamie Spivey in my presence, and it is both accurate and complete. C    The note accurately reflects work and decisions made by me.  Belen Zuniga M.D.  8/21/2020  2:09 AM

## 2020-08-20 NOTE — ED NOTES
Patient ambulatory to BR with steady gait with SBA by this RN. Specimen container provided and instructed on clean catch urine sample - verbalized understanding.

## 2020-08-20 NOTE — ED TRIAGE NOTES
Florencia Lau  40 y.o.  Chief Complaint   Patient presents with   • Constipation     pt has hx of constipation, most recent episode last 2 weeks with regular regmien and unable to move stool.        Patient to triage with above complaint.  Pt reports MOM and mag Citrate usually work, but ware not working this time.  Pt reports RLQ abdominal pain as well.      Vitals:    08/20/20 1451   BP: 123/85   Pulse: 90   Resp: 16   Temp: 36.3 °C (97.3 °F)   SpO2: 96%       Triage process explained to patient, apologized for wait time, and returned to lobby.  Pt informed to notify staff of any change in condition. NAD at this time.

## 2020-08-20 NOTE — ED NOTES
Patient ambulatory from [facility] to [facility] with steady gait accompanied by ED Tech. Chart up for ERP.

## 2020-08-21 NOTE — DISCHARGE INSTRUCTIONS
Follow-up with Dr. Knutson within the next 1 to 2 days.  Also follow-up with [facility] this week.  Please call for appointments.    Return to the ER for any worsening abdominal distention, worsening constipation, worsening nausea, vomiting, fevers over 100.4, shaking chills, blood in stool, or for any concerns.

## 2020-08-21 NOTE — ED NOTES
Patient given discharge instruction and Rx. Patient feeling much better, was able to have another BM. Pt to follow up with GI.

## 2020-08-21 NOTE — ED NOTES
IV access placed and blood rainbow drawn per orders and sent to lab. Patient tolerated well with minimal complaints of discomfort. Clean catch urine specimen sent to lab. Updated regarding plan of care - awaiting x-ray.

## 2020-08-25 ENCOUNTER — NURSE TRIAGE (OUTPATIENT)
Dept: HEALTH INFORMATION MANAGEMENT | Facility: OTHER | Age: 40
End: 2020-08-25

## 2020-08-25 NOTE — TELEPHONE ENCOUNTER
1. Caller Name: Florencia Lau                 Call Back Number: [phone] (home)     [facility] PCP or Specialty Provider: No  outside [facility]        2.  In the last two weeks, has the patient had any new or worsening symptoms (not explained by alternative diagnosis)? No.    3.  Does patient have any comoribidities? None     4.  Has the patient traveled in the last 14 days OR had any known contact with someone who is suspected or confirmed to have COVID-19?  Yes, Daughter is having symptoms, no exposure or travel that they know of    5. POTENTIAL PUI (LOW): Advised to perform self care, monitor for worsening symptoms and to call back in 3 days if no improvement. Instructed to self isolate and contact [facility] at [phone]     Patient will contact her PCP for an order if possible. Advised her of our testing location.     Note routed to [facility] Provider: JODI only.

## 2020-08-26 ENCOUNTER — HOSPITAL ENCOUNTER (OUTPATIENT)
Dept: LAB | Facility: MEDICAL CENTER | Age: 40
End: 2020-08-26
Attending: FAMILY MEDICINE
Payer: COMMERCIAL

## 2020-08-26 LAB
COVID ORDER STATUS COVID19: NORMAL
SARS-COV-2 RNA RESP QL NAA+PROBE: NOTDETECTED
SPECIMEN SOURCE: NORMAL

## 2020-08-26 PROCEDURE — C9803 HOPD COVID-19 SPEC COLLECT: HCPCS

## 2020-08-26 PROCEDURE — U0003 INFECTIOUS AGENT DETECTION BY NUCLEIC ACID (DNA OR RNA); SEVERE ACUTE RESPIRATORY SYNDROME CORONAVIRUS 2 (SARS-COV-2) (CORONAVIRUS DISEASE [COVID-19]), AMPLIFIED PROBE TECHNIQUE, MAKING USE OF HIGH THROUGHPUT TECHNOLOGIES AS DESCRIBED BY CMS-2020-01-R: HCPCS

## 2020-09-08 NOTE — TELEPHONE ENCOUNTER
Any,    Please call the patient and ask her to get x-rays of sacroiliac joint which is recommended by rheumatologist before her appointment with Dr. Adamson on 01/23.  I ordered x-ray.     Last Visit- 08/03/2020  Next Visit- 02/03/2021  Last prescription- 06/09/2020

## 2020-11-26 ENCOUNTER — HOSPITAL ENCOUNTER (EMERGENCY)
Facility: MEDICAL CENTER | Age: 40
End: 2020-11-26
Attending: EMERGENCY MEDICINE
Payer: COMMERCIAL

## 2020-11-26 ENCOUNTER — APPOINTMENT (OUTPATIENT)
Dept: RADIOLOGY | Facility: MEDICAL CENTER | Age: 40
End: 2020-11-26
Attending: EMERGENCY MEDICINE
Payer: COMMERCIAL

## 2020-11-26 VITALS
WEIGHT: 152.78 LBS | TEMPERATURE: 98.2 F | HEART RATE: 85 BPM | RESPIRATION RATE: 16 BRPM | HEIGHT: 66 IN | SYSTOLIC BLOOD PRESSURE: 127 MMHG | DIASTOLIC BLOOD PRESSURE: 81 MMHG | OXYGEN SATURATION: 96 % | BODY MASS INDEX: 24.55 KG/M2

## 2020-11-26 DIAGNOSIS — S09.90XA CLOSED HEAD INJURY, INITIAL ENCOUNTER: ICD-10-CM

## 2020-11-26 DIAGNOSIS — R00.2 PALPITATIONS: ICD-10-CM

## 2020-11-26 LAB — EKG IMPRESSION: NORMAL

## 2020-11-26 PROCEDURE — 93005 ELECTROCARDIOGRAM TRACING: CPT | Performed by: EMERGENCY MEDICINE

## 2020-11-26 PROCEDURE — 96372 THER/PROPH/DIAG INJ SC/IM: CPT

## 2020-11-26 PROCEDURE — 70450 CT HEAD/BRAIN W/O DYE: CPT

## 2020-11-26 PROCEDURE — 99284 EMERGENCY DEPT VISIT MOD MDM: CPT

## 2020-11-26 PROCEDURE — 700111 HCHG RX REV CODE 636 W/ 250 OVERRIDE (IP): Performed by: EMERGENCY MEDICINE

## 2020-11-26 RX ORDER — METHOCARBAMOL 500 MG/1
500 TABLET, FILM COATED ORAL 3 TIMES DAILY
COMMUNITY
End: 2021-06-08

## 2020-11-26 RX ORDER — KETOROLAC TROMETHAMINE 10 MG/1
10 TABLET, FILM COATED ORAL EVERY 4 HOURS PRN
Qty: 12 TAB | Refills: 0 | Status: SHIPPED | OUTPATIENT
Start: 2020-11-26 | End: 2020-11-29

## 2020-11-26 RX ORDER — ONDANSETRON 4 MG/1
4 TABLET, ORALLY DISINTEGRATING ORAL EVERY 8 HOURS PRN
Qty: 10 TAB | Refills: 0 | Status: SHIPPED | OUTPATIENT
Start: 2020-11-26 | End: 2021-06-08

## 2020-11-26 RX ORDER — ONDANSETRON 4 MG/1
4 TABLET, ORALLY DISINTEGRATING ORAL ONCE
Status: COMPLETED | OUTPATIENT
Start: 2020-11-26 | End: 2020-11-26

## 2020-11-26 RX ORDER — KETOROLAC TROMETHAMINE 30 MG/ML
60 INJECTION, SOLUTION INTRAMUSCULAR; INTRAVENOUS ONCE
Status: COMPLETED | OUTPATIENT
Start: 2020-11-26 | End: 2020-11-26

## 2020-11-26 RX ORDER — TRAMADOL HYDROCHLORIDE 50 MG/1
50 TABLET ORAL EVERY 6 HOURS PRN
COMMUNITY
End: 2021-06-08

## 2020-11-26 RX ADMIN — KETOROLAC TROMETHAMINE 60 MG: 30 INJECTION, SOLUTION INTRAMUSCULAR at 18:11

## 2020-11-26 RX ADMIN — ONDANSETRON 4 MG: 4 TABLET, ORALLY DISINTEGRATING ORAL at 18:17

## 2020-11-26 ASSESSMENT — FIBROSIS 4 INDEX: FIB4 SCORE: 0.62

## 2020-11-26 NOTE — ED TRIAGE NOTES
Pt to ED with complaints of headache, difficulty focusing, nausea, fatigue, and neck pain following a GLF Monday. She reports she tripped and hit head on dresser. She denies LOC. Denies use of blood thinners.  She does report some blurred vision. SAMINA MCRAE. Alert and oriented x4.    Mask in place. No respiratory complaints.     Pt educated on ED process and asked to wait in lobby. Patient educated on importance of alerting staff to new or worsening symptoms or concerns.

## 2020-11-26 NOTE — ED PROVIDER NOTES
"CHIEF COMPLAINT  Chief Complaint   Patient presents with   • T-5000 FALL   • Head Injury   • Nausea   • Headache   • Neck Pain       HPI  Florencia Lau is a 40 y.o. female who presents after she essentially had a mechanical fall and hit her head on a dresser, 4 days ago.  She notes since that time she had an episode of vomiting and is had some blurred vision bilaterally.  She notes pain from her head radiates down both sides of her neck.  She notes no numbness or weakness in her extremities.  No trouble with speech.  No trouble with balance.  She states she has taken Motrin and Tylenol nothing seems to make her headache better-it seems to be worsening.  She denies any blood thinner use.    REVIEW OF SYSTEMS  All other systems are negative.     PAST MEDICAL HISTORY  Past Medical History:   Diagnosis Date   • Anxiety    • Anxiety and depression 06/12/2018   • Back pain    • Bowel habit changes 06/12/2018    \"Constipation/Diarrhea\"   • Bronchitis     HX OF   • Depression    • Dysfunctional uterine bleeding    • Fibromyalgia    • Hayfever 06/12/2018   • Indigestion 06/12/2018   • Insomnia    • Muscle spasm 06/12/2018    \"My neurologist told me I have a muscle spasm disorder\"   • Neck pain    • Panic attacks    • Shoulder pain    • TMJ syndrome        FAMILY HISTORY  Family History   Problem Relation Age of Onset   • Diabetes Other    • Allergies Other         RA   • Other Other         DIVERTICULITIS, LUPUS, DDD, FM       SOCIAL HISTORY  Social History     Socioeconomic History   • Marital status: Single     Spouse name: Not on file   • Number of children: Not on file   • Years of education: Not on file   • Highest education level: Not on file   Occupational History   • Not on file   Social Needs   • Financial resource strain: Not on file   • Food insecurity     Worry: Not on file     Inability: Not on file   • Transportation needs     Medical: Not on file     Non-medical: Not on file   Tobacco Use   • Smoking " status: Current Every Day Smoker     Packs/day: 0.50     Years: 18.00     Pack years: 9.00     Types: Cigarettes   • Smokeless tobacco: Never Used   • Tobacco comment: 1 PK per day   Substance and Sexual Activity   • Alcohol use: No   • Drug use: No   • Sexual activity: Not on file   Lifestyle   • Physical activity     Days per week: Not on file     Minutes per session: Not on file   • Stress: Not on file   Relationships   • Social connections     Talks on phone: Not on file     Gets together: Not on file     Attends [employer] service: Not on file     Active member of club or organization: Not on file     Attends meetings of clubs or organizations: Not on file     Relationship status: Not on file   • Intimate partner violence     Fear of current or ex partner: Not on file     Emotionally abused: Not on file     Physically abused: Not on file     Forced sexual activity: Not on file   Other Topics Concern   • Not on file   Social History Narrative   • Not on file       SURGICAL HISTORY  Past Surgical History:   Procedure Laterality Date   • HARDWARE REMOVAL ORTHO Left 6/18/2019    Procedure: HARDWARE REMOVAL ORTHO - FIBULA;  Surgeon: Jose Ruiz M.D.;  Location: [facility];  Service: Orthopedics   • ANKLE ARTHROSCOPY Right 12/18/2018    Procedure: ANKLE ARTHROSCOPY;  Surgeon: Jose Ruiz M.D.;  Location: [facility];  Service: Orthopedics   • LIGAMENT REPAIR Right 12/18/2018    Procedure: LIGAMENT REPAIR- ANTERIOR TALOFIBULAR/ CALCANEOFIBULAR, POSTERIOR TIBIALIS REPAIR;  Surgeon: Jose Ruiz M.D.;  Location: [facility];  Service: Orthopedics   • IRRIGATION & DEBRIDEMENT ORTHO Right 12/18/2018    Procedure: IRRIGATION & DEBRIDEMENT ORTHO-POSTERIOR TIBIALIS DEBRIDEMENT;  Surgeon: Jose Ruiz M.D.;  Location: [facility];  Service: Orthopedics   • ANKLE ORIF Left 9/2/2018    Procedure: ANKLE ORIF;  Surgeon: Dayday Jaramillo M.D.;  Location:  "[facility]GERY [facility];  Service: Orthopedics   • BLADDER SUSPENSION  6/27/2018    Procedure: BLADDER SUSPENSION-   FOR: TRANS OBTURATOR TAPE;  Surgeon: Chapin Banks M.D.;  Location: SURGERY SAME DAY [facility];  Service: Gynecology   • OTHER      neck abscess   • TONSILLECTOMY     • TUBAL LIGATION         CURRENT MEDICATIONS  Home Medications     Reviewed by Margo Patel R.N. (Registered Nurse) on 11/26/20 at 1510  Med List Status: Partial   Medication Last Dose Status   benzonatate (TESSALON) 100 MG Cap  Active   [employer] D3 1000 units Cap  Active   gabapentin (NEURONTIN) 600 MG tablet  Active   lamoTRIgine (LAMICTAL) 100 MG Tab  Active   methocarbamol (ROBAXIN) 500 MG Tab  Active   naproxen (NAPROSYN) 500 MG Tab not taking Active   tizanidine (ZANAFLEX) 4 MG Tab not taking Active   traMADol (ULTRAM) 50 MG Tab  Active   traZODone (DESYREL) 150 MG Tab  Active   venlafaxine (EFFEXOR XR) 150 MG extended-release capsule  Active                ALLERGIES  Allergies   Allergen Reactions   • Latex Rash   • Sulfa Drugs Unspecified     \"Muscle spasm\".       • Codeine Unspecified     \"Childhood allergie, not sure what happens\".     • Morphine Itching       PHYSICAL EXAM  VITAL SIGNS: /97   Pulse 98   Temp 36.2 °C (97.2 °F) (Temporal)   Resp 18   Ht 1.676 m (5' 6\")   Wt 69.3 kg (152 lb 12.5 oz)   SpO2 98%   BMI 24.66 kg/m²      Constitutional: Well developed, Well nourished, No acute distress, Non-toxic appearance.   HENT: Normocephalic, Atraumatic, TMs normal, mucous membranes moist, no erythema, exudates, swelling, or masses, nares patent  Eyes: nonicteric  Neck: Supple, no C-spine tenderness to palpation  Lymphatic: No lymphadenopathy noted.   Cardiovascular: Regular rate and rhythm, no gallops rubs or murmurs  Lungs: Clear bilaterally   Skin: Warm, Dry, no rash  Back: No TLS spine tenderness  Genitalia: Deferred  Rectal: Deferred  Extremities: No edema  Neurologic: Alert, appropriate, follows " "commands, moving all extremities, normal speech, cranial nerves intact, finger-nose intact, no drift, no hemineglect, visual fields intact  Psychiatric: Affect normal    EKG-time 437, rate 89, sinus rhythm, intervals remarkable for borderline short OR, no ST elevation or depression or T wave changes, no ectopy    RADIOLOGY/PROCEDURES  CT-HEAD W/O   Final Result      1.  Head CT without contrast within normal limits. No evidence of acute cerebral infarction, hemorrhage or mass lesion.            COURSE & MEDICAL DECISION MAKING  Pertinent Labs & Imaging studies reviewed. (See chart for details)  This is a 40-year-old female who presents after an apparent mechanical fall where she hit her head on the dresser.  She has had residual symptoms from that-head CT here is unremarkable.  She has a nonfocal neurologic exam.  She also reported that since that time she has felt somewhat anxious and her heart has been \"pounding hard\".  Her EKG here demonstrates a possible short OR which could indicate a predisposition to SVT.  I am going to refer the patient to cardiology for outpatient Holter and/or event monitoring.  She otherwise complains of no chest pain or shortness of breath.  Vitals otherwise reassuring.    FINAL IMPRESSION  1.  Minor head injury  2.  Palpitations  3.         Electronically signed by: Roland Goins M.D., 11/26/2020 3:36 PM      "

## 2020-11-27 NOTE — DISCHARGE INSTRUCTIONS
Return for vomiting, any neurologic symptoms such as trouble with speech vision numbness weakness etc.  You have some abnormalities on your EKG that could indicate a predisposition to supraventricular tachycardia.  Please follow-up with cardiology.

## 2020-11-27 NOTE — ED NOTES
D/C home with written and verbal instructions re: Rx, activity, f/u.  Pt requesting Rx for Toradol, report of same to ERP via Voalte message.  Verbalizes understanding.  Ambulated out

## 2020-12-01 ENCOUNTER — OFFICE VISIT (OUTPATIENT)
Dept: URGENT CARE | Facility: CLINIC | Age: 40
End: 2020-12-01

## 2020-12-01 ENCOUNTER — APPOINTMENT (OUTPATIENT)
Dept: RADIOLOGY | Facility: IMAGING CENTER | Age: 40
End: 2020-12-01
Attending: NURSE PRACTITIONER

## 2020-12-01 VITALS
SYSTOLIC BLOOD PRESSURE: 96 MMHG | HEIGHT: 66 IN | OXYGEN SATURATION: 97 % | TEMPERATURE: 97.8 F | BODY MASS INDEX: 24.43 KG/M2 | WEIGHT: 152 LBS | DIASTOLIC BLOOD PRESSURE: 58 MMHG | HEART RATE: 78 BPM | RESPIRATION RATE: 12 BRPM

## 2020-12-01 DIAGNOSIS — M79.672 LEFT FOOT PAIN: ICD-10-CM

## 2020-12-01 DIAGNOSIS — S90.32XA CONTUSION OF LEFT FOOT, INITIAL ENCOUNTER: ICD-10-CM

## 2020-12-01 PROCEDURE — 73630 X-RAY EXAM OF FOOT: CPT | Mod: TC,LT | Performed by: NURSE PRACTITIONER

## 2020-12-01 PROCEDURE — 99214 OFFICE O/P EST MOD 30 MIN: CPT | Performed by: NURSE PRACTITIONER

## 2020-12-01 ASSESSMENT — ENCOUNTER SYMPTOMS
CHILLS: 0
SORE THROAT: 0
SHORTNESS OF BREATH: 0
NUMBNESS: 0
MYALGIAS: 0
DIZZINESS: 0
EYE REDNESS: 0
CHANGE IN BOWEL HABIT: 0
ABDOMINAL PAIN: 0
NAUSEA: 0
VOMITING: 0
FEVER: 0
JOINT SWELLING: 1
WEAKNESS: 0

## 2020-12-01 ASSESSMENT — FIBROSIS 4 INDEX: FIB4 SCORE: 0.62

## 2020-12-02 NOTE — PROGRESS NOTES
Subjective:   Florencia Lau is a 40 y.o. female who presents for Foot Injury (x 6 days on L inner foot.  She said she kicked the side of a table.  Pt. is having swelling, pain and bruising.  )      Foot Problem  This is a new problem. The current episode started in the past 7 days (Kicked a side table has had pain since). The problem occurs constantly. The problem has been gradually worsening. Associated symptoms include joint swelling. Pertinent negatives include no abdominal pain, change in bowel habit, chest pain, chills, fever, myalgias, nausea, numbness, rash, sore throat, vomiting or weakness. The symptoms are aggravated by walking. She has tried acetaminophen and rest for the symptoms. The treatment provided no relief.       Review of Systems   Constitutional: Negative for chills and fever.   HENT: Negative for sore throat.    Eyes: Negative for redness.   Respiratory: Negative for shortness of breath.    Cardiovascular: Negative for chest pain.   Gastrointestinal: Negative for abdominal pain, change in bowel habit, nausea and vomiting.   Genitourinary: Negative for dysuria.   Musculoskeletal: Positive for joint pain and joint swelling. Negative for myalgias.   Skin: Negative for rash.   Neurological: Negative for dizziness, weakness and numbness.       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    Surgical History:  Past Surgical History:   Procedure Laterality Date   • HARDWARE REMOVAL ORTHO Left 6/18/2019    Procedure: HARDWARE REMOVAL ORTHO - FIBULA;  Surgeon: Jose Ruiz M.D.;  Location: SURGERY [facility];  Service: Orthopedics   • ANKLE ARTHROSCOPY Right 12/18/2018    Procedure: ANKLE ARTHROSCOPY;  Surgeon: Jose Ruiz M.D.;  Location: SURGERY [facility];  Service: Orthopedics   • LIGAMENT REPAIR Right 12/18/2018    Procedure: LIGAMENT REPAIR- ANTERIOR TALOFIBULAR/ CALCANEOFIBULAR, POSTERIOR TIBIALIS REPAIR;  Surgeon: Jose Ruiz M.D.;  Location: SURGERY [facility];  Service: Orthopedics   • IRRIGATION & DEBRIDEMENT ORTHO Right 12/18/2018    Procedure: IRRIGATION & DEBRIDEMENT ORTHO-POSTERIOR TIBIALIS DEBRIDEMENT;  Surgeon: Jose Ruiz M.D.;  Location: SURGERY [facility];  Service: Orthopedics   • ANKLE ORIF Left 9/2/2018    Procedure: ANKLE ORIF;  Surgeon: Dayday Jaramillo M.D.;  Location: SURGERY [facility];  Service: Orthopedics   • BLADDER SUSPENSION  6/27/2018    Procedure: BLADDER SUSPENSION-   FOR: TRANS OBTURATOR TAPE;  Surgeon: Chapin Banks M.D.;  Location: SURGERY SAME DAY [facility];  Service: Gynecology   • OTHER      neck abscess   • TONSILLECTOMY     • TUBAL LIGATION         Past Social Hx: Florencia Lau  reports that she has been smoking cigarettes. She has a 9.00 pack-year smoking history. She has never used smokeless tobacco. She reports that she does not drink alcohol or use drugs.     Past Family Hx:  Florencia Lau family history includes Allergies in an other family member; Diabetes in an other family member; Other in an other family member.     Problem list, medications, and allergies reviewed by myself today in Epic.     Objective:     BP (!) 96/58   Pulse 78   Temp 36.6 °C (97.8 °F) (Temporal)   Resp 12   Ht 1.676 m (5' 6\")   Wt 68.9 kg (152 " lb)   SpO2 97%   BMI 24.53 kg/m²     Physical Exam  Vitals signs and nursing note reviewed.   Constitutional:       General: She is not in acute distress.     Appearance: She is well-developed.   HENT:      Head: Normocephalic and atraumatic.      Right Ear: External ear normal.      Left Ear: External ear normal.      Nose: Nose normal.      Mouth/Throat:      Mouth: Mucous membranes are moist.   Eyes:      Conjunctiva/sclera: Conjunctivae normal.   Cardiovascular:      Rate and Rhythm: Normal rate.   Pulmonary:      Effort: Pulmonary effort is normal. No respiratory distress.      Breath sounds: Normal breath sounds.   Abdominal:      General: There is no distension.   Musculoskeletal:      Left foot: Decreased range of motion. Normal capillary refill. Tenderness, bony tenderness and swelling present. No crepitus or deformity.        Feet:    Skin:     General: Skin is warm and dry.   Neurological:      General: No focal deficit present.      Mental Status: She is alert and oriented to person, place, and time. Mental status is at baseline.      Gait: Gait (gait at baseline) normal.   Psychiatric:         Judgment: Judgment normal.         Assessment/Plan:     Diagnosis and associated orders:     1. Left foot pain  DX-FOOT-COMPLETE 3+ LEFT   2. Contusion of left foot, initial encounter          Comments/MDM:     • Xray results  No evidence of fracture.    No fracture noted on x-ray.  Patient does have a history of ORIF of the left ankle she does have a tall walking boot at home.  Encourage her to use for 1 week to help alleviate some of her pain and discomfort.  Encouraged to rest, ice, elevate when at rest. Advised may take 600-800 mg ibuprofen 3 times daily as needed for pain.   Differential diagnosis, natural history, supportive care, and indications for immediate follow-up discussed.                  Please note that this dictation was created using voice recognition software. I have made a reasonable attempt  to correct obvious errors, but I expect that there are errors of grammar and possibly content that I did not discover before finalizing the note.    This note was electronically signed by Ari HWANG.

## 2021-01-07 ENCOUNTER — HOSPITAL ENCOUNTER (OUTPATIENT)
Facility: MEDICAL CENTER | Age: 41
End: 2021-01-07
Attending: PHYSICIAN ASSISTANT
Payer: COMMERCIAL

## 2021-01-07 ENCOUNTER — OFFICE VISIT (OUTPATIENT)
Dept: URGENT CARE | Facility: CLINIC | Age: 41
End: 2021-01-07
Payer: COMMERCIAL

## 2021-01-07 VITALS
RESPIRATION RATE: 16 BRPM | OXYGEN SATURATION: 97 % | TEMPERATURE: 97.9 F | SYSTOLIC BLOOD PRESSURE: 102 MMHG | HEART RATE: 78 BPM | BODY MASS INDEX: 24.17 KG/M2 | DIASTOLIC BLOOD PRESSURE: 80 MMHG | HEIGHT: 67 IN | WEIGHT: 154 LBS

## 2021-01-07 DIAGNOSIS — J06.9 VIRAL URI: ICD-10-CM

## 2021-01-07 PROCEDURE — 99214 OFFICE O/P EST MOD 30 MIN: CPT | Performed by: PHYSICIAN ASSISTANT

## 2021-01-07 PROCEDURE — U0005 INFEC AGEN DETEC AMPLI PROBE: HCPCS

## 2021-01-07 PROCEDURE — U0003 INFECTIOUS AGENT DETECTION BY NUCLEIC ACID (DNA OR RNA); SEVERE ACUTE RESPIRATORY SYNDROME CORONAVIRUS 2 (SARS-COV-2) (CORONAVIRUS DISEASE [COVID-19]), AMPLIFIED PROBE TECHNIQUE, MAKING USE OF HIGH THROUGHPUT TECHNOLOGIES AS DESCRIBED BY CMS-2020-01-R: HCPCS

## 2021-01-07 RX ORDER — KETOROLAC TROMETHAMINE 30 MG/ML
60 INJECTION, SOLUTION INTRAMUSCULAR; INTRAVENOUS ONCE
Status: COMPLETED | OUTPATIENT
Start: 2021-01-07 | End: 2021-01-07

## 2021-01-07 RX ORDER — ALBUTEROL SULFATE 90 UG/1
1-2 AEROSOL, METERED RESPIRATORY (INHALATION) EVERY 6 HOURS PRN
Qty: 8.5 G | Refills: 0 | Status: SHIPPED | OUTPATIENT
Start: 2021-01-07 | End: 2021-07-05

## 2021-01-07 RX ADMIN — KETOROLAC TROMETHAMINE 60 MG: 30 INJECTION, SOLUTION INTRAMUSCULAR; INTRAVENOUS at 18:58

## 2021-01-07 ASSESSMENT — ENCOUNTER SYMPTOMS
FEVER: 0
HEADACHES: 1
SPUTUM PRODUCTION: 0
ABDOMINAL PAIN: 0
VOMITING: 0
MYALGIAS: 1
SWEATS: 0
RHINORRHEA: 0
HEMOPTYSIS: 0
WHEEZING: 1
COUGH: 1
SORE THROAT: 1
SHORTNESS OF BREATH: 1
DIARRHEA: 0
NAUSEA: 1
PALPITATIONS: 0
SINUS PAIN: 0
CHILLS: 1

## 2021-01-07 ASSESSMENT — FIBROSIS 4 INDEX: FIB4 SCORE: 0.62

## 2021-01-08 DIAGNOSIS — J06.9 VIRAL URI: ICD-10-CM

## 2021-01-08 NOTE — PROGRESS NOTES
"Subjective:      Florencia Lau is a 40 y.o. female who presents with Cough (sob, headache, fatigue, runny nose, x4 days )            Cough  This is a new problem. Episode onset: 4 days. The problem has been unchanged. The cough is non-productive. Associated symptoms include chills, headaches, myalgias, a sore throat, shortness of breath and wheezing. Pertinent negatives include no chest pain, ear congestion, ear pain, fever, hemoptysis, nasal congestion, postnasal drip, rash, rhinorrhea or sweats. Nothing aggravates the symptoms. Risk factors for lung disease include smoking/tobacco exposure. She has tried OTC cough suppressant for the symptoms. The treatment provided mild relief. Her past medical history is significant for bronchitis. There is no history of asthma or pneumonia.     Past Medical History:   Diagnosis Date   • Anxiety    • Anxiety and depression 06/12/2018   • Back pain    • Bowel habit changes 06/12/2018    \"Constipation/Diarrhea\"   • Bronchitis     HX OF   • Depression    • Dysfunctional uterine bleeding    • Fibromyalgia    • Hayfever 06/12/2018   • Indigestion 06/12/2018   • Insomnia    • Muscle spasm 06/12/2018    \"My neurologist told me I have a muscle spasm disorder\"   • Neck pain    • Panic attacks    • Shoulder pain    • TMJ syndrome        Past Surgical History:   Procedure Laterality Date   • HARDWARE REMOVAL ORTHO Left 6/18/2019    Procedure: HARDWARE REMOVAL ORTHO - FIBULA;  Surgeon: Jose Ruiz M.D.;  Location: [facility];  Service: Orthopedics   • ANKLE ARTHROSCOPY Right 12/18/2018    Procedure: ANKLE ARTHROSCOPY;  Surgeon: Jose Ruiz M.D.;  Location: [facility];  Service: Orthopedics   • LIGAMENT REPAIR Right 12/18/2018    Procedure: LIGAMENT REPAIR- ANTERIOR TALOFIBULAR/ CALCANEOFIBULAR, POSTERIOR TIBIALIS REPAIR;  Surgeon: Jose Ruiz M.D.;  Location: [facility];  Service: Orthopedics   • IRRIGATION & DEBRIDEMENT " "ORTHO Right 12/18/2018    Procedure: IRRIGATION & DEBRIDEMENT ORTHO-POSTERIOR TIBIALIS DEBRIDEMENT;  Surgeon: Jose Ruiz M.D.;  Location: SURGERY [facility];  Service: Orthopedics   • ANKLE ORIF Left 9/2/2018    Procedure: ANKLE ORIF;  Surgeon: Dayday Jaramillo M.D.;  Location: SURGERY [facility];  Service: Orthopedics   • BLADDER SUSPENSION  6/27/2018    Procedure: BLADDER SUSPENSION-   FOR: TRANS OBTURATOR TAPE;  Surgeon: Chapin Banks M.D.;  Location: SURGERY SAME DAY [facility];  Service: Gynecology   • OTHER      neck abscess   • TONSILLECTOMY     • TUBAL LIGATION         Family History   Problem Relation Age of Onset   • Diabetes Other    • Allergies Other         RA   • Other Other         DIVERTICULITIS, LUPUS, DDD, FM       Allergies   Allergen Reactions   • Latex Rash   • Sulfa Drugs Unspecified     \"Muscle spasm\".       • Codeine Unspecified     \"Childhood allergie, not sure what happens\".     • Morphine Itching       Medications, Allergies, and current problem list reviewed today in Epic      Review of Systems   Constitutional: Positive for chills and malaise/fatigue. Negative for fever.   HENT: Positive for sore throat. Negative for congestion, ear discharge, ear pain, postnasal drip, rhinorrhea and sinus pain.    Respiratory: Positive for cough, shortness of breath and wheezing. Negative for hemoptysis and sputum production.    Cardiovascular: Negative for chest pain, palpitations and leg swelling.   Gastrointestinal: Positive for nausea. Negative for abdominal pain, diarrhea and vomiting.   Musculoskeletal: Positive for myalgias.   Skin: Negative for rash.   Neurological: Positive for headaches.     All other systems reviewed and are negative.        Objective:     /80 (BP Location: Right arm, Patient Position: Sitting, BP Cuff Size: Adult long)   Pulse 78   Temp 36.6 °C (97.9 °F) (Temporal)   Resp 16   Ht 1.702 m (5' 7\")   Wt 69.9 kg (154 lb)   SpO2 97%   BMI " 24.12 kg/m²      Physical Exam  Constitutional:       General: She is not in acute distress.     Appearance: She is not ill-appearing.   HENT:      Head: Normocephalic and atraumatic.      Nose: Nose normal.      Mouth/Throat:      Mouth: Mucous membranes are moist.      Pharynx: No posterior oropharyngeal erythema.   Eyes:      Conjunctiva/sclera: Conjunctivae normal.   Cardiovascular:      Rate and Rhythm: Normal rate and regular rhythm.      Heart sounds: Normal heart sounds. No murmur. No friction rub. No gallop.    Pulmonary:      Effort: Pulmonary effort is normal. No respiratory distress.      Breath sounds: Normal breath sounds. No wheezing, rhonchi or rales.      Comments: Spasmodic cough  Musculoskeletal: Normal range of motion.   Skin:     General: Skin is warm and dry.      Findings: No rash.   Neurological:      General: No focal deficit present.      Mental Status: She is alert and oriented to person, place, and time.   Psychiatric:         Mood and Affect: Mood normal.         Behavior: Behavior normal.         Thought Content: Thought content normal.         Judgment: Judgment normal.                 Assessment/Plan:        1. Viral URI    - COVID/SARS CoV-2 PCR; Future  - ketorolac (TORADOL) injection 60 mg  - albuterol 108 (90 Base) MCG/ACT Aero Soln inhalation aerosol; Inhale 1-2 Puffs every 6 hours as needed for Shortness of Breath.  Dispense: 8.5 g; Refill: 0    COVID test pending.  .Isolation guidelines, conservative measures and ER precautions discussed.   COVID handout given    This has been or will be performed: Review of prior external notes from unique source, review of results from unique test, and ordered a unique test.       Differential diagnoses, Supportive care, and indications for immediate follow-up discussed with patient.   Pathogenesis of diagnosis discussed including typical length and natural progression.   Instructed to return to clinic or nearest emergency department for any  change in condition, further concerns, or worsening of symptoms.    The patient demonstrated a good understanding and agreed with the treatment plan.    Nicki Guerra P.A.-C.

## 2021-01-09 ENCOUNTER — PATIENT MESSAGE (OUTPATIENT)
Dept: URGENT CARE | Facility: CLINIC | Age: 41
End: 2021-01-09

## 2021-01-09 DIAGNOSIS — J06.9 UPPER RESPIRATORY TRACT INFECTION, UNSPECIFIED TYPE: ICD-10-CM

## 2021-01-10 RX ORDER — METHYLPREDNISOLONE 4 MG/1
TABLET ORAL
Qty: 21 TAB | Refills: 0 | Status: SHIPPED | OUTPATIENT
Start: 2021-01-10 | End: 2021-06-08

## 2021-02-03 ENCOUNTER — APPOINTMENT (OUTPATIENT)
Dept: RADIOLOGY | Facility: IMAGING CENTER | Age: 41
End: 2021-02-03
Attending: PHYSICIAN ASSISTANT
Payer: COMMERCIAL

## 2021-02-03 ENCOUNTER — OFFICE VISIT (OUTPATIENT)
Dept: URGENT CARE | Facility: CLINIC | Age: 41
End: 2021-02-03
Payer: COMMERCIAL

## 2021-02-03 VITALS
WEIGHT: 155 LBS | BODY MASS INDEX: 24.33 KG/M2 | HEIGHT: 67 IN | OXYGEN SATURATION: 97 % | HEART RATE: 80 BPM | SYSTOLIC BLOOD PRESSURE: 110 MMHG | DIASTOLIC BLOOD PRESSURE: 78 MMHG | TEMPERATURE: 97.2 F | RESPIRATION RATE: 14 BRPM

## 2021-02-03 DIAGNOSIS — R05.9 COUGH: ICD-10-CM

## 2021-02-03 DIAGNOSIS — J40 BRONCHITIS: ICD-10-CM

## 2021-02-03 DIAGNOSIS — J01.40 ACUTE PANSINUSITIS, RECURRENCE NOT SPECIFIED: ICD-10-CM

## 2021-02-03 DIAGNOSIS — M79.7 FIBROMYALGIA: ICD-10-CM

## 2021-02-03 PROCEDURE — 99214 OFFICE O/P EST MOD 30 MIN: CPT | Mod: 25 | Performed by: PHYSICIAN ASSISTANT

## 2021-02-03 PROCEDURE — 99999 PR NO CHARGE: CPT | Performed by: PHYSICIAN ASSISTANT

## 2021-02-03 PROCEDURE — 71046 X-RAY EXAM CHEST 2 VIEWS: CPT | Mod: TC | Performed by: PHYSICIAN ASSISTANT

## 2021-02-03 RX ORDER — DOXYCYCLINE HYCLATE 100 MG
100 TABLET ORAL 2 TIMES DAILY
Qty: 20 TAB | Refills: 0 | Status: SHIPPED | OUTPATIENT
Start: 2021-02-03 | End: 2021-02-13

## 2021-02-03 RX ORDER — KETOROLAC TROMETHAMINE 30 MG/ML
60 INJECTION, SOLUTION INTRAMUSCULAR; INTRAVENOUS ONCE
Status: COMPLETED | OUTPATIENT
Start: 2021-02-03 | End: 2021-02-03

## 2021-02-03 RX ORDER — PREDNISONE 20 MG/1
TABLET ORAL
Qty: 10 TAB | Refills: 0 | Status: SHIPPED | OUTPATIENT
Start: 2021-02-03 | End: 2021-06-08

## 2021-02-03 RX ORDER — BENZONATATE 200 MG/1
200 CAPSULE ORAL 3 TIMES DAILY PRN
Qty: 30 CAP | Refills: 0 | Status: SHIPPED | OUTPATIENT
Start: 2021-02-03 | End: 2021-06-08

## 2021-02-03 RX ORDER — FLUCONAZOLE 150 MG/1
150 TABLET ORAL ONCE
Qty: 2 TAB | Refills: 0 | Status: SHIPPED | OUTPATIENT
Start: 2021-02-03 | End: 2021-02-03

## 2021-02-03 RX ADMIN — KETOROLAC TROMETHAMINE 60 MG: 30 INJECTION, SOLUTION INTRAMUSCULAR; INTRAVENOUS at 20:44

## 2021-02-03 ASSESSMENT — ENCOUNTER SYMPTOMS
VOMITING: 0
SORE THROAT: 0
SHORTNESS OF BREATH: 1
TINGLING: 0
SINUS PAIN: 1
DIARRHEA: 0
SPUTUM PRODUCTION: 0
WHEEZING: 1
FOCAL WEAKNESS: 0
MYALGIAS: 0
HEADACHES: 0
FEVER: 0
PALPITATIONS: 0
HEMOPTYSIS: 0
CHILLS: 0
COUGH: 1
NAUSEA: 0
SENSORY CHANGE: 0
ABDOMINAL PAIN: 0

## 2021-02-03 ASSESSMENT — FIBROSIS 4 INDEX: FIB4 SCORE: 0.62

## 2021-02-04 NOTE — PROGRESS NOTES
"Subjective:      Florencia Lau is a 40 y.o. female who presents with Coronavirus Screening (cough with chest pain, congestion and SOB, same symptoms as seen on 1.07.21)            The patient is here for follow-up on a cough that has lasted over 1 month. She was seen 1 month ago and diagnosed with viral URI. Her COVID test came back negative. I prescribed her Medrol dosepak. She states her symptoms improved but never went away. She now has sinus congestion, sinus pressure, constant cough, shortness of breath, burning in lungs and wheezing. She is a smoker. She has been using her albuterol inhaler with mild relief. She denies any hemoptysis or lower extremity pain or swelling. No recent surgery, hx of PE or DVT. No recent COVID illness. She also has greenish nasal drainage.     She also has hx of fibromyalgia and has chronic pain. She is requesting a Toradol injection today.    Past Medical History:   Diagnosis Date   • Anxiety    • Anxiety and depression 06/12/2018   • Back pain    • Bowel habit changes 06/12/2018    \"Constipation/Diarrhea\"   • Bronchitis     HX OF   • Depression    • Dysfunctional uterine bleeding    • Fibromyalgia    • Hayfever 06/12/2018   • Indigestion 06/12/2018   • Insomnia    • Muscle spasm 06/12/2018    \"My neurologist told me I have a muscle spasm disorder\"   • Neck pain    • Panic attacks    • Shoulder pain    • TMJ syndrome        Past Surgical History:   Procedure Laterality Date   • HARDWARE REMOVAL ORTHO Left 6/18/2019    Procedure: HARDWARE REMOVAL ORTHO - FIBULA;  Surgeon: Jose Ruiz M.D.;  Location: SURGERY [facility];  Service: Orthopedics   • ANKLE ARTHROSCOPY Right 12/18/2018    Procedure: ANKLE ARTHROSCOPY;  Surgeon: Jose Ruiz M.D.;  Location: SURGERY [facility];  Service: Orthopedics   • LIGAMENT REPAIR Right 12/18/2018    Procedure: LIGAMENT REPAIR- ANTERIOR TALOFIBULAR/ CALCANEOFIBULAR, POSTERIOR TIBIALIS REPAIR;  Surgeon: Jose Ruiz, " "M.D.;  Location: SURGERY [facility];  Service: Orthopedics   • IRRIGATION & DEBRIDEMENT ORTHO Right 12/18/2018    Procedure: IRRIGATION & DEBRIDEMENT ORTHO-POSTERIOR TIBIALIS DEBRIDEMENT;  Surgeon: Jose Ruiz M.D.;  Location: SURGERY [facility];  Service: Orthopedics   • ANKLE ORIF Left 9/2/2018    Procedure: ANKLE ORIF;  Surgeon: Dayday Jaramillo M.D.;  Location: SURGERY [facility];  Service: Orthopedics   • BLADDER SUSPENSION  6/27/2018    Procedure: BLADDER SUSPENSION-   FOR: TRANS OBTURATOR TAPE;  Surgeon: Chapin Banks M.D.;  Location: SURGERY SAME DAY [facility];  Service: Gynecology   • OTHER      neck abscess   • TONSILLECTOMY     • TUBAL LIGATION         Family History   Problem Relation Age of Onset   • Diabetes Other    • Allergies Other         RA   • Other Other         DIVERTICULITIS, LUPUS, DDD, FM       Allergies   Allergen Reactions   • Latex Rash   • Sulfa Drugs Unspecified     \"Muscle spasm\".       • Codeine Unspecified     \"Childhood allergie, not sure what happens\".     • Morphine Itching       Medications, Allergies, and current problem list reviewed today in Epic      Review of Systems   Constitutional: Positive for malaise/fatigue. Negative for chills and fever.   HENT: Positive for congestion and sinus pain. Negative for sore throat.    Respiratory: Positive for cough, shortness of breath and wheezing. Negative for hemoptysis and sputum production.    Cardiovascular: Negative for chest pain, palpitations and leg swelling.   Gastrointestinal: Negative for abdominal pain, diarrhea, nausea and vomiting.   Musculoskeletal: Negative for myalgias.   Skin: Negative for rash.   Neurological: Negative for tingling, sensory change, focal weakness and headaches.     All other systems reviewed and are negative.        Objective:     /78   Pulse 80   Temp 36.2 °C (97.2 °F) (Temporal)   Resp 14   Ht 1.702 m (5' 7\")   Wt 70.3 kg (155 lb)   SpO2 97%   BMI 24.28 " kg/m²      Physical Exam  Constitutional:       General: She is not in acute distress.     Appearance: She is not ill-appearing.   HENT:      Head: Normocephalic and atraumatic.      Nose: Congestion and rhinorrhea present.      Right Sinus: Maxillary sinus tenderness and frontal sinus tenderness present.      Left Sinus: Maxillary sinus tenderness and frontal sinus tenderness present.      Mouth/Throat:      Mouth: Mucous membranes are moist.      Pharynx: No posterior oropharyngeal erythema.   Eyes:      Conjunctiva/sclera: Conjunctivae normal.   Cardiovascular:      Rate and Rhythm: Normal rate and regular rhythm.      Heart sounds: Normal heart sounds. No murmur.   Pulmonary:      Effort: Pulmonary effort is normal. No respiratory distress.      Breath sounds: Normal breath sounds. No wheezing, rhonchi or rales.   Musculoskeletal: Normal range of motion.   Lymphadenopathy:      Cervical: No cervical adenopathy.   Skin:     General: Skin is warm and dry.      Findings: No rash.   Neurological:      General: No focal deficit present.      Mental Status: She is alert and oriented to person, place, and time.   Psychiatric:         Mood and Affect: Mood normal.         Behavior: Behavior normal.         Thought Content: Thought content normal.         Judgment: Judgment normal.          HISTORY/REASON FOR EXAM:  Cough.        TECHNIQUE/EXAM DESCRIPTION: Two views of the chest,  2/3/2021 7:50 PM     COMPARISON:  8/20/2020     FINDINGS:   The cardiomediastinal silhouettes, taylor, and pulmonary vessels are normal.  The lungs show no mass, infiltrate, or other acute process.  The bony thorax is intact.     IMPRESSION:     Normal chest.          Assessment/Plan:        1. Bronchitis    2. Acute pansinusitis, recurrence not specified    - DX-CHEST-2 VIEWS; Future  - doxycycline (VIBRAMYCIN) 100 MG Tab; Take 1 Tab by mouth 2 times a day for 10 days.  Dispense: 20 Tab; Refill: 0  - predniSONE (DELTASONE) 20 MG Tab; 2 tabs by  mouth daily x 5 days.  Dispense: 10 Tab; Refill: 0  - fluconazole (DIFLUCAN) 150 MG tablet; Take 1 Tab by mouth one time for 1 dose. May repeat in 3 days if symptoms persist.  Dispense: 2 Tab; Refill: 0  - benzonatate (TESSALON) 200 MG capsule; Take 1 Cap by mouth 3 times a day as needed for Cough.  Dispense: 30 Cap; Refill: 0    3. Fibromyalgia  - ketorolac (TORADOL) injection 60 mg    Differential diagnoses, Supportive care, and indications for immediate follow-up discussed with patient.   Pathogenesis of diagnosis discussed including typical length and natural progression.   Instructed to return to clinic or nearest emergency department for any change in condition, further concerns, or worsening of symptoms.    The patient demonstrated a good understanding and agreed with the treatment plan.    Nicki Guerra P.A.-C.

## 2021-05-12 ENCOUNTER — HOSPITAL ENCOUNTER (OUTPATIENT)
Facility: MEDICAL CENTER | Age: 41
End: 2021-05-12
Attending: PHYSICIAN ASSISTANT
Payer: COMMERCIAL

## 2021-05-12 ENCOUNTER — OFFICE VISIT (OUTPATIENT)
Dept: URGENT CARE | Facility: CLINIC | Age: 41
End: 2021-05-12
Payer: COMMERCIAL

## 2021-05-12 VITALS
HEART RATE: 100 BPM | RESPIRATION RATE: 16 BRPM | TEMPERATURE: 97.5 F | BODY MASS INDEX: 24.33 KG/M2 | OXYGEN SATURATION: 97 % | DIASTOLIC BLOOD PRESSURE: 80 MMHG | SYSTOLIC BLOOD PRESSURE: 120 MMHG | WEIGHT: 155 LBS | HEIGHT: 67 IN

## 2021-05-12 DIAGNOSIS — Z71.1 CONCERN ABOUT STD IN FEMALE WITHOUT DIAGNOSIS: ICD-10-CM

## 2021-05-12 DIAGNOSIS — N89.8 VAGINAL DISCHARGE: ICD-10-CM

## 2021-05-12 DIAGNOSIS — M25.50 PAIN IN JOINT INVOLVING MULTIPLE SITES: ICD-10-CM

## 2021-05-12 PROCEDURE — 87591 N.GONORRHOEAE DNA AMP PROB: CPT

## 2021-05-12 PROCEDURE — 87510 GARDNER VAG DNA DIR PROBE: CPT

## 2021-05-12 PROCEDURE — 87480 CANDIDA DNA DIR PROBE: CPT

## 2021-05-12 PROCEDURE — 87491 CHLMYD TRACH DNA AMP PROBE: CPT

## 2021-05-12 PROCEDURE — 99213 OFFICE O/P EST LOW 20 MIN: CPT | Performed by: PHYSICIAN ASSISTANT

## 2021-05-12 PROCEDURE — 87660 TRICHOMONAS VAGIN DIR PROBE: CPT

## 2021-05-12 RX ORDER — KETOROLAC TROMETHAMINE 30 MG/ML
30 INJECTION, SOLUTION INTRAMUSCULAR; INTRAVENOUS ONCE
Status: COMPLETED | OUTPATIENT
Start: 2021-05-12 | End: 2021-05-12

## 2021-05-12 RX ORDER — AZITHROMYCIN 500 MG/1
1000 TABLET, FILM COATED ORAL DAILY
Qty: 2 TABLET | Refills: 0 | Status: SHIPPED | OUTPATIENT
Start: 2021-05-12 | End: 2021-06-08

## 2021-05-12 RX ORDER — FLUCONAZOLE 150 MG/1
150 TABLET ORAL
Qty: 3 TABLET | Refills: 0 | Status: SHIPPED | OUTPATIENT
Start: 2021-05-12 | End: 2021-06-08

## 2021-05-12 RX ADMIN — KETOROLAC TROMETHAMINE 30 MG: 30 INJECTION, SOLUTION INTRAMUSCULAR; INTRAVENOUS at 19:13

## 2021-05-12 ASSESSMENT — FIBROSIS 4 INDEX: FIB4 SCORE: 0.64

## 2021-05-13 DIAGNOSIS — N89.8 VAGINAL DISCHARGE: ICD-10-CM

## 2021-05-13 DIAGNOSIS — B96.89 BACTERIAL VAGINOSIS: ICD-10-CM

## 2021-05-13 DIAGNOSIS — N76.0 BACTERIAL VAGINOSIS: ICD-10-CM

## 2021-05-13 RX ORDER — METRONIDAZOLE 500 MG/1
500 TABLET ORAL 2 TIMES DAILY
Qty: 14 TABLET | Refills: 0 | Status: SHIPPED | OUTPATIENT
Start: 2021-05-13 | End: 2021-05-20

## 2021-05-13 ASSESSMENT — ENCOUNTER SYMPTOMS
NAUSEA: 0
DIZZINESS: 0
VOMITING: 0
ABDOMINAL PAIN: 1
FEVER: 0
DIARRHEA: 0
WEIGHT LOSS: 0
MYALGIAS: 1
HEADACHES: 0
FLANK PAIN: 0
CHILLS: 0

## 2021-05-13 NOTE — PROGRESS NOTES
Subjective:   Florencia Lau is a 41 y.o. female who presents for Vaginal Discharge (x 2 wks, vaginal discharge) and Wrist Pain (x 2 wks, bilateral wrist pain, hands, hips, upper leg pain and join pain)      HPI  41 y.o. female presents to urgent care with new problem to provider of malodorous vaginal discharge onset about 2 week ago after unprotected sexual intercourse. Report mild dysuria at beginning of urine stream.  She denies flank pain, hematuria, abdominal pain, vomiting, or fevers.  Mild suprapubic abdominal pain.  No recent history of STIs.  Patient denies pregnancy.  History of fibromyalgia, patient reports increasing diffuse joint pain since starting to garden.  She denies redness or warmth associated with joint pain.   Denies other associated aggravating or alleviating factors.     Review of Systems   Constitutional: Positive for malaise/fatigue. Negative for chills, fever and weight loss.   Gastrointestinal: Positive for abdominal pain. Negative for diarrhea, nausea and vomiting.   Genitourinary: Positive for dysuria. Negative for flank pain, frequency, hematuria and urgency.        Vaginal discharge   Musculoskeletal: Positive for joint pain and myalgias.   Neurological: Negative for dizziness and headaches.   Endo/Heme/Allergies: Negative for environmental allergies.       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   • Nondisplaced fracture of medial malleolus of left tibia   • Left ankle pain     Past Surgical History:   Procedure Laterality Date   • HARDWARE REMOVAL ORTHO Left 6/18/2019    Procedure: HARDWARE REMOVAL ORTHO - FIBULA;  Surgeon: Jose Ruiz,  "M.D.;  Location: SURGERY [facility];  Service: Orthopedics   • ANKLE ARTHROSCOPY Right 12/18/2018    Procedure: ANKLE ARTHROSCOPY;  Surgeon: Jose Ruiz M.D.;  Location: SURGERY [facility];  Service: Orthopedics   • LIGAMENT REPAIR Right 12/18/2018    Procedure: LIGAMENT REPAIR- ANTERIOR TALOFIBULAR/ CALCANEOFIBULAR, POSTERIOR TIBIALIS REPAIR;  Surgeon: Jose Ruiz M.D.;  Location: SURGERY [facility];  Service: Orthopedics   • IRRIGATION & DEBRIDEMENT ORTHO Right 12/18/2018    Procedure: IRRIGATION & DEBRIDEMENT ORTHO-POSTERIOR TIBIALIS DEBRIDEMENT;  Surgeon: Jose Ruiz M.D.;  Location: SURGERY [facility];  Service: Orthopedics   • ANKLE ORIF Left 9/2/2018    Procedure: ANKLE ORIF;  Surgeon: Dayday Jaramillo M.D.;  Location: SURGERY [facility];  Service: Orthopedics   • BLADDER SUSPENSION  6/27/2018    Procedure: BLADDER SUSPENSION-   FOR: TRANS OBTURATOR TAPE;  Surgeon: Chapin Banks M.D.;  Location: SURGERY [facility];  Service: Gynecology   • OTHER      neck abscess   • TONSILLECTOMY     • TUBAL LIGATION       Social History     Tobacco Use   • Smoking status: Current Every Day Smoker     Packs/day: 0.50     Years: 18.00     Pack years: 9.00     Types: Cigarettes   • Smokeless tobacco: Never Used   • Tobacco comment: 1 PK per day   Vaping Use   • Vaping Use: Never used   Substance Use Topics   • Alcohol use: No   • Drug use: No      Family History   Problem Relation Age of Onset   • Diabetes Other    • Allergies Other         RA   • Other Other         DIVERTICULITIS, LUPUS, DDD, FM      (Allergies, Medications, & Tobacco/Substance Use were reconciled by the Medical Assistant and reviewed by myself. The family history is prepopulated)     Objective:     /80 (BP Location: Left arm, Patient Position: Sitting, BP Cuff Size: Adult)   Pulse 100   Temp 36.4 °C (97.5 °F) (Temporal)   Resp 16   Ht 1.702 m (5' 7\")   Wt 70.3 kg (155 lb)   SpO2 97%   " BMI 24.28 kg/m²     Physical Exam  Vitals reviewed.   Constitutional:       General: She is not in acute distress.     Appearance: Normal appearance. She is well-developed. She is not ill-appearing.   HENT:      Head: Normocephalic and atraumatic.      Mouth/Throat:      Mouth: Mucous membranes are moist.      Pharynx: Oropharynx is clear.   Eyes:      Conjunctiva/sclera: Conjunctivae normal.   Cardiovascular:      Rate and Rhythm: Normal rate and regular rhythm.      Heart sounds: Normal heart sounds.   Pulmonary:      Effort: Pulmonary effort is normal. No respiratory distress.      Breath sounds: Normal breath sounds.   Abdominal:      General: There is no distension.      Palpations: Abdomen is soft.      Comments: Mild suprapubic abdominal tenderness with no focal tenderness, rebound, or guarding   Genitourinary:     Comments: Defer  exam  Musculoskeletal:      Cervical back: Normal range of motion and neck supple.      Comments: Diffuse joint tenderness with no effusions or warmth.    Skin:     General: Skin is warm and dry.      Findings: No erythema.   Neurological:      General: No focal deficit present.      Mental Status: She is alert and oriented to person, place, and time.   Psychiatric:         Mood and Affect: Mood normal.         Behavior: Behavior normal.         Thought Content: Thought content normal.         Judgment: Judgment normal.         Assessment/Plan:     1. Vaginal discharge  VAGINAL PATHOGENS DNA PANEL    Chlamydia/GC PCR Urine Or Swab    cefTRIAXone (ROCEPHIN) 500 mg, lidocaine (XYLOCAINE) 1 % 1.8 mL for IM use    azithromycin (ZITHROMAX) 500 MG tablet    fluconazole (DIFLUCAN) 150 MG tablet   2. Concern about STD in female without diagnosis  cefTRIAXone (ROCEPHIN) 500 mg, lidocaine (XYLOCAINE) 1 % 1.8 mL for IM use    azithromycin (ZITHROMAX) 500 MG tablet   3. Pain in joint involving multiple sites  ketorolac (TORADOL) injection 30 mg     Patient treated empirically for STIs  secondary to history of recent unprotected sexual intercourse.  She was given 500 mg shot of Rocephin in clinic with azithromycin sent to pharmacy.  I recommend that patient use condoms for protection against STIs.   We will follow-up pending laboratory results.    Patient was also given 30 mg IM injection of Toradol for diffuse joint pain.  She does have a history of fibromyalgia and chronic pain syndrome.  Patient is concerned for risk of septic arthritis however on physical examination there are no exam findings consistent with this.  I recommend that patient follow-up with her primary care provider for further evaluation of diffuse joint pain.    Differential diagnosis, natural history, supportive care, and indications for immediate follow-up discussed.    Advised the patient to follow-up with the primary care physician for recheck, reevaluation, and consideration of further management.  Patient verbalized understanding of treatment plan and has no further questions regarding care.     I personally reviewed prior external notes and test results pertinent to today's visit.     Please note that this dictation was created using voice recognition software. I have made a reasonable attempt to correct obvious errors, but I expect that there are errors of grammar and possibly content that I did not discover before finalizing the note.    This note was electronically signed by Shanita Huitron PA-C

## 2021-05-21 ENCOUNTER — APPOINTMENT (OUTPATIENT)
Dept: RADIOLOGY | Facility: IMAGING CENTER | Age: 41
End: 2021-05-21
Attending: NURSE PRACTITIONER
Payer: COMMERCIAL

## 2021-05-21 ENCOUNTER — HOSPITAL ENCOUNTER (OUTPATIENT)
Facility: MEDICAL CENTER | Age: 41
End: 2021-05-21
Attending: NURSE PRACTITIONER
Payer: COMMERCIAL

## 2021-05-21 ENCOUNTER — OFFICE VISIT (OUTPATIENT)
Dept: URGENT CARE | Facility: CLINIC | Age: 41
End: 2021-05-21
Payer: COMMERCIAL

## 2021-05-21 VITALS
RESPIRATION RATE: 16 BRPM | DIASTOLIC BLOOD PRESSURE: 66 MMHG | OXYGEN SATURATION: 97 % | BODY MASS INDEX: 22.22 KG/M2 | TEMPERATURE: 97.4 F | WEIGHT: 141.6 LBS | HEART RATE: 100 BPM | SYSTOLIC BLOOD PRESSURE: 102 MMHG | HEIGHT: 67 IN

## 2021-05-21 DIAGNOSIS — R82.998 LEUKOCYTES IN URINE: ICD-10-CM

## 2021-05-21 DIAGNOSIS — L30.9 DERMATITIS: ICD-10-CM

## 2021-05-21 DIAGNOSIS — M25.562 ACUTE PAIN OF LEFT KNEE: ICD-10-CM

## 2021-05-21 DIAGNOSIS — B96.89 BACTERIAL VAGINOSIS: ICD-10-CM

## 2021-05-21 DIAGNOSIS — N76.0 BACTERIAL VAGINOSIS: ICD-10-CM

## 2021-05-21 DIAGNOSIS — R30.0 DYSURIA: ICD-10-CM

## 2021-05-21 LAB
APPEARANCE UR: CLEAR
BILIRUB UR STRIP-MCNC: NEGATIVE MG/DL
COLOR UR AUTO: YELLOW
GLUCOSE UR STRIP.AUTO-MCNC: NEGATIVE MG/DL
KETONES UR STRIP.AUTO-MCNC: NEGATIVE MG/DL
LEUKOCYTE ESTERASE UR QL STRIP.AUTO: NORMAL
NITRITE UR QL STRIP.AUTO: NEGATIVE
PH UR STRIP.AUTO: 5.5 [PH] (ref 5–8)
PROT UR QL STRIP: NEGATIVE MG/DL
RBC UR QL AUTO: NORMAL
SP GR UR STRIP.AUTO: 1.02
UROBILINOGEN UR STRIP-MCNC: 0.2 MG/DL

## 2021-05-21 PROCEDURE — 87086 URINE CULTURE/COLONY COUNT: CPT

## 2021-05-21 PROCEDURE — 73562 X-RAY EXAM OF KNEE 3: CPT | Mod: TC,LT | Performed by: NURSE PRACTITIONER

## 2021-05-21 PROCEDURE — 81002 URINALYSIS NONAUTO W/O SCOPE: CPT | Performed by: NURSE PRACTITIONER

## 2021-05-21 PROCEDURE — 99214 OFFICE O/P EST MOD 30 MIN: CPT | Performed by: NURSE PRACTITIONER

## 2021-05-21 RX ORDER — TRIAMCINOLONE ACETONIDE 1 MG/G
1 CREAM TOPICAL 2 TIMES DAILY
Qty: 15 G | Refills: 0 | Status: SHIPPED | OUTPATIENT
Start: 2021-05-21 | End: 2021-06-04

## 2021-05-21 RX ORDER — METRONIDAZOLE 7.5 MG/G
1 GEL VAGINAL
Qty: 5 EACH | Refills: 0 | Status: SHIPPED | OUTPATIENT
Start: 2021-05-21 | End: 2021-05-26

## 2021-05-21 ASSESSMENT — FIBROSIS 4 INDEX: FIB4 SCORE: 0.64

## 2021-05-21 NOTE — PATIENT INSTRUCTIONS
Bacterial Vaginosis    Bacterial vaginosis is a vaginal infection that occurs when the normal balance of bacteria in the vagina is disrupted. It results from an overgrowth of certain bacteria. This is the most common vaginal infection among women ages 15-44.  Because bacterial vaginosis increases your risk for STIs (sexually transmitted infections), getting treated can help reduce your risk for chlamydia, gonorrhea, herpes, and HIV (human immunodeficiency virus). Treatment is also important for preventing complications in pregnant women, because this condition can cause an early (premature) delivery.  What are the causes?  This condition is caused by an increase in harmful bacteria that are normally present in small amounts in the vagina. However, the reason that the condition develops is not fully understood.  What increases the risk?  The following factors may make you more likely to develop this condition:  · Having a new sexual partner or multiple sexual partners.  · Having unprotected sex.  · Douching.  · Having an intrauterine device (IUD).  · Smoking.  · Drug and alcohol abuse.  · Taking certain antibiotic medicines.  · Being pregnant.  You cannot get bacterial vaginosis from toilet seats, bedding, swimming pools, or contact with objects around you.  What are the signs or symptoms?  Symptoms of this condition include:  · Grey or white vaginal discharge. The discharge can also be watery or foamy.  · A fish-like odor with discharge, especially after sexual intercourse or during menstruation.  · Itching in and around the vagina.  · Burning or pain with urination.  Some women with bacterial vaginosis have no signs or symptoms.  How is this diagnosed?  This condition is diagnosed based on:  · Your medical history.  · A physical exam of the vagina.  · Testing a sample of vaginal fluid under a microscope to look for a large amount of bad bacteria or abnormal cells. Your health care provider may use a cotton swab or  a small wooden spatula to collect the sample.  How is this treated?  This condition is treated with antibiotics. These may be given as a pill, a vaginal cream, or a medicine that is put into the vagina (suppository). If the condition comes back after treatment, a second round of antibiotics may be needed.  Follow these instructions at home:  Medicines  · Take over-the-counter and prescription medicines only as told by your health care provider.  · Take or use your antibiotic as told by your health care provider. Do not stop taking or using the antibiotic even if you start to feel better.  General instructions  · If you have a female sexual partner, tell her that you have a vaginal infection. She should see her health care provider and be treated if she has symptoms. If you have a male sexual partner, he does not need treatment.  · During treatment:  ? Avoid sexual activity until you finish treatment.  ? Do not douche.  ? Avoid alcohol as directed by your health care provider.  ? Avoid breastfeeding as directed by your health care provider.  · Drink enough water and fluids to keep your urine clear or pale yellow.  · Keep the area around your vagina and rectum clean.  ? Wash the area daily with warm water.  ? Wipe yourself from front to back after using the toilet.  · Keep all follow-up visits as told by your health care provider. This is important.  How is this prevented?  · Do not douche.  · Wash the outside of your vagina with warm water only.  · Use protection when having sex. This includes latex condoms and dental dams.  · Limit how many sexual partners you have. To help prevent bacterial vaginosis, it is best to have sex with just one partner (monogamous).  · Make sure you and your sexual partner are tested for STIs.  · Wear cotton or cotton-lined underwear.  · Avoid wearing tight pants and pantyhose, especially during summer.  · Limit the amount of alcohol that you drink.  · Do not use any products that contain  nicotine or tobacco, such as cigarettes and e-cigarettes. If you need help quitting, ask your health care provider.  · Do not use illegal drugs.  Where to find more information  · Centers for Disease Control and Prevention: www.cdc.gov/std  · American Sexual Health Association (PADMINI): www.ashastd.org  · [place] Department of Health and Human Services, Office on Women's Health: www.womenshealth.gov/ or https://www.womenshealth.gov/a-z-topics/bacterial-vaginosis  Contact a health care provider if:  · Your symptoms do not improve, even after treatment.  · You have more discharge or pain when urinating.  · You have a fever.  · You have pain in your abdomen.  · You have pain during sex.  · You have vaginal bleeding between periods.  Summary  · Bacterial vaginosis is a vaginal infection that occurs when the normal balance of bacteria in the vagina is disrupted.  · Because bacterial vaginosis increases your risk for STIs (sexually transmitted infections), getting treated can help reduce your risk for chlamydia, gonorrhea, herpes, and HIV (human immunodeficiency virus). Treatment is also important for preventing complications in pregnant women, because the condition can cause an early (premature) delivery.  · This condition is treated with antibiotic medicines. These may be given as a pill, a vaginal cream, or a medicine that is put into the vagina (suppository).  This information is not intended to replace advice given to you by your health care provider. Make sure you discuss any questions you have with your health care provider.  Document Released: 12/18/2006 Document Revised: 11/30/2018 Document Reviewed: 09/02/2017  [employer] Patient Education © 2020 [employer]      Acute Knee Pain, Adult  Acute knee pain is sudden and may be caused by damage, swelling, or irritation of the muscles and tissues that support your knee. The injury may result from:  · A fall.  · An injury to your knee from twisting motions.  · A hit to the  knee.  · Infection.  Acute knee pain may go away on its own with time and rest. If it does not, your health care provider may order tests to find the cause of the pain. These may include:  · Imaging tests, such as an X-ray, MRI, or ultrasound.  · Joint aspiration. In this test, fluid is removed from the knee.  · Arthroscopy. In this test, a lighted tube is inserted into the knee and an image is projected onto a TV screen.  · Biopsy. In this test, a sample of tissue is removed from the body and studied under a microscope.  Follow these instructions at home:  Pay attention to any changes in your symptoms. Take these actions to relieve your pain.  If you have a knee sleeve or brace:    · Wear the sleeve or brace as told by your health care provider. Remove it only as told by your health care provider.  · Loosen the sleeve or brace if your toes tingle, become numb, or turn cold and blue.  · Keep the sleeve or brace clean.  · If the sleeve or brace is not waterproof:  ? Do not let it get wet.  ? Cover it with a watertight covering when you take a bath or shower.  Activity  · Rest your knee.  · Do not do things that cause pain or make pain worse.  · Avoid high-impact activities or exercises, such as running, jumping rope, or doing jumping jacks.  · Work with a physical therapist to make a safe exercise program, as recommended by your health care provider. Do exercises as told by your physical therapist.  Managing pain, stiffness, and swelling    · If directed, put ice on the knee:  ? Put ice in a plastic bag.  ? Place a towel between your skin and the bag.  ? Leave the ice on for 20 minutes, 2-3 times a day.  · If directed, use an elastic bandage to put pressure (compression) on your injured knee. This may control swelling, give support, and help with discomfort.  General instructions  · Take over-the-counter and prescription medicines only as told by your health care provider.  · Raise (elevate) your knee above the level  of your heart when you are sitting or lying down.  · Sleep with a pillow under your knee.  · Do not use any products that contain nicotine or tobacco, such as cigarettes, e-cigarettes, and chewing tobacco. These can delay healing. If you need help quitting, ask your health care provider.  · If you are overweight, work with your health care provider and a dietitian to set a weight-loss goal that is healthy and reasonable for you. Extra weight can put pressure on your knee.  · Keep all follow-up visits as told by your health care provider. This is important.  Contact a health care provider if:  · Your knee pain continues, changes, or gets worse.  · You have a fever along with knee pain.  · Your knee feels warm to the touch.  · Your knee waldemar or locks up.  Get help right away if:  · Your knee swells, and the swelling becomes worse.  · You cannot move your knee.  · You have severe pain in your knee.  Summary  · Acute knee pain can be caused by a fall, an injury, an infection, or damage, swelling, or irritation of the tissues that support your knee.  · Your health care provider may perform tests to find out the cause of the pain.  · Pay attention to any changes in your symptoms. Relieve your pain with rest, medicines, light activity, and use of ice.  · Get help if your pain continues or becomes worse, your knee swells, or you cannot move your knee.  This information is not intended to replace advice given to you by your health care provider. Make sure you discuss any questions you have with your health care provider.  Document Released: 10/14/2008 Document Revised: 05/30/2019 Document Reviewed: 05/30/2019  [employer] Patient Education © 2020 [employer]      Atopic Dermatitis  Atopic dermatitis is a skin disorder that causes inflammation of the skin. This is the most common type of eczema. Eczema is a group of skin conditions that cause the skin to be itchy, red, and swollen. This condition is generally worse during  the cooler winter months and often improves during the warm summer months. Symptoms can vary from person to person.  Atopic dermatitis usually starts showing signs in infancy and can last through adulthood. This condition cannot be passed from one person to another (non-contagious), but it is more common in families. Atopic dermatitis may not always be present. When it is present, it is called a flare-up.  What are the causes?  The exact cause of this condition is not known. Flare-ups of the condition may be triggered by:  · Contact with something that you are sensitive or allergic to.  · Stress.  · Certain foods.  · Extremely hot or cold weather.  · Harsh chemicals and soaps.  · Dry air.  · Chlorine.  What increases the risk?  This condition is more likely to develop in people who have a personal history or family history of eczema, allergies, asthma, or hay fever.  What are the signs or symptoms?  Symptoms of this condition include:  · Dry, scaly skin.  · Red, itchy rash.  · Itchiness, which can be severe. This may occur before the skin rash. This can make sleeping difficult.  · Skin thickening and cracking that can occur over time.  How is this diagnosed?  This condition is diagnosed based on your symptoms, a medical history, and a physical exam.  How is this treated?  There is no cure for this condition, but symptoms can usually be controlled. Treatment focuses on:  · Controlling the itchiness and scratching. You may be given medicines, such as antihistamines or steroid creams.  · Limiting exposure to things that you are sensitive or allergic to (allergens).  · Recognizing situations that cause stress and developing a plan to manage stress.  If your atopic dermatitis does not get better with medicines, or if it is all over your body (widespread), a treatment using a specific type of light (phototherapy) may be used.  Follow these instructions at home:  Skin care    · Keep your skin well-moisturized. Doing this  seals in moisture and helps to prevent dryness.  ? Use unscented lotions that have petroleum in them.  ? Avoid lotions that contain alcohol or water. They can dry the skin.  · Keep baths or showers short (less than 5 minutes) in warm water. Do not use hot water.  ? Use mild, unscented cleansers for bathing. Avoid soap and bubble bath.  ? Apply a moisturizer to your skin right after a bath or shower.  · Do not apply anything to your skin without checking with your health care provider.  General instructions  · Dress in clothes made of cotton or cotton blends. Dress lightly because heat increases itchiness.  · When washing your clothes, rinse your clothes twice so all of the soap is removed.  · Avoid any triggers that can cause a flare-up.  · Try to manage your stress.  · Keep your fingernails cut short.  · Avoid scratching. Scratching makes the rash and itchiness worse. It may also result in a skin infection (impetigo) due to a break in the skin caused by scratching.  · Take or apply over-the-counter and prescription medicines only as told by your health care provider.  · Keep all follow-up visits as told by your health care provider. This is important.  · Do not be around people who have cold sores or fever blisters. If you get the infection, it may cause your atopic dermatitis to worsen.  Contact a health care provider if:  · Your itchiness interferes with sleep.  · Your rash gets worse or it is not better within one week of starting treatment.  · You have a fever.  · You have a rash flare-up after having contact with someone who has cold sores or fever blisters.  Get help right away if:  · You develop pus or soft yellow scabs in the rash area.  Summary  · This condition causes a red rash and itchy, dry, scaly skin.  · Treatment focuses on controlling the itchiness and scratching, limiting exposure to things that you are sensitive or allergic to (allergens), recognizing situations that cause stress, and developing  a plan to manage stress.  · Keep your skin well-moisturized.  · Keep baths or showers shorter than 5 minutes and use warm water. Do not use hot water.  This information is not intended to replace advice given to you by your health care provider. Make sure you discuss any questions you have with your health care provider.  Document Released: 12/15/2001 Document Revised: 04/07/2020 Document Reviewed: 01/19/2018  [employer] Patient Education © 2020 [employer]

## 2021-05-21 NOTE — PROGRESS NOTES
"  Subjective:     Florencia Lau is a 41 y.o. female who presents for Nail Problem (fingers ( both ) reoccuring, painful. ) and Knee Pain (left, pain, numbness and swelling )      Presents for nail fungus. States symptoms started the geginning of April. Has yellow discoloration under nails. Dryness is causing hang nails and splitting of nails. Took a course of lamisil and topical cream. States symptoms are returning, fingertips are painful. States she hasn't been using mouisterizer, as she thought it would contribute to more fungus.     Also c/o left knee pain. Pain to anterior knee. Denies injury. Aching, swelling, and intermittent sharp pains. Swelling behind knee, goes in to leg causing her whole leg to swell. Intermittent numbness and tingling. Her chronic level of pain is 6/10. Knee pain 7/10. No hx of DVT. Had redness to patellar area, now resolved. No calf redness, or heat. States she has a history of joint pain, suspected arthritis, testing in progress. Will have flairs with joint pain.     Also c/o dysuria. Recently tested positive for BV. States they did not check her urine. Concerned for possible UTI.  Is currently not taking oral flagyl as prescribed, stating she does not do well with it. Mid back pain bilateral.         Nail Problem  Associated symptoms include myalgias. Pertinent negatives include no fever, nausea, vomiting or weakness.   Knee Pain  Associated symptoms include myalgias. Pertinent negatives include no fever, nausea, vomiting or weakness.       Past Medical History:   Diagnosis Date   • Anxiety    • Anxiety and depression 06/12/2018   • Back pain    • Bowel habit changes 06/12/2018    \"Constipation/Diarrhea\"   • Bronchitis     HX OF   • Depression    • Dysfunctional uterine bleeding    • Fibromyalgia    • Hayfever 06/12/2018   • Indigestion 06/12/2018   • Insomnia    • Muscle spasm 06/12/2018    \"My neurologist told me I have a muscle spasm disorder\"   • Neck pain    • Panic " attacks    • Shoulder pain    • TMJ syndrome        Past Surgical History:   Procedure Laterality Date   • HARDWARE REMOVAL ORTHO Left 6/18/2019    Procedure: HARDWARE REMOVAL ORTHO - FIBULA;  Surgeon: Jose Ruiz M.D.;  Location: SURGERY [facility];  Service: Orthopedics   • ANKLE ARTHROSCOPY Right 12/18/2018    Procedure: ANKLE ARTHROSCOPY;  Surgeon: Jose Ruiz M.D.;  Location: SURGERY [facility];  Service: Orthopedics   • LIGAMENT REPAIR Right 12/18/2018    Procedure: LIGAMENT REPAIR- ANTERIOR TALOFIBULAR/ CALCANEOFIBULAR, POSTERIOR TIBIALIS REPAIR;  Surgeon: Jose Ruiz M.D.;  Location: SURGERY [facility];  Service: Orthopedics   • IRRIGATION & DEBRIDEMENT ORTHO Right 12/18/2018    Procedure: IRRIGATION & DEBRIDEMENT ORTHO-POSTERIOR TIBIALIS DEBRIDEMENT;  Surgeon: Jose Ruiz M.D.;  Location: SURGERY [facility];  Service: Orthopedics   • ANKLE ORIF Left 9/2/2018    Procedure: ANKLE ORIF;  Surgeon: Dayday Jaramillo M.D.;  Location: SURGERY [facility];  Service: Orthopedics   • BLADDER SUSPENSION  6/27/2018    Procedure: BLADDER SUSPENSION-   FOR: TRANS OBTURATOR TAPE;  Surgeon: Chapin Banks M.D.;  Location: SURGERY SAME DAY [facility];  Service: Gynecology   • OTHER      neck abscess   • TONSILLECTOMY     • TUBAL LIGATION         Social History     Socioeconomic History   • Marital status: Single     Spouse name: Not on file   • Number of children: Not on file   • Years of education: Not on file   • Highest education level: Not on file   Occupational History   • Not on file   Tobacco Use   • Smoking status: Current Every Day Smoker     Packs/day: 0.50     Years: 18.00     Pack years: 9.00     Types: Cigarettes   • Smokeless tobacco: Never Used   • Tobacco comment: 1 PK per day   Vaping Use   • Vaping Use: Never used   Substance and Sexual Activity   • Alcohol use: No   • Drug use: No   • Sexual activity: Not on file   Other Topics Concern   • Not on file  "  Social History Narrative   • Not on file     Social Determinants of Health     Financial Resource Strain:    • Difficulty of Paying Living Expenses:    Food Insecurity:    • Worried About Running Out of Food in the Last Year:    • Ran Out of Food in the Last Year:    Transportation Needs:    • Lack of Transportation (Medical):    • Lack of Transportation (Non-Medical):    Physical Activity:    • Days of Exercise per Week:    • Minutes of Exercise per Session:    Stress:    • Feeling of Stress :    Social Connections:    • Frequency of Communication with Friends and Family:    • Frequency of Social Gatherings with Friends and Family:    • Attends [employer] Services:    • Active Member of Clubs or Organizations:    • Attends Club or Organization Meetings:    • Marital Status:    Intimate Partner Violence:    • Fear of Current or Ex-Partner:    • Emotionally Abused:    • Physically Abused:    • Sexually Abused:         Family History   Problem Relation Age of Onset   • Diabetes Other    • Allergies Other         RA   • Other Other         DIVERTICULITIS, LUPUS, DDD, FM        Allergies   Allergen Reactions   • Latex Rash   • Sulfa Drugs Unspecified     \"Muscle spasm\".       • Codeine Unspecified     \"Childhood allergie, not sure what happens\".     • Morphine Itching       Review of Systems   Constitutional: Negative for fever.   Gastrointestinal: Negative for nausea and vomiting.   Genitourinary: Positive for dysuria. Negative for hematuria.   Musculoskeletal: Positive for back pain, joint pain and myalgias.   Neurological: Positive for tingling. Negative for weakness.   Psychiatric/Behavioral: Negative for suicidal ideas. The patient is nervous/anxious.    All other systems reviewed and are negative.       Objective:   /66 (BP Location: Right arm, Patient Position: Sitting, BP Cuff Size: Adult)   Pulse 100   Temp 36.3 °C (97.4 °F) (Temporal)   Resp 16   Ht 1.702 m (5' 7\")   Wt 64.2 kg (141 lb 9.6 oz)   " SpO2 97%   BMI 22.18 kg/m²     Physical Exam  Vitals reviewed.   Constitutional:       General: She is not in acute distress.     Appearance: She is well-developed.   HENT:      Head: Normocephalic and atraumatic.      Right Ear: External ear normal.      Left Ear: External ear normal.      Nose: Nose normal.   Eyes:      Conjunctiva/sclera: Conjunctivae normal.   Cardiovascular:      Rate and Rhythm: Normal rate.   Pulmonary:      Effort: Pulmonary effort is normal.   Abdominal:      Tenderness: There is no abdominal tenderness. There is no right CVA tenderness or left CVA tenderness.   Musculoskeletal:         General: Tenderness present. No swelling, deformity or signs of injury. Normal range of motion.      Right hand: Tenderness present. Normal range of motion. Normal strength. Normal capillary refill.      Left hand: Tenderness present. Normal range of motion. Normal strength. Normal capillary refill.      Cervical back: Normal range of motion.      Left knee: No swelling, effusion, erythema, ecchymosis or crepitus. Normal range of motion. Tenderness present. No medial joint line or lateral joint line tenderness. Normal alignment and normal patellar mobility.      Left lower leg: No swelling. No edema.      Comments: Tenderness to palpation of patella, and posterior knee. Normal visual appearance of let knee and lower leg. No redness, heat, swelling. No calf swelling, TTP, redness, warmth. Distal neurovascular intact.          Skin:     General: Skin is warm and dry.      Findings: No bruising, erythema or rash. Rash is not crusting, pustular or vesicular.      Comments: No nail discoloration. Generalized dried skin to distal tips of fingers and cuticles, some fissures. No redness, edema, or drainage. TTP.    Neurological:      General: No focal deficit present.      Mental Status: She is alert and oriented to person, place, and time.      GCS: GCS eye subscore is 4. GCS verbal subscore is 5. GCS motor  subscore is 6.   Psychiatric:         Mood and Affect: Affect is not angry.         Speech: Speech normal.         Behavior: Behavior is hyperactive. Behavior is not agitated or aggressive.         Thought Content: Thought content normal.         Judgment: Judgment normal.         Assessment/Plan:   1. Acute pain of left knee  - DX-KNEE 3 VIEWS LEFT; IMPRESSION: No fracture or dislocation of LEFT knee.  -NSAID's (ibuprofen) and tylenol as directed for pain and inflammation.   -RICE Therapy: Rest, Ice, Compression, Elevation.  -Compression with an elastic bandage for swelling.    2. Dermatitis  - triamcinolone acetonide (KENALOG) 0.1 % Cream; Apply 1 Application topically 2 times a day for 14 days.  Dispense: 15 g; Refill: 0  -Moisturizer.    3. Bacterial vaginosis  - metroNIDAZOLE (METROGEL-VAGINAL) 0.75 % Gel; Insert 1 Applicator into the vagina at bedtime for 5 days.  Dispense: 5 Each; Refill: 0    4. Dysuria  - POCT Urinalysis  -Oral hydration  -Follow up urgently for abdominal pain, flank pain, difficulty with urination, fevers, vomiting, weakness, tachycardia, or any other concerns    5. Leukocytes in urine  - URINE CULTURE(NEW); Future    Results for orders placed or performed in visit on 05/21/21   POCT Urinalysis   Result Value Ref Range    POC Color Yellow Negative    POC Appearance Clear Negative    POC Leukocyte Esterase Small Negative    POC Nitrites Negative Negative    POC Urobiligen 0.2 Negative (0.2) mg/dL    POC Protein Negative Negative mg/dL    POC Urine PH 5.5 5.0 - 8.0    POC Blood Small Negative    POC Specific Gravity 1.025 <1.005 - >1.030    POC Ketones Negative Negative mg/dL    POC Bilirubin Negative Negative mg/dL    POC Glucose Negative Negative mg/dL     Follow up with PCP. Follow up for worsening symptoms, signs of infection, fever, pain, or any other concerns. Follow up emergently for severe uncontrolled pain, neurovascular compromise (decreased sensation, motion, or circulation).      Discussed the following:  -Importance of follow up with PCP for chronic or persistent symptoms. -Imaging of knee per report of persistent swelling, r/o arthritis.   -Finger symptoms not consistent with Onychomycosis, no nail discoloration. Symptoms to soft tissue, more consistent with dermatitis. -Treatment changed for BV, will follow up on urine culture. Discussed leukocytes can be from vaginal discharge.     Differential diagnosis, natural history, supportive care, and indications for immediate follow-up discussed.

## 2021-05-22 ASSESSMENT — ENCOUNTER SYMPTOMS
NERVOUS/ANXIOUS: 1
MYALGIAS: 1
VOMITING: 0
TINGLING: 1
WEAKNESS: 0
NAUSEA: 0
BACK PAIN: 1
FEVER: 0

## 2021-05-24 LAB
BACTERIA UR CULT: NORMAL
SIGNIFICANT IND 70042: NORMAL
SITE SITE: NORMAL
SOURCE SOURCE: NORMAL

## 2021-06-08 ENCOUNTER — OFFICE VISIT (OUTPATIENT)
Dept: URGENT CARE | Facility: CLINIC | Age: 41
End: 2021-06-08
Payer: COMMERCIAL

## 2021-06-08 VITALS
HEART RATE: 108 BPM | OXYGEN SATURATION: 97 % | TEMPERATURE: 97.9 F | RESPIRATION RATE: 17 BRPM | DIASTOLIC BLOOD PRESSURE: 72 MMHG | HEIGHT: 67 IN | SYSTOLIC BLOOD PRESSURE: 112 MMHG | BODY MASS INDEX: 21.5 KG/M2 | WEIGHT: 137 LBS

## 2021-06-08 DIAGNOSIS — M19.90 INFLAMMATORY ARTHRITIS: ICD-10-CM

## 2021-06-08 PROCEDURE — 99213 OFFICE O/P EST LOW 20 MIN: CPT | Performed by: PHYSICIAN ASSISTANT

## 2021-06-08 ASSESSMENT — FIBROSIS 4 INDEX: FIB4 SCORE: 0.64

## 2021-06-09 NOTE — PROGRESS NOTES
Subjective:   Florencia Lau is a 41 y.o. female who presents for Arthritis (athritis flare up , knee swelling , needs referral or guidance on what to do or where to go )      HPI  41 y.o. female with history of fibromyalgia, chronic pain syndrome, and inflammatory arthritis presents to urgent care with new problem to provider of diffuse and intermittent joint pain which has been ongoing and now worse over the few weeks. Patient is concerned she may have RA or other autoimmune disorder causing worsening of her chronic joint pain. She denies new injuries. No fevers or redness. Mild joint swelling. Denies other associated aggravating or alleviating factors.     Review of Systems   Constitutional: Negative for chills, fever and malaise/fatigue.   Gastrointestinal: Negative for nausea and vomiting.   Musculoskeletal: Positive for joint pain and myalgias. Negative for back pain and neck pain.   Neurological: Negative for tingling, sensory change and focal weakness.   Endo/Heme/Allergies: Does not bruise/bleed easily.       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   • Nondisplaced fracture of medial malleolus of left tibia   • Left ankle pain   • Anxiety   • Chronic cough   • Constipation, chronic   • Dyslipidemia   • Peripheral neuropathy   • Restless leg syndrome     Past Surgical History:   Procedure Laterality Date   • HARDWARE REMOVAL ORTHO Left 6/18/2019    Procedure: HARDWARE REMOVAL ORTHO - FIBULA;  Surgeon: Jose Ruiz M.D.;  Location: SURGERY [facility];  Service: Orthopedics   • ANKLE ARTHROSCOPY Right 12/18/2018    Procedure: ANKLE ARTHROSCOPY;  Surgeon:  "Jose Ruiz M.D.;  Location: SURGERY [facility];  Service: Orthopedics   • LIGAMENT REPAIR Right 12/18/2018    Procedure: LIGAMENT REPAIR- ANTERIOR TALOFIBULAR/ CALCANEOFIBULAR, POSTERIOR TIBIALIS REPAIR;  Surgeon: Jose Ruiz M.D.;  Location: SURGERY [facility];  Service: Orthopedics   • IRRIGATION & DEBRIDEMENT ORTHO Right 12/18/2018    Procedure: IRRIGATION & DEBRIDEMENT ORTHO-POSTERIOR TIBIALIS DEBRIDEMENT;  Surgeon: Jose Ruiz M.D.;  Location: SURGERY [facility];  Service: Orthopedics   • ANKLE ORIF Left 9/2/2018    Procedure: ANKLE ORIF;  Surgeon: Dayday Jaramillo M.D.;  Location: SURGERY [facility];  Service: Orthopedics   • BLADDER SUSPENSION  6/27/2018    Procedure: BLADDER SUSPENSION-   FOR: TRANS OBTURATOR TAPE;  Surgeon: Chapin Banks M.D.;  Location: SURGERY SAME DAY [facility];  Service: Gynecology   • OTHER      neck abscess   • TONSILLECTOMY     • TUBAL LIGATION       Social History     Tobacco Use   • Smoking status: Current Every Day Smoker     Packs/day: 0.50     Years: 18.00     Pack years: 9.00     Types: Cigarettes   • Smokeless tobacco: Never Used   • Tobacco comment: 1 PK per day   Vaping Use   • Vaping Use: Never used   Substance Use Topics   • Alcohol use: No   • Drug use: No      Family History   Problem Relation Age of Onset   • Diabetes Other    • Allergies Other         RA   • Other Other         DIVERTICULITIS, LUPUS, DDD, FM      (Allergies, Medications, & Tobacco/Substance Use were reconciled by the Medical Assistant and reviewed by myself. The family history is prepopulated)     Objective:     /72 (BP Location: Left arm, Patient Position: Sitting, BP Cuff Size: Adult)   Pulse (!) 108   Temp 36.6 °C (97.9 °F) (Temporal)   Resp 17   Ht 1.702 m (5' 7\")   Wt 62.1 kg (137 lb)   SpO2 97%   BMI 21.46 kg/m²     Physical Exam  Vitals reviewed.   Constitutional:       General: She is not in acute distress.     Appearance: Normal " appearance. She is well-developed.   HENT:      Head: Normocephalic and atraumatic.   Eyes:      Conjunctiva/sclera: Conjunctivae normal.   Cardiovascular:      Rate and Rhythm: Normal rate and regular rhythm.      Heart sounds: Normal heart sounds.   Pulmonary:      Effort: Pulmonary effort is normal. No respiratory distress.      Breath sounds: Normal breath sounds.   Musculoskeletal:      Cervical back: Normal range of motion and neck supple.   Skin:     General: Skin is warm and dry.      Findings: No erythema.      Comments: Diffuse tenderness over major joints. No joint effusions, erythema, bruising, or deformities. Joint spaces without nodules present. Normal gait, able to get on and off exam table without difficulty.    Neurological:      General: No focal deficit present.      Mental Status: She is alert and oriented to person, place, and time.   Psychiatric:         Mood and Affect: Mood normal.         Behavior: Behavior normal.         Thought Content: Thought content normal.         Judgment: Judgment normal.         Assessment/Plan:     1. Inflammatory arthritis  REFERRAL TO FOLLOW-UP WITH PRIMARY CARE    KELLI REFLEXIVE PROFILE    Sed Rate    CRP QUANTITIVE (NON-CARDIAC)    RHEUMATOID ARTHRITIS FACTOR    CCP     Labs ordered for further evaluation of migratory diffuse joint pain. She has had negative work up for RA in the past. No acute findings on physical exam consistent with injuries. No indications for imaging at this time. Continue with OTC pain medications. Discussed with patient need for further evaluation in primary care setting.     Differential diagnosis, natural history, supportive care, and indications for immediate follow-up discussed.    Advised the patient to follow-up with the primary care physician for recheck, reevaluation, and consideration of further management.  Patient verbalized understanding of treatment plan and has no further questions regarding care.     I personally reviewed  prior external notes and test results pertinent to today's visit.     Please note that this dictation was created using voice recognition software. I have made a reasonable attempt to correct obvious errors, but I expect that there are errors of grammar and possibly content that I did not discover before finalizing the note.    This note was electronically signed by Shanita Huitron PA-C

## 2021-06-14 ENCOUNTER — HOSPITAL ENCOUNTER (OUTPATIENT)
Dept: RADIOLOGY | Facility: MEDICAL CENTER | Age: 41
End: 2021-06-14
Attending: NURSE PRACTITIONER
Payer: COMMERCIAL

## 2021-06-14 ENCOUNTER — HOSPITAL ENCOUNTER (OUTPATIENT)
Dept: LAB | Facility: MEDICAL CENTER | Age: 41
End: 2021-06-14
Attending: PHYSICIAN ASSISTANT
Payer: COMMERCIAL

## 2021-06-14 DIAGNOSIS — M25.512 LEFT SHOULDER PAIN, UNSPECIFIED CHRONICITY: ICD-10-CM

## 2021-06-14 DIAGNOSIS — M25.511 CHRONIC RIGHT SHOULDER PAIN: ICD-10-CM

## 2021-06-14 DIAGNOSIS — M19.90 INFLAMMATORY ARTHRITIS: ICD-10-CM

## 2021-06-14 DIAGNOSIS — G89.29 CHRONIC RIGHT SHOULDER PAIN: ICD-10-CM

## 2021-06-14 LAB
CRP SERPL HS-MCNC: 0.39 MG/DL (ref 0–0.75)
ERYTHROCYTE [SEDIMENTATION RATE] IN BLOOD BY WESTERGREN METHOD: 7 MM/HOUR (ref 0–25)
RHEUMATOID FACT SER IA-ACNC: <10 IU/ML (ref 0–14)

## 2021-06-14 PROCEDURE — 73030 X-RAY EXAM OF SHOULDER: CPT | Mod: LT

## 2021-06-14 PROCEDURE — 86038 ANTINUCLEAR ANTIBODIES: CPT

## 2021-06-14 PROCEDURE — 73030 X-RAY EXAM OF SHOULDER: CPT | Mod: RT

## 2021-06-14 PROCEDURE — 86200 CCP ANTIBODY: CPT

## 2021-06-14 PROCEDURE — 36415 COLL VENOUS BLD VENIPUNCTURE: CPT

## 2021-06-14 PROCEDURE — 85652 RBC SED RATE AUTOMATED: CPT

## 2021-06-14 PROCEDURE — 86431 RHEUMATOID FACTOR QUANT: CPT

## 2021-06-14 PROCEDURE — 86140 C-REACTIVE PROTEIN: CPT

## 2021-06-17 LAB
CCP IGG SERPL-ACNC: 8 UNITS (ref 0–19)
NUCLEAR IGG SER QL IA: NORMAL

## 2021-06-17 RX ORDER — TRAZODONE HYDROCHLORIDE 100 MG/1
TABLET ORAL
COMMUNITY
Start: 2021-06-08 | End: 2022-02-03

## 2021-06-17 ASSESSMENT — ENCOUNTER SYMPTOMS
NECK PAIN: 0
NAUSEA: 0
FEVER: 0
BACK PAIN: 0
BRUISES/BLEEDS EASILY: 0
VOMITING: 0
TINGLING: 0
FOCAL WEAKNESS: 0
MYALGIAS: 1
CHILLS: 0
SENSORY CHANGE: 0

## 2021-07-05 ENCOUNTER — OFFICE VISIT (OUTPATIENT)
Dept: URGENT CARE | Facility: CLINIC | Age: 41
End: 2021-07-05
Payer: COMMERCIAL

## 2021-07-05 ENCOUNTER — HOSPITAL ENCOUNTER (OUTPATIENT)
Facility: MEDICAL CENTER | Age: 41
End: 2021-07-05
Attending: PHYSICIAN ASSISTANT
Payer: COMMERCIAL

## 2021-07-05 VITALS
OXYGEN SATURATION: 98 % | BODY MASS INDEX: 21.47 KG/M2 | RESPIRATION RATE: 20 BRPM | DIASTOLIC BLOOD PRESSURE: 70 MMHG | SYSTOLIC BLOOD PRESSURE: 120 MMHG | TEMPERATURE: 97.5 F | WEIGHT: 136.8 LBS | HEART RATE: 93 BPM | HEIGHT: 67 IN

## 2021-07-05 DIAGNOSIS — N89.8 VAGINAL DISCHARGE: ICD-10-CM
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
## 2021-07-05 DIAGNOSIS — T19.2XXA RETAINED TAMPON, INITIAL ENCOUNTER: ICD-10-CM

## 2021-07-05 DIAGNOSIS — R21 SKIN RASH: ICD-10-CM

## 2021-07-05 PROCEDURE — 87480 CANDIDA DNA DIR PROBE: CPT

## 2021-07-05 PROCEDURE — 99215 OFFICE O/P EST HI 40 MIN: CPT | Performed by: PHYSICIAN ASSISTANT

## 2021-07-05 PROCEDURE — 87510 GARDNER VAG DNA DIR PROBE: CPT

## 2021-07-05 PROCEDURE — 87660 TRICHOMONAS VAGIN DIR PROBE: CPT

## 2021-07-05 RX ORDER — KETOCONAZOLE 20 MG/G
CREAM TOPICAL
Qty: 30 G | Refills: 1 | Status: SHIPPED | OUTPATIENT
Start: 2021-07-05 | End: 2022-04-01

## 2021-07-05 RX ORDER — CLINDAMYCIN HYDROCHLORIDE 300 MG/1
300 CAPSULE ORAL 3 TIMES DAILY
Qty: 21 CAPSULE | Refills: 0 | Status: SHIPPED | OUTPATIENT
Start: 2021-07-05 | End: 2021-07-12

## 2021-07-05 ASSESSMENT — ENCOUNTER SYMPTOMS
SHORTNESS OF BREATH: 0
NAUSEA: 0
CHILLS: 0
VOMITING: 0
COUGH: 0
FEVER: 0
DIARRHEA: 0
ABDOMINAL PAIN: 1

## 2021-07-05 ASSESSMENT — FIBROSIS 4 INDEX: FIB4 SCORE: 0.64

## 2021-07-05 NOTE — PROGRESS NOTES
Subjective:   Florencia Lau is a 41 y.o. female who presents for Rash (pt. suspects it may be ring worm, presented itself 3 month, has gotten worse the last 2 weeks, finger nails hurt really bad.) and Vaginitis (had a tampon stuck for a while and discovered it a week ago.  green discharge)      HPI  41 y.o. female presents to urgent care with new problem to provider of retained tampon for over 2 weeks. She removed the tampon last week but is not certain that is was entirely removed. Its onset of worsening yellow/greenish vaginal discharge and pain. She denies vomiting or fevers. No vaginal bleeding.     Patient also c/o persistent diffuse rash which has been ongoing over the few months.  Been seen in urgent care setting for similar complaints and was given referral. She has follow up with Derm in 2 weeks.     Review of Systems   Constitutional: Negative for chills, fever and malaise/fatigue.   Respiratory: Negative for cough and shortness of breath.    Cardiovascular: Negative for chest pain.   Gastrointestinal: Positive for abdominal pain. Negative for diarrhea, nausea and vomiting.   Genitourinary: Negative for dysuria, frequency and urgency.        Vaginal discharge   Skin: Positive for itching and rash.       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   • Nondisplaced fracture of medial malleolus of left tibia   • Left ankle pain   • Anxiety   • Chronic cough   • Constipation, chronic   • Dyslipidemia   • Peripheral neuropathy   • Restless leg syndrome     Past Surgical History:   Procedure Laterality Date   • HARDWARE REMOVAL ORTHO Left 6/18/2019    Procedure: HARDWARE  REMOVAL ORTHO - FIBULA;  Surgeon: Jose Ruiz M.D.;  Location: SURGERY [facility];  Service: Orthopedics   • ANKLE ARTHROSCOPY Right 12/18/2018    Procedure: ANKLE ARTHROSCOPY;  Surgeon: Jose Ruiz M.D.;  Location: SURGERY [facility];  Service: Orthopedics   • LIGAMENT REPAIR Right 12/18/2018    Procedure: LIGAMENT REPAIR- ANTERIOR TALOFIBULAR/ CALCANEOFIBULAR, POSTERIOR TIBIALIS REPAIR;  Surgeon: Jose Ruiz M.D.;  Location: SURGERY [facility];  Service: Orthopedics   • IRRIGATION & DEBRIDEMENT ORTHO Right 12/18/2018    Procedure: IRRIGATION & DEBRIDEMENT ORTHO-POSTERIOR TIBIALIS DEBRIDEMENT;  Surgeon: Jose Ruiz M.D.;  Location: SURGERY [facility];  Service: Orthopedics   • ANKLE ORIF Left 9/2/2018    Procedure: ANKLE ORIF;  Surgeon: Dayday Jaramillo M.D.;  Location: SURGERY [facility];  Service: Orthopedics   • BLADDER SUSPENSION  6/27/2018    Procedure: BLADDER SUSPENSION-   FOR: TRANS OBTURATOR TAPE;  Surgeon: Chapin Banks M.D.;  Location: SURGERY SAME DAY [facility];  Service: Gynecology   • OTHER      neck abscess   • TONSILLECTOMY     • TUBAL LIGATION       Social History     Tobacco Use   • Smoking status: Current Every Day Smoker     Packs/day: 0.50     Years: 18.00     Pack years: 9.00     Types: Cigarettes   • Smokeless tobacco: Never Used   • Tobacco comment: 1 PK per day   Vaping Use   • Vaping Use: Never used   Substance Use Topics   • Alcohol use: No   • Drug use: No      Family History   Problem Relation Age of Onset   • Diabetes Other    • Allergies Other         RA   • Other Other         DIVERTICULITIS, LUPUS, DDD, FM      (Allergies, Medications, & Tobacco/Substance Use were reconciled by the Medical Assistant and reviewed by myself. The family history is prepopulated)     Objective:     /70 (BP Location: Left arm, Patient Position: Sitting, BP Cuff Size: Adult)   Pulse 93   Temp 36.4 °C (97.5 °F) (Temporal)   Resp 20   Ht  "1.702 m (5' 7\")   Wt 62.1 kg (136 lb 12.8 oz)   SpO2 98%   BMI 21.43 kg/m²     Physical Exam  Vitals reviewed.   Constitutional:       General: She is not in acute distress.     Appearance: Normal appearance. She is well-developed. She is not ill-appearing or toxic-appearing.   HENT:      Head: Normocephalic and atraumatic.   Eyes:      Conjunctiva/sclera: Conjunctivae normal.   Cardiovascular:      Rate and Rhythm: Normal rate and regular rhythm.      Heart sounds: Normal heart sounds.   Pulmonary:      Effort: Pulmonary effort is normal. No respiratory distress.      Breath sounds: Normal breath sounds.   Abdominal:      Palpations: Abdomen is soft.      Comments: Suprapubic abdominal tenderness   Genitourinary:     Exam position: Supine.      Vagina: Foreign body present. Vaginal discharge and erythema present.      Cervix: Discharge present. No cervical bleeding.   Musculoskeletal:      Cervical back: Normal range of motion and neck supple.   Skin:     General: Skin is warm and dry.      Findings: No erythema.      Comments: Diffuse faint macular rash    Neurological:      General: No focal deficit present.      Mental Status: She is alert and oriented to person, place, and time.   Psychiatric:         Mood and Affect: Mood normal.         Behavior: Behavior normal.         Thought Content: Thought content normal.         Judgment: Judgment normal.       Procedure: Vaginal Foreign Body Removal   -Risks, benefits and alternatives discussed.  -Clean technique with sterile instruments  -Foreign body removed with forceps  -Patient tolerated well    Assessment/Plan:     1. Retained tampon, initial encounter  clindamycin (CLEOCIN) 300 MG Cap   2. Vaginal discharge  VAGINAL PATHOGENS DNA PANEL    clindamycin (CLEOCIN) 300 MG Cap   3. Skin rash  ketoconazole (NIZORAL) 2 % Cream     Patient presents with vaginal retained foreign body has been there for at least 2 weeks.  It was partially removed and she is now having " increasing vaginal discharge and mild pain.  She has no concerning symptoms of fevers or nausea vomiting consistent with toxic shock syndrome, patient is empirically treated this time.  Follow-up pending DNA vaginal itching results.    Recommend follow-up with dermatology as scheduled for further evaluation of persistent rash.  Differential diagnosis, natural history, supportive care, and indications for immediate follow-up discussed.    Advised the patient to follow-up with the primary care physician for recheck, reevaluation, and consideration of further management.  Patient verbalized understanding of treatment plan and has no further questions regarding care.     I personally reviewed prior external notes and test results pertinent to today's visit.   My total time spent caring for the patient on the day of the encounter was greater than 40 minutes.   This does not include time spent on separately billable procedures/tests.    Please note that this dictation was created using voice recognition software. I have made a reasonable attempt to correct obvious errors, but I expect that there are errors of grammar and possibly content that I did not discover before finalizing the note.    This note was electronically signed by Shanita Huitron PA-C

## 2021-07-08 ENCOUNTER — APPOINTMENT (RX ONLY)
Dept: URBAN - METROPOLITAN AREA CLINIC 36 | Facility: CLINIC | Age: 41
Setting detail: DERMATOLOGY
End: 2021-07-08

## 2021-07-08 DIAGNOSIS — L82.1 OTHER SEBORRHEIC KERATOSIS: ICD-10-CM

## 2021-07-08 DIAGNOSIS — L259 CONTACT DERMATITIS AND OTHER ECZEMA, UNSPECIFIED CAUSE: ICD-10-CM

## 2021-07-08 DIAGNOSIS — L98.8 OTHER SPECIFIED DISORDERS OF THE SKIN AND SUBCUTANEOUS TISSUE: ICD-10-CM

## 2021-07-08 DIAGNOSIS — L57.8 OTHER SKIN CHANGES DUE TO CHRONIC EXPOSURE TO NONIONIZING RADIATION: ICD-10-CM

## 2021-07-08 DIAGNOSIS — L60.3 NAIL DYSTROPHY: ICD-10-CM

## 2021-07-08 PROBLEM — L23.9 ALLERGIC CONTACT DERMATITIS, UNSPECIFIED CAUSE: Status: ACTIVE | Noted: 2021-07-08

## 2021-07-08 PROCEDURE — 99203 OFFICE O/P NEW LOW 30 MIN: CPT

## 2021-07-08 PROCEDURE — ? SUNSCREEN RECOMMENDATIONS

## 2021-07-08 PROCEDURE — ? PRESCRIPTION

## 2021-07-08 PROCEDURE — ? ADDITIONAL NOTES

## 2021-07-08 PROCEDURE — ? COUNSELING

## 2021-07-08 RX ORDER — CLOBETASOL PROPIONATE 0.5 MG/ML
1 SOLUTION TOPICAL QD
Qty: 25 | Refills: 1 | Status: ERX | COMMUNITY
Start: 2021-07-08

## 2021-07-08 RX ADMIN — CLOBETASOL PROPIONATE 1: 0.5 SOLUTION TOPICAL at 00:00

## 2021-07-08 ASSESSMENT — LOCATION SIMPLE DESCRIPTION DERM
LOCATION SIMPLE: LEFT INDEX FINGERNAIL
LOCATION SIMPLE: RIGHT RING FINGERNAIL
LOCATION SIMPLE: RIGHT MIDDLE FINGERNAIL
LOCATION SIMPLE: RIGHT INDEX FINGER
LOCATION SIMPLE: LEFT THUMBNAIL
LOCATION SIMPLE: LEFT RING FINGERNAIL
LOCATION SIMPLE: LEFT MIDDLE FINGERNAIL
LOCATION SIMPLE: LEFT CLAVICULAR SKIN
LOCATION SIMPLE: LEFT SHOULDER
LOCATION SIMPLE: LEFT THUMB
LOCATION SIMPLE: RIGHT MIDDLE FINGER
LOCATION SIMPLE: RIGHT THUMBNAIL
LOCATION SIMPLE: RIGHT INDEX FINGERNAIL
LOCATION SIMPLE: RIGHT FOREARM
LOCATION SIMPLE: LEFT INDEX FINGER
LOCATION SIMPLE: RIGHT UPPER BACK
LOCATION SIMPLE: RIGHT RING FINGER
LOCATION SIMPLE: RIGHT SMALL FINGERNAIL
LOCATION SIMPLE: LEFT SMALL FINGERNAIL
LOCATION SIMPLE: LEFT FOREARM
LOCATION SIMPLE: RIGHT THUMB
LOCATION SIMPLE: LEFT SMALL FINGER

## 2021-07-08 ASSESSMENT — LOCATION DETAILED DESCRIPTION DERM
LOCATION DETAILED: LEFT MID DORSAL INDEX FINGER
LOCATION DETAILED: LEFT SMALL FINGERNAIL
LOCATION DETAILED: RIGHT SMALL FINGERNAIL
LOCATION DETAILED: RIGHT MIDDLE FINGER DISTAL INTERPHALANGEAL JOINT
LOCATION DETAILED: LEFT INDEX FINGER LUNULA
LOCATION DETAILED: LEFT INDEX FINGERNAIL
LOCATION DETAILED: RIGHT MIDDLE FINGERNAIL
LOCATION DETAILED: LEFT RING FINGER LUNULA
LOCATION DETAILED: LEFT RING FINGERNAIL
LOCATION DETAILED: LEFT CLAVICULAR SKIN
LOCATION DETAILED: LEFT THUMBNAIL
LOCATION DETAILED: PERIUNGUAL SKIN RIGHT INDEX FINGER
LOCATION DETAILED: LEFT VENTRAL PROXIMAL FOREARM
LOCATION DETAILED: PERIUNGUAL SKIN RIGHT THUMB
LOCATION DETAILED: LEFT MIDDLE FINGERNAIL
LOCATION DETAILED: RIGHT SUPERIOR MEDIAL UPPER BACK
LOCATION DETAILED: RIGHT THUMBNAIL
LOCATION DETAILED: PERIUNGUAL SKIN LEFT THUMB
LOCATION DETAILED: RIGHT RING FINGER LUNULA
LOCATION DETAILED: RIGHT MID DORSAL RING FINGER
LOCATION DETAILED: RIGHT VENTRAL PROXIMAL FOREARM
LOCATION DETAILED: PERIUNGUAL SKIN LEFT SMALL FINGER
LOCATION DETAILED: LEFT ANTERIOR SHOULDER
LOCATION DETAILED: RIGHT INDEX FINGERNAIL

## 2021-07-08 ASSESSMENT — LOCATION ZONE DERM
LOCATION ZONE: FINGERNAIL
LOCATION ZONE: FINGER
LOCATION ZONE: ARM
LOCATION ZONE: TRUNK

## 2021-07-08 NOTE — PROCEDURE: ADDITIONAL NOTES
Detail Level: Simple
Additional Notes: Patient was instructed to soak hands in 50/50 apple cider vinegar and water as well as Aveeno oatmeal soaks, then apply Aquaphor to moisturize and to apply Clobetasol solution until resolved. Patient is to call office if no improvement in 6 months.
Render Risk Assessment In Note?: no

## 2021-07-08 NOTE — HPI: NAIL DYSTROPHY
How Severe Is It?: moderate
Is This A New Presentation, Or A Follow-Up?: Nail Dystrophy
Additional History: The patient states her fingernail problems started after using nail glue/adhesive in April 2021.  Initially treated by her primary with topical clotrimazole with no improvement.  Believes she was also treated with ketoconazole topical cream.  The patient has been diagnosed with fibromyalgia and experiences joint pain and is concerned she may have psoriasis due to her nail changes.  States she is cutting her nails to help with her symptoms.  notes she has painful fissures of her distal fingertips.

## 2021-08-11 ENCOUNTER — APPOINTMENT (RX ONLY)
Dept: URBAN - METROPOLITAN AREA CLINIC 4 | Facility: CLINIC | Age: 41
Setting detail: DERMATOLOGY
End: 2021-08-11

## 2021-08-11 DIAGNOSIS — Z71.89 OTHER SPECIFIED COUNSELING: ICD-10-CM

## 2021-08-11 DIAGNOSIS — B35.3 TINEA PEDIS: ICD-10-CM | Status: WORSENING

## 2021-08-11 DIAGNOSIS — L65.9 NONSCARRING HAIR LOSS, UNSPECIFIED: ICD-10-CM

## 2021-08-11 DIAGNOSIS — L40.0 PSORIASIS VULGARIS: ICD-10-CM | Status: INADEQUATELY CONTROLLED

## 2021-08-11 PROBLEM — L30.9 DERMATITIS, UNSPECIFIED: Status: ACTIVE | Noted: 2021-08-11

## 2021-08-11 PROCEDURE — ? DIAGNOSIS COMMENT

## 2021-08-11 PROCEDURE — ? PRESCRIPTION

## 2021-08-11 PROCEDURE — ? NAIL CLIPPING FOR PAS

## 2021-08-11 PROCEDURE — ? COUNSELING

## 2021-08-11 PROCEDURE — ? SEPARATE AND IDENTIFIABLE DOCUMENTATION

## 2021-08-11 PROCEDURE — ? ORDER TESTS

## 2021-08-11 PROCEDURE — 99214 OFFICE O/P EST MOD 30 MIN: CPT

## 2021-08-11 RX ORDER — FLUOCINOLONE ACETONIDE 0.1 MG/ML
1 SOLUTION TOPICAL
Qty: 1 | Refills: 0 | Status: ERX | COMMUNITY
Start: 2021-08-11

## 2021-08-11 RX ORDER — KETOCONAZOLE 20 MG/G
1 CREAM TOPICAL BID
Qty: 1 | Refills: 2 | Status: ERX | COMMUNITY
Start: 2021-08-11

## 2021-08-11 RX ADMIN — FLUOCINOLONE ACETONIDE 1: 0.1 SOLUTION TOPICAL at 00:00

## 2021-08-11 RX ADMIN — KETOCONAZOLE THIN LAYER: 20 CREAM TOPICAL at 00:00

## 2021-08-11 ASSESSMENT — LOCATION DETAILED DESCRIPTION DERM
LOCATION DETAILED: LEFT MIDDLE FINGERNAIL
LOCATION DETAILED: RIGHT PLANTAR FOREFOOT OVERLYING 3RD METATARSAL
LOCATION DETAILED: LEFT DORSAL FOOT
LOCATION DETAILED: LEFT PLANTAR FOREFOOT OVERLYING 3RD METATARSAL
LOCATION DETAILED: RIGHT MIDDLE FINGERNAIL
LOCATION DETAILED: RIGHT DORSAL FOOT
LOCATION DETAILED: MID-FRONTAL SCALP
LOCATION DETAILED: RIGHT MID-UPPER BACK

## 2021-08-11 ASSESSMENT — LOCATION ZONE DERM
LOCATION ZONE: FINGERNAIL
LOCATION ZONE: SCALP
LOCATION ZONE: FEET
LOCATION ZONE: TRUNK

## 2021-08-11 ASSESSMENT — LOCATION SIMPLE DESCRIPTION DERM
LOCATION SIMPLE: RIGHT MIDDLE FINGERNAIL
LOCATION SIMPLE: RIGHT FOOT
LOCATION SIMPLE: RIGHT UPPER BACK
LOCATION SIMPLE: ANTERIOR SCALP
LOCATION SIMPLE: LEFT FOOT
LOCATION SIMPLE: LEFT MIDDLE FINGERNAIL
LOCATION SIMPLE: RIGHT PLANTAR SURFACE
LOCATION SIMPLE: LEFT PLANTAR SURFACE

## 2021-08-11 NOTE — PROCEDURE: ORDER TESTS
Billing Type: Third-Party Bill
Expected Date Of Service: 08/11/2021
Performing Laboratory: 0
Bill For Surgical Tray: no

## 2021-08-11 NOTE — PROCEDURE: DIAGNOSIS COMMENT
Detail Level: Simple
Comment: Pt has nail dystrophy. Counseled in seeing rheumatologist. Does not present today with psoriasis plaques or symptoms.
Render Risk Assessment In Note?: no

## 2021-08-11 NOTE — PROCEDURE: NAIL CLIPPING FOR PAS
Detail Level: Detailed
Billing Type: Third-Party Bill
Add [id number] To Bill?: No
Lab: [id number]
Lab Facility:

## 2021-08-11 NOTE — HPI: NAIL DYSTROPHY
How Severe Is It?: moderate
Is This A New Presentation, Or A Follow-Up?: Nail Dystrophy
Additional History: Has morning stiffness, gets better throughout the day

## 2021-09-02 ENCOUNTER — APPOINTMENT (RX ONLY)
Dept: URBAN - METROPOLITAN AREA CLINIC 4 | Facility: CLINIC | Age: 41
Setting detail: DERMATOLOGY
End: 2021-09-02

## 2021-09-02 DIAGNOSIS — L29.89 OTHER PRURITUS: ICD-10-CM

## 2021-09-02 DIAGNOSIS — L30.8 OTHER SPECIFIED DERMATITIS: ICD-10-CM

## 2021-09-02 DIAGNOSIS — L29.8 OTHER PRURITUS: ICD-10-CM

## 2021-09-02 DIAGNOSIS — L30.9 DERMATITIS, UNSPECIFIED: ICD-10-CM

## 2021-09-02 DIAGNOSIS — L65.9 NONSCARRING HAIR LOSS, UNSPECIFIED: ICD-10-CM

## 2021-09-02 PROCEDURE — ? PRESCRIPTION

## 2021-09-02 PROCEDURE — 99213 OFFICE O/P EST LOW 20 MIN: CPT

## 2021-09-02 PROCEDURE — ? COUNSELING

## 2021-09-02 PROCEDURE — ? ORDER TESTS

## 2021-09-02 RX ORDER — HYDROCORTISONE 25 MG/G
CREAM TOPICAL
Qty: 60 | Refills: 2 | Status: ERX | COMMUNITY
Start: 2021-09-02

## 2021-09-02 RX ORDER — HYDROXYZINE HYDROCHLORIDE 10 MG/1
TABLET, FILM COATED ORAL QHS
Qty: 90 | Refills: 0 | Status: ERX | COMMUNITY
Start: 2021-09-02

## 2021-09-02 RX ORDER — FLUOCINONIDE 0.5 MG/G
OINTMENT TOPICAL
Qty: 60 | Refills: 0 | Status: ERX | COMMUNITY
Start: 2021-09-02

## 2021-09-02 RX ADMIN — HYDROCORTISONE THIN LAYER: 25 CREAM TOPICAL at 00:00

## 2021-09-02 RX ADMIN — FLUOCINONIDE THIN LAYER: 0.5 OINTMENT TOPICAL at 00:00

## 2021-09-02 RX ADMIN — HYDROXYZINE HYDROCHLORIDE 1: 10 TABLET, FILM COATED ORAL at 00:00

## 2021-09-02 ASSESSMENT — LOCATION DETAILED DESCRIPTION DERM
LOCATION DETAILED: RIGHT INDEX FINGERTIP
LOCATION DETAILED: RIGHT RADIAL DORSAL HAND
LOCATION DETAILED: LEFT CENTRAL FRONTAL SCALP
LOCATION DETAILED: LEFT ULNAR DORSAL HAND
LOCATION DETAILED: LEFT DISTAL PALMAR INDEX FINGER

## 2021-09-02 ASSESSMENT — LOCATION ZONE DERM
LOCATION ZONE: HAND
LOCATION ZONE: SCALP
LOCATION ZONE: FINGER

## 2021-09-02 ASSESSMENT — LOCATION SIMPLE DESCRIPTION DERM
LOCATION SIMPLE: RIGHT HAND
LOCATION SIMPLE: LEFT SCALP
LOCATION SIMPLE: LEFT INDEX FINGER
LOCATION SIMPLE: RIGHT INDEX FINGER
LOCATION SIMPLE: LEFT HAND

## 2021-09-02 NOTE — HPI: ITCHING
What Type Of Note Output Would You Prefer (Optional)?: Standard Output
How Severe Is Your Itching?: severe

## 2021-09-02 NOTE — PROCEDURE: COUNSELING
Patient Specific Counseling (Will Not Stick From Patient To Patient): White cotton gloves
Detail Level: Detailed

## 2021-09-02 NOTE — PROCEDURE: ORDER TESTS
Expected Date Of Service: 09/02/2021
Performing Laboratory: 0
Billing Type: Third-Party Bill
Bill For Surgical Tray: no

## 2021-11-06 ENCOUNTER — HOSPITAL ENCOUNTER (EMERGENCY)
Facility: MEDICAL CENTER | Age: 41
End: 2021-11-06
Attending: EMERGENCY MEDICINE
Payer: COMMERCIAL

## 2021-11-06 VITALS
TEMPERATURE: 98 F | OXYGEN SATURATION: 98 % | HEART RATE: 80 BPM | WEIGHT: 127.87 LBS | SYSTOLIC BLOOD PRESSURE: 118 MMHG | BODY MASS INDEX: 20.07 KG/M2 | RESPIRATION RATE: 16 BRPM | DIASTOLIC BLOOD PRESSURE: 64 MMHG | HEIGHT: 67 IN
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
## 2021-11-06 DIAGNOSIS — T19.2XXA RETAINED TAMPON, INITIAL ENCOUNTER: ICD-10-CM

## 2021-11-06 LAB
ALBUMIN SERPL BCP-MCNC: 4.3 G/DL (ref 3.2–4.9)
ALBUMIN/GLOB SERPL: 1.6 G/DL
ALP SERPL-CCNC: 82 U/L (ref 30–99)
ALT SERPL-CCNC: 12 U/L (ref 2–50)
ANION GAP SERPL CALC-SCNC: 15 MMOL/L (ref 7–16)
AST SERPL-CCNC: 18 U/L (ref 12–45)
BACTERIA GENITAL QL WET PREP: NORMAL
BASOPHILS # BLD AUTO: 0.9 % (ref 0–1.8)
BASOPHILS # BLD: 0.05 K/UL (ref 0–0.12)
BILIRUB SERPL-MCNC: 0.5 MG/DL (ref 0.1–1.5)
BUN SERPL-MCNC: 10 MG/DL (ref 8–22)
CALCIUM SERPL-MCNC: 9.2 MG/DL (ref 8.4–10.2)
CHLORIDE SERPL-SCNC: 103 MMOL/L (ref 96–112)
CO2 SERPL-SCNC: 21 MMOL/L (ref 20–33)
CREAT SERPL-MCNC: 0.67 MG/DL (ref 0.5–1.4)
EOSINOPHIL # BLD AUTO: 0.12 K/UL (ref 0–0.51)
EOSINOPHIL NFR BLD: 2 % (ref 0–6.9)
ERYTHROCYTE [DISTWIDTH] IN BLOOD BY AUTOMATED COUNT: 42.1 FL (ref 35.9–50)
GLOBULIN SER CALC-MCNC: 2.7 G/DL (ref 1.9–3.5)
GLUCOSE SERPL-MCNC: 91 MG/DL (ref 65–99)
HCT VFR BLD AUTO: 47.6 % (ref 37–47)
HGB BLD-MCNC: 15.5 G/DL (ref 12–16)
IMM GRANULOCYTES # BLD AUTO: 0.01 K/UL (ref 0–0.11)
IMM GRANULOCYTES NFR BLD AUTO: 0.2 % (ref 0–0.9)
LYMPHOCYTES # BLD AUTO: 1.95 K/UL (ref 1–4.8)
LYMPHOCYTES NFR BLD: 33.2 % (ref 22–41)
MCH RBC QN AUTO: 30.6 PG (ref 27–33)
MCHC RBC AUTO-ENTMCNC: 32.6 G/DL (ref 33.6–35)
MCV RBC AUTO: 94.1 FL (ref 81.4–97.8)
MONOCYTES # BLD AUTO: 0.26 K/UL (ref 0–0.85)
MONOCYTES NFR BLD AUTO: 4.4 % (ref 0–13.4)
NEUTROPHILS # BLD AUTO: 3.49 K/UL (ref 2–7.15)
NEUTROPHILS NFR BLD: 59.3 % (ref 44–72)
NRBC # BLD AUTO: 0 K/UL
NRBC BLD-RTO: 0 /100 WBC
PLATELET # BLD AUTO: 282 K/UL (ref 164–446)
PMV BLD AUTO: 10.5 FL (ref 9–12.9)
POTASSIUM SERPL-SCNC: 3.7 MMOL/L (ref 3.6–5.5)
PROCALCITONIN SERPL-MCNC: 0.03 NG/ML
PROT SERPL-MCNC: 7 G/DL (ref 6–8.2)
RBC # BLD AUTO: 5.06 M/UL (ref 4.2–5.4)
SIGNIFICANT IND 70042: NORMAL
SITE SITE: NORMAL
SODIUM SERPL-SCNC: 139 MMOL/L (ref 135–145)
SOURCE SOURCE: NORMAL
WBC # BLD AUTO: 5.9 K/UL (ref 4.8–10.8)

## 2021-11-06 PROCEDURE — 84145 PROCALCITONIN (PCT): CPT

## 2021-11-06 PROCEDURE — 85025 COMPLETE CBC W/AUTO DIFF WBC: CPT

## 2021-11-06 PROCEDURE — 96372 THER/PROPH/DIAG INJ SC/IM: CPT

## 2021-11-06 PROCEDURE — 36415 COLL VENOUS BLD VENIPUNCTURE: CPT

## 2021-11-06 PROCEDURE — 80053 COMPREHEN METABOLIC PANEL: CPT

## 2021-11-06 PROCEDURE — 87491 CHLMYD TRACH DNA AMP PROBE: CPT

## 2021-11-06 PROCEDURE — 99284 EMERGENCY DEPT VISIT MOD MDM: CPT

## 2021-11-06 PROCEDURE — 87040 BLOOD CULTURE FOR BACTERIA: CPT

## 2021-11-06 PROCEDURE — 87591 N.GONORRHOEAE DNA AMP PROB: CPT

## 2021-11-06 PROCEDURE — 700111 HCHG RX REV CODE 636 W/ 250 OVERRIDE (IP): Performed by: EMERGENCY MEDICINE

## 2021-11-06 RX ORDER — AMOXICILLIN AND CLAVULANATE POTASSIUM 875; 125 MG/1; MG/1
1 TABLET, FILM COATED ORAL 2 TIMES DAILY
Qty: 10 TABLET | Refills: 0 | Status: SHIPPED | OUTPATIENT
Start: 2021-11-06 | End: 2021-11-11

## 2021-11-06 RX ORDER — FLUCONAZOLE 100 MG/1
100 TABLET ORAL DAILY
Qty: 2 TABLET | Refills: 0 | Status: SHIPPED | OUTPATIENT
Start: 2021-11-06 | End: 2022-04-01

## 2021-11-06 RX ORDER — KETOROLAC TROMETHAMINE 30 MG/ML
15 INJECTION, SOLUTION INTRAMUSCULAR; INTRAVENOUS ONCE
Status: COMPLETED | OUTPATIENT
Start: 2021-11-06 | End: 2021-11-06

## 2021-11-06 RX ADMIN — KETOROLAC TROMETHAMINE 15 MG: 30 INJECTION, SOLUTION INTRAMUSCULAR at 17:36

## 2021-11-06 ASSESSMENT — FIBROSIS 4 INDEX: FIB4 SCORE: 0.64

## 2021-11-06 ASSESSMENT — PAIN DESCRIPTION - PAIN TYPE: TYPE: ACUTE PAIN

## 2021-11-06 NOTE — ED TRIAGE NOTES
"C/O vaginal bleeding with purulent discharge recurring for the past few days.  She describes removing 2 tampons that had been indwelling for weeks.  Chief Complaint   Patient presents with   • Vaginal Bleeding   • Tampon Removal     /86   Pulse 90   Temp 36.3 °C (97.3 °F) (Temporal)   Resp 18   Ht 1.702 m (5' 7\")   Wt 58 kg (127 lb 13.9 oz)   SpO2 99%   BMI 20.03 kg/m²   Has this patient been vaccinated for COVID NO  If not, would they like to be vaccinated while in the ER if eligible?  NO  Would the patient like to speak with the ERP about the possibility of receiving the COVID vaccine today before making a decision? NO    "

## 2021-11-07 NOTE — ED NOTES
Blood drawn with the 1st set of blood cultures sent to lab    Dr Hill evaluated patient and new orders obtained.

## 2021-11-07 NOTE — DISCHARGE INSTRUCTIONS
Your test results for gonorrhea and chlamydia will result tomorrow.  You will get a call if these are positive.  If you develop a fever chills or significant pelvic pain please return to the emergency department.

## 2021-11-07 NOTE — ED PROVIDER NOTES
ED Provider Note    CHIEF COMPLAINT  Chief Complaint   Patient presents with   • Vaginal Bleeding   • Tampon Removal       HPI  Florencia Lau is a 41 y.o. female who presents with complaint of vaginal bleeding.  Patient reports longstanding history of dysfunctional uterine bleeding which is typically worse after sex.  Patient reports that she has had some ongoing bleeding for the last month.  She uses tampons regularly.  Patient noticed some foul-smelling discharge and some increased bleeding more than normal and therefore earlier today checked her vagina and realized that she had a very old tampon.  She reports it smelled very foul; this obviously concerned patient she came to the emergency department for further evaluation.  Patient denies any fevers.  Patient denies any nausea or vomiting.  Patient reports she does have a new sexual partner and she is also concerned about possible STDs.  Patient denies any considerable abdominal pain.  She does have a history of fibromyalgia reports ongoing chronic pain.    REVIEW OF SYSTEMS  ROS    See HPI for further details. All other systems are negative.     PAST MEDICAL HISTORY   has a past medical history of Anxiety, Anxiety and depression (06/12/2018), Back pain, Bowel habit changes (06/12/2018), Bronchitis, Depression, Dysfunctional uterine bleeding, Fibromyalgia, Hayfever (06/12/2018), Indigestion (06/12/2018), Insomnia, Muscle spasm (06/12/2018), Neck pain, Panic attacks, Shoulder pain, and TMJ syndrome.    SOCIAL HISTORY  Social History     Tobacco Use   • Smoking status: Current Every Day Smoker     Packs/day: 0.50     Years: 18.00     Pack years: 9.00     Types: Cigarettes   • Smokeless tobacco: Never Used   • Tobacco comment: 1 PK per day   Vaping Use   • Vaping Use: Never used   Substance and Sexual Activity   • Alcohol use: No   • Drug use: No   • Sexual activity: Not on file       SURGICAL HISTORY   has a past surgical history that includes  "tonsillectomy; tubal ligation; other; bladder suspension (6/27/2018); ankle orif (Left, 9/2/2018); ankle arthroscopy (Right, 12/18/2018); ligament repair (Right, 12/18/2018); irrigation & debridement ortho (Right, 12/18/2018); and hardware removal ortho (Left, 6/18/2019).    CURRENT MEDICATIONS  Home Medications     Reviewed by Salvatore Christensen R.N. (Registered Nurse) on 11/06/21 at 1514  Med List Status: Not Addressed   Medication Last Dose Status   gabapentin (NEURONTIN) 600 MG tablet  Active   ketoconazole (NIZORAL) 2 % Cream  Active   lamoTRIgine (LAMICTAL) 100 MG Tab  Active   traZODone (DESYREL) 100 MG Tab  Active   venlafaxine (EFFEXOR XR) 150 MG extended-release capsule  Active                ALLERGIES  Allergies   Allergen Reactions   • Latex Rash   • Sulfa Drugs Unspecified     \"Muscle spasm\".       • Codeine Unspecified     \"Childhood allergie, not sure what happens\".     • Morphine Itching       PHYSICAL EXAM  Vitals:    11/06/21 1508   BP: 140/86   Pulse: 90   Resp: 18   Temp: 36.3 °C (97.3 °F)   SpO2: 99%       Physical Exam  Constitutional:       Appearance: She is well-developed.   HENT:      Head: Normocephalic and atraumatic.   Eyes:      Conjunctiva/sclera: Conjunctivae normal.   Cardiovascular:      Rate and Rhythm: Normal rate and regular rhythm.   Pulmonary:      Effort: Pulmonary effort is normal.      Breath sounds: Normal breath sounds.   Abdominal:      General: Bowel sounds are normal. There is no distension.      Palpations: Abdomen is soft.      Tenderness: There is no abdominal tenderness. There is no rebound.   Genitourinary:     Comments: There is no ongoing foreign body.  Cervix is unremarkable.  No CMT.  No adnexal masses or tenderness.  No major discharge.  Musculoskeletal:      Cervical back: Normal range of motion and neck supple.   Skin:     General: Skin is warm and dry.      Findings: No rash.   Neurological:      Mental Status: She is alert and oriented to person, place, and " time.   Psychiatric:         Behavior: Behavior normal.           DIAGNOSTIC STUDIES / PROCEDURES      LABS  Results for orders placed or performed during the hospital encounter of 11/06/21   CBC WITH DIFFERENTIAL   Result Value Ref Range    WBC 5.9 4.8 - 10.8 K/uL    RBC 5.06 4.20 - 5.40 M/uL    Hemoglobin 15.5 12.0 - 16.0 g/dL    Hematocrit 47.6 (H) 37.0 - 47.0 %    MCV 94.1 81.4 - 97.8 fL    MCH 30.6 27.0 - 33.0 pg    MCHC 32.6 (L) 33.6 - 35.0 g/dL    RDW 42.1 35.9 - 50.0 fL    Platelet Count 282 164 - 446 K/uL    MPV 10.5 9.0 - 12.9 fL    Neutrophils-Polys 59.30 44.00 - 72.00 %    Lymphocytes 33.20 22.00 - 41.00 %    Monocytes 4.40 0.00 - 13.40 %    Eosinophils 2.00 0.00 - 6.90 %    Basophils 0.90 0.00 - 1.80 %    Immature Granulocytes 0.20 0.00 - 0.90 %    Nucleated RBC 0.00 /100 WBC    Neutrophils (Absolute) 3.49 2.00 - 7.15 K/uL    Lymphs (Absolute) 1.95 1.00 - 4.80 K/uL    Monos (Absolute) 0.26 0.00 - 0.85 K/uL    Eos (Absolute) 0.12 0.00 - 0.51 K/uL    Baso (Absolute) 0.05 0.00 - 0.12 K/uL    Immature Granulocytes (abs) 0.01 0.00 - 0.11 K/uL    NRBC (Absolute) 0.00 K/uL   CMP   Result Value Ref Range    Sodium 139 135 - 145 mmol/L    Potassium 3.7 3.6 - 5.5 mmol/L    Chloride 103 96 - 112 mmol/L    Co2 21 20 - 33 mmol/L    Anion Gap 15.0 7.0 - 16.0    Glucose 91 65 - 99 mg/dL    Bun 10 8 - 22 mg/dL    Creatinine 0.67 0.50 - 1.40 mg/dL    Calcium 9.2 8.4 - 10.2 mg/dL    AST(SGOT) 18 12 - 45 U/L    ALT(SGPT) 12 2 - 50 U/L    Alkaline Phosphatase 82 30 - 99 U/L    Total Bilirubin 0.5 0.1 - 1.5 mg/dL    Albumin 4.3 3.2 - 4.9 g/dL    Total Protein 7.0 6.0 - 8.2 g/dL    Globulin 2.7 1.9 - 3.5 g/dL    A-G Ratio 1.6 g/dL   PROCALCITONIN   Result Value Ref Range    Procalcitonin 0.03 <0.25 ng/mL   Chlamydia/GC PCR Urine Or Swab    Specimen: Genital   Result Value Ref Range    Source Vaginal    WET PREP    Specimen: Genital   Result Value Ref Range    Significant Indicator NEG     Source GEN     Site GENITAL     Wet  Prep For Parasites       Moderate WBCs seen.  No yeast.  No motile Trichomonas seen.  No clue cells seen.     ESTIMATED GFR   Result Value Ref Range    GFR If African American >60 >60 mL/min/1.73 m 2    GFR If Non African American >60 >60 mL/min/1.73 m 2       COURSE & MEDICAL DECISION MAKING  Pertinent Labs & Imaging studies reviewed. (See chart for details)    Very well-appearing patient here after removing a vaginal foreign body.  Patient exam fails to reveal any evidence of ongoing foreign body or ongoing infection at this point.  Patient cervix is entirely normal.  Patient appears very well but given the amount of time before body could have been inpatient unnoticed, anywhere from 2 weeks to a month I do believe that empiric treatment with Augmentin is indicated.  We will also check basic labs and send blood cultures.  I have sent  chlamydia outpatient.  I have offered empiric treatment but given patient's very reassuring exam she has decided to defer.  Patient is requesting Diflucan to be prescribed as well as she often gets yeast infections following courses of antibiotics.  Patient's labs are very reassuring.    The patient will return for worsening symptoms and is stable at the time of discharge. The patient verbalizes understanding and will comply.    FINAL IMPRESSION    1. Retained tampon, initial encounter               Electronically signed by: Gio Hill M.D., 11/6/2021 5:21 PM

## 2021-11-11 LAB
BACTERIA BLD CULT: NORMAL
BACTERIA BLD CULT: NORMAL
C TRACH DNA SPEC QL NAA+PROBE: NEGATIVE
N GONORRHOEA DNA SPEC QL NAA+PROBE: NEGATIVE
SIGNIFICANT IND 70042: NORMAL
SIGNIFICANT IND 70042: NORMAL
SITE SITE: NORMAL
SITE SITE: NORMAL
SOURCE SOURCE: NORMAL
SOURCE SOURCE: NORMAL
SPECIMEN SOURCE: NORMAL

## 2022-01-25 ENCOUNTER — HOSPITAL ENCOUNTER (EMERGENCY)
Facility: MEDICAL CENTER | Age: 42
End: 2022-01-26
Attending: EMERGENCY MEDICINE
Payer: COMMERCIAL

## 2022-01-25 DIAGNOSIS — R11.2 NAUSEA AND VOMITING, INTRACTABILITY OF VOMITING NOT SPECIFIED, UNSPECIFIED VOMITING TYPE: ICD-10-CM

## 2022-01-25 DIAGNOSIS — R10.13 EPIGASTRIC ABDOMINAL PAIN: ICD-10-CM

## 2022-01-25 DIAGNOSIS — F10.10 ALCOHOL ABUSE: ICD-10-CM

## 2022-01-25 DIAGNOSIS — F10.920 ACUTE ALCOHOLIC INTOXICATION WITHOUT COMPLICATION (HCC): ICD-10-CM

## 2022-01-25 PROCEDURE — 99284 EMERGENCY DEPT VISIT MOD MDM: CPT

## 2022-01-26 ENCOUNTER — APPOINTMENT (OUTPATIENT)
Dept: RADIOLOGY | Facility: MEDICAL CENTER | Age: 42
End: 2022-01-26
Attending: EMERGENCY MEDICINE

## 2022-01-26 VITALS
RESPIRATION RATE: 18 BRPM | HEART RATE: 79 BPM | TEMPERATURE: 97.4 F | OXYGEN SATURATION: 97 % | SYSTOLIC BLOOD PRESSURE: 87 MMHG | DIASTOLIC BLOOD PRESSURE: 54 MMHG

## 2022-01-26 LAB
ALBUMIN SERPL BCP-MCNC: 4 G/DL (ref 3.2–4.9)
ALBUMIN/GLOB SERPL: 1.6 G/DL
ALP SERPL-CCNC: 67 U/L (ref 30–99)
ALT SERPL-CCNC: 16 U/L (ref 2–50)
ANION GAP SERPL CALC-SCNC: 15 MMOL/L (ref 7–16)
AST SERPL-CCNC: 25 U/L (ref 12–45)
BASOPHILS # BLD AUTO: 0.4 % (ref 0–1.8)
BASOPHILS # BLD: 0.06 K/UL (ref 0–0.12)
BILIRUB SERPL-MCNC: 0.2 MG/DL (ref 0.1–1.5)
BUN SERPL-MCNC: 8 MG/DL (ref 8–22)
CALCIUM SERPL-MCNC: 8.1 MG/DL (ref 8.4–10.2)
CHLORIDE SERPL-SCNC: 105 MMOL/L (ref 96–112)
CO2 SERPL-SCNC: 21 MMOL/L (ref 20–33)
CREAT SERPL-MCNC: 0.58 MG/DL (ref 0.5–1.4)
EOSINOPHIL # BLD AUTO: 0.07 K/UL (ref 0–0.51)
EOSINOPHIL NFR BLD: 0.5 % (ref 0–6.9)
ERYTHROCYTE [DISTWIDTH] IN BLOOD BY AUTOMATED COUNT: 46.4 FL (ref 35.9–50)
ETHANOL BLD-MCNC: 92.9 MG/DL (ref 0–10)
GLOBULIN SER CALC-MCNC: 2.5 G/DL (ref 1.9–3.5)
GLUCOSE SERPL-MCNC: 99 MG/DL (ref 65–99)
HCG SERPL QL: NEGATIVE
HCT VFR BLD AUTO: 47.8 % (ref 37–47)
HGB BLD-MCNC: 15.5 G/DL (ref 12–16)
IMM GRANULOCYTES # BLD AUTO: 0.08 K/UL (ref 0–0.11)
IMM GRANULOCYTES NFR BLD AUTO: 0.6 % (ref 0–0.9)
LIPASE SERPL-CCNC: 53 U/L (ref 7–58)
LYMPHOCYTES # BLD AUTO: 1.29 K/UL (ref 1–4.8)
LYMPHOCYTES NFR BLD: 9.6 % (ref 22–41)
MCH RBC QN AUTO: 31.3 PG (ref 27–33)
MCHC RBC AUTO-ENTMCNC: 32.4 G/DL (ref 33.6–35)
MCV RBC AUTO: 96.4 FL (ref 81.4–97.8)
MONOCYTES # BLD AUTO: 0.45 K/UL (ref 0–0.85)
MONOCYTES NFR BLD AUTO: 3.4 % (ref 0–13.4)
NEUTROPHILS # BLD AUTO: 11.46 K/UL (ref 2–7.15)
NEUTROPHILS NFR BLD: 85.5 % (ref 44–72)
NRBC # BLD AUTO: 0 K/UL
NRBC BLD-RTO: 0 /100 WBC
PLATELET # BLD AUTO: 261 K/UL (ref 164–446)
PMV BLD AUTO: 10.8 FL (ref 9–12.9)
POTASSIUM SERPL-SCNC: 3.6 MMOL/L (ref 3.6–5.5)
PROT SERPL-MCNC: 6.5 G/DL (ref 6–8.2)
RBC # BLD AUTO: 4.96 M/UL (ref 4.2–5.4)
SODIUM SERPL-SCNC: 141 MMOL/L (ref 135–145)
WBC # BLD AUTO: 13.4 K/UL (ref 4.8–10.8)

## 2022-01-26 PROCEDURE — 700111 HCHG RX REV CODE 636 W/ 250 OVERRIDE (IP): Performed by: EMERGENCY MEDICINE

## 2022-01-26 PROCEDURE — 36415 COLL VENOUS BLD VENIPUNCTURE: CPT

## 2022-01-26 PROCEDURE — 700105 HCHG RX REV CODE 258: Performed by: EMERGENCY MEDICINE

## 2022-01-26 PROCEDURE — 80053 COMPREHEN METABOLIC PANEL: CPT

## 2022-01-26 PROCEDURE — 82077 ASSAY SPEC XCP UR&BREATH IA: CPT

## 2022-01-26 PROCEDURE — 74022 RADEX COMPL AQT ABD SERIES: CPT

## 2022-01-26 PROCEDURE — 84703 CHORIONIC GONADOTROPIN ASSAY: CPT

## 2022-01-26 PROCEDURE — 96375 TX/PRO/DX INJ NEW DRUG ADDON: CPT

## 2022-01-26 PROCEDURE — 83690 ASSAY OF LIPASE: CPT

## 2022-01-26 PROCEDURE — 85025 COMPLETE CBC W/AUTO DIFF WBC: CPT

## 2022-01-26 PROCEDURE — 96374 THER/PROPH/DIAG INJ IV PUSH: CPT

## 2022-01-26 RX ORDER — ONDANSETRON 4 MG/1
4 TABLET, ORALLY DISINTEGRATING ORAL EVERY 6 HOURS PRN
Qty: 10 TABLET | Refills: 0 | Status: SHIPPED | OUTPATIENT
Start: 2022-01-26 | End: 2022-02-03

## 2022-01-26 RX ORDER — OMEPRAZOLE 40 MG/1
40 CAPSULE, DELAYED RELEASE ORAL DAILY
Qty: 14 CAPSULE | Refills: 0 | Status: SHIPPED | OUTPATIENT
Start: 2022-01-26 | End: 2022-02-03

## 2022-01-26 RX ORDER — SODIUM CHLORIDE 9 MG/ML
1000 INJECTION, SOLUTION INTRAVENOUS ONCE
Status: COMPLETED | OUTPATIENT
Start: 2022-01-26 | End: 2022-01-26

## 2022-01-26 RX ORDER — ONDANSETRON 2 MG/ML
4 INJECTION INTRAMUSCULAR; INTRAVENOUS ONCE
Status: COMPLETED | OUTPATIENT
Start: 2022-01-26 | End: 2022-01-26

## 2022-01-26 RX ADMIN — SODIUM CHLORIDE 1000 ML: 9 INJECTION, SOLUTION INTRAVENOUS at 01:31

## 2022-01-26 RX ADMIN — FAMOTIDINE 40 MG: 10 INJECTION INTRAVENOUS at 01:28

## 2022-01-26 RX ADMIN — ONDANSETRON 4 MG: 2 INJECTION INTRAMUSCULAR; INTRAVENOUS at 01:28

## 2022-01-26 NOTE — DISCHARGE INSTRUCTIONS
You need to cut back on your alcohol use or it will cause long-term problems.  Increase clear liquids for the next 24 hours and advance as tolerated  Take the nausea medications as needed  Take the Prilosec for the next 2 weeks.  Follow-up with your primary care doctor or [facility] for recheck within the next 1 week

## 2022-01-26 NOTE — ED PROVIDER NOTES
"ED Provider Note     Scribed for Ashlie Quinn D.O. by Jasmyn Alonzo. 1/26/2022, 12:27 AM.     Primary care provider: Pcp Pt States None  Means of arrival: Ambulance         History obtained from: Patient  History limited by: None    CHIEF COMPLAINT  Chief Complaint   Patient presents with   • Alcohol Intoxication   • Epigastric Pain       HPI  Florencia Lau is a 41 y.o. female with fibromyalgia who presents to the emergency Department for evaluation of epigastric pain onset tonight. She states that it feels as if her \"stomach is on fire.\" The patient admits to drinking a large amount of alcohol over the past few days due to stress as well as celebratory reasons. She admits to associated symptoms of continuous vomiting, shortness of breath, lung pain, and vaginal odor prior to her period but denies hematochezia, hematemesis, or abnormal bowel movements. The patient additionally states that her boyfriend, who she lives with, is sick. She has started to develop a sore throat. However, they both tested negative for COVID four days ago via an at home COVID test and have not had fevers. No alleviating factors were reported. 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       REVIEW OF SYSTEMS  Pertinent positives include epigastric pain, vomiting, shortness of breath, lung pain, vaginal odor, and sore throat. Pertinent negatives include no hematochezia, hematemesis, or abnormal bowel movements.   See HPI for further details. All other systems are negative.    PAST MEDICAL HISTORY  Past Medical History:   Diagnosis Date   • Anxiety    • Anxiety and depression 06/12/2018   • Back pain    • Bowel habit changes 06/12/2018    \"Constipation/Diarrhea\"   • Bronchitis     HX OF   • Depression    • Dysfunctional uterine bleeding    • Fibromyalgia    • Hayfever 06/12/2018   • Indigestion 06/12/2018   • Insomnia    • Muscle spasm 06/12/2018    \"My neurologist told me I have a muscle spasm disorder\"   • Neck " pain    • Panic attacks    • Shoulder pain    • TMJ syndrome        FAMILY HISTORY  Family History   Problem Relation Age of Onset   • Diabetes Other    • Allergies Other         RA   • Other Other         DIVERTICULITIS, LUPUS, DDD, FM       SOCIAL HISTORY  Social History     Tobacco Use   • Smoking status: Current Every Day Smoker     Packs/day: 0.50     Years: 18.00     Pack years: 9.00     Types: Cigarettes   • Smokeless tobacco: Never Used   • Tobacco comment: 1 PK per day   Vaping Use   • Vaping Use: Never used   Substance Use Topics   • Alcohol use: No   • Drug use: No      Social History     Substance and Sexual Activity   Drug Use No       SURGICAL HISTORY  Past Surgical History:   Procedure Laterality Date   • HARDWARE REMOVAL ORTHO Left 6/18/2019    Procedure: HARDWARE REMOVAL ORTHO - FIBULA;  Surgeon: Jose Ruiz M.D.;  Location: [facility];  Service: Orthopedics   • ANKLE ARTHROSCOPY Right 12/18/2018    Procedure: ANKLE ARTHROSCOPY;  Surgeon: Jose Ruiz M.D.;  Location: [facility];  Service: Orthopedics   • LIGAMENT REPAIR Right 12/18/2018    Procedure: LIGAMENT REPAIR- ANTERIOR TALOFIBULAR/ CALCANEOFIBULAR, POSTERIOR TIBIALIS REPAIR;  Surgeon: Jose Ruiz M.D.;  Location: [facility];  Service: Orthopedics   • IRRIGATION & DEBRIDEMENT ORTHO Right 12/18/2018    Procedure: IRRIGATION & DEBRIDEMENT ORTHO-POSTERIOR TIBIALIS DEBRIDEMENT;  Surgeon: Jose Ruiz M.D.;  Location: [facility];  Service: Orthopedics   • ORIF, ANKLE Left 9/2/2018    Procedure: ANKLE ORIF;  Surgeon: Dayday Jaramillo M.D.;  Location: SURGERY [facility];  Service: Orthopedics   • BLADDER SUSPENSION  6/27/2018    Procedure: BLADDER SUSPENSION-   FOR: TRANS OBTURATOR TAPE;  Surgeon: Chapin Banks M.D.;  Location: SURGERY SAME DAY [facility];  Service: Gynecology   • OTHER      neck abscess   • TONSILLECTOMY     • TUBAL LIGATION         CURRENT  "MEDICATIONS    Current Outpatient Medications:   •  fluconazole (DIFLUCAN) 100 MG Tab, Take 1 Tablet by mouth every day., Disp: 2 Tablet, Rfl: 0  •  ketoconazole (NIZORAL) 2 % Cream, Apply to affected area daily for up to 2 weeks, Disp: 30 g, Rfl: 1  •  traZODone (DESYREL) 100 MG Tab, , Disp: , Rfl:   •  gabapentin (NEURONTIN) 600 MG tablet, Take 600 mg by mouth 3 times a day., Disp: , Rfl:   •  venlafaxine (EFFEXOR XR) 150 MG extended-release capsule, Take 150 mg by mouth every day., Disp: , Rfl:   •  lamoTRIgine (LAMICTAL) 100 MG Tab, Take 100 mg by mouth every day., Disp: , Rfl:     ALLERGIES  Allergies   Allergen Reactions   • Latex Rash   • Sulfa Drugs Unspecified     \"Muscle spasm\".       • Codeine Unspecified     \"Childhood allergie, not sure what happens\".     • Morphine Itching       PHYSICAL EXAM  VITAL SIGNS: /78   Pulse 77   Resp 18   SpO2 96%     Constitutional: Very intoxicated. Patient is well developed, well nourished. Moderate distress.   HENT: Normocephalic, atraumatic.strong odor of alcohol on breath.  Oropharynx moist without erythema or exudates.  Eyes: PERRL, EOMI.   Neck: Supple.  Cardiovascular: Tachycardic heart rate and Regular rhythm. No murmur  Thorax & Lungs: Clear and equal breath sounds with good excursion. No respiratory distress  Abdomen: Abdomen diffusely tender more so in epigastric area, no flank tenderness, Bowel sounds normal in all four quadrants. Soft, no rebound , guarding, palpable masses.   Skin: Warm, Dry  Extremities: Peripheral pulses 4/4 normal range of motion  Neurologic: Repetitive. Alert & oriented x 3, Normal motor function, Normal sensory function,  DTR's 4/4 bilaterally.  Psychiatric: Affect normal, Judgment normal, Mood normal.      DIAGNOSTICS/PROCEDURES    LABS  Results for orders placed or performed during the hospital encounter of 01/25/22   CBC WITH DIFFERENTIAL   Result Value Ref Range    WBC 13.4 (H) 4.8 - 10.8 K/uL    RBC 4.96 4.20 - 5.40 M/uL    " Hemoglobin 15.5 12.0 - 16.0 g/dL    Hematocrit 47.8 (H) 37.0 - 47.0 %    MCV 96.4 81.4 - 97.8 fL    MCH 31.3 27.0 - 33.0 pg    MCHC 32.4 (L) 33.6 - 35.0 g/dL    RDW 46.4 35.9 - 50.0 fL    Platelet Count 261 164 - 446 K/uL    MPV 10.8 9.0 - 12.9 fL    Neutrophils-Polys 85.50 (H) 44.00 - 72.00 %    Lymphocytes 9.60 (L) 22.00 - 41.00 %    Monocytes 3.40 0.00 - 13.40 %    Eosinophils 0.50 0.00 - 6.90 %    Basophils 0.40 0.00 - 1.80 %    Immature Granulocytes 0.60 0.00 - 0.90 %    Nucleated RBC 0.00 /100 WBC    Neutrophils (Absolute) 11.46 (H) 2.00 - 7.15 K/uL    Lymphs (Absolute) 1.29 1.00 - 4.80 K/uL    Monos (Absolute) 0.45 0.00 - 0.85 K/uL    Eos (Absolute) 0.07 0.00 - 0.51 K/uL    Baso (Absolute) 0.06 0.00 - 0.12 K/uL    Immature Granulocytes (abs) 0.08 0.00 - 0.11 K/uL    NRBC (Absolute) 0.00 K/uL   COMP METABOLIC PANEL   Result Value Ref Range    Sodium 141 135 - 145 mmol/L    Potassium 3.6 3.6 - 5.5 mmol/L    Chloride 105 96 - 112 mmol/L    Co2 21 20 - 33 mmol/L    Anion Gap 15.0 7.0 - 16.0    Glucose 99 65 - 99 mg/dL    Bun 8 8 - 22 mg/dL    Creatinine 0.58 0.50 - 1.40 mg/dL    Calcium 8.1 (L) 8.4 - 10.2 mg/dL    AST(SGOT) 25 12 - 45 U/L    ALT(SGPT) 16 2 - 50 U/L    Alkaline Phosphatase 67 30 - 99 U/L    Total Bilirubin 0.2 0.1 - 1.5 mg/dL    Albumin 4.0 3.2 - 4.9 g/dL    Total Protein 6.5 6.0 - 8.2 g/dL    Globulin 2.5 1.9 - 3.5 g/dL    A-G Ratio 1.6 g/dL   LIPASE   Result Value Ref Range    Lipase 53 7 - 58 U/L   HCG QUAL SERUM   Result Value Ref Range    Beta-Hcg Qualitative Serum Negative Negative   ETHYL ALCOHOL (BLOOD)   Result Value Ref Range    Diagnostic Alcohol 92.9 (H) 0.0 - 10.0 mg/dL   ESTIMATED GFR   Result Value Ref Range    GFR If African American >60 >60 mL/min/1.73 m 2    GFR If Non African American >60 >60 mL/min/1.73 m 2       Labs reviewed by me      RADIOLOGY/PROCEDURES  DX-ABDOMEN COMPLETE WITH AP OR PA CXR   Final Result      No radiographic evidence of acute abdominal pelvic or thoracic  abnormality        Results and radiologist interpretation reviewed by me.     COURSE & MEDICAL DECISION MAKING  Pertinent Labs & Imaging studies reviewed. (See chart for details)    12:27 AM - Patient seen and evaluated at bedside. Ordered for DX-abdomen, HCG Qual, CBC with diff, CMP, Lipase, and Diagnostic alcohol to evaluate. Patient will be treated with NS infusion 1,000 mL, Zofran 4 mg, and Pepcid 40 mg for her symptoms. Differential diagnoses include, but are not limited to, Alcohol gastritis vs Constipation  Patient is intoxicated but did respond well to the Zofran and Pepcid.  She was observed for a period of time, x-rays were negative.  I examined her ears and throat and these were unremarkable as well.  She will be discharged home with prescription for omeprazole and Zofran and she is to decrease her alcohol consumption follow-up with her primary care doctor within the week and return if problems.  She is stable upon discharge.      DISPOSITION:  Patient will be discharged home in stable condition.    FOLLOW UP:  [facility] ([facility]) - Primary Care  [address] [place] [address]  [phone]  Schedule an appointment as soon as possible for a visit in 1 week  As needed, If symptoms worsen      OUTPATIENT MEDICATIONS:  New Prescriptions    OMEPRAZOLE (PRILOSEC) 40 MG DELAYED-RELEASE CAPSULE    Take 1 Capsule by mouth every day.    ONDANSETRON (ZOFRAN ODT) 4 MG TABLET DISPERSIBLE    Take 1 Tablet by mouth every 6 hours as needed for Nausea.         FINAL IMPRESSION  1. Acute alcoholic intoxication without complication (HCC)    2. Alcohol abuse    3. Nausea and vomiting, intractability of vomiting not specified, unspecified vomiting type    4. Epigastric abdominal pain         Jasmyn AHUMADA), abdirahman scribing for, and in the presence of, Ashlie Quinn D.O..    Electronically signed by: Jasmyn Tolbert), 1/26/2022    Ashlie AHUMADA D.O. personally performed the services described in  this documentation, as scribed by Jasmyn Alonzo in my presence, and it is both accurate and complete.    The note accurately reflects work and decisions made by me.  Ashlie Quinn D.O.  1/27/2022  12:42 AM

## 2022-01-29 ENCOUNTER — OFFICE VISIT (OUTPATIENT)
Dept: URGENT CARE | Facility: CLINIC | Age: 42
End: 2022-01-29

## 2022-01-29 ENCOUNTER — HOSPITAL ENCOUNTER (OUTPATIENT)
Facility: MEDICAL CENTER | Age: 42
End: 2022-01-29
Attending: PHYSICIAN ASSISTANT

## 2022-01-29 VITALS
HEART RATE: 70 BPM | HEIGHT: 67 IN | TEMPERATURE: 97.2 F | SYSTOLIC BLOOD PRESSURE: 104 MMHG | BODY MASS INDEX: 22.76 KG/M2 | OXYGEN SATURATION: 96 % | DIASTOLIC BLOOD PRESSURE: 60 MMHG | RESPIRATION RATE: 16 BRPM | WEIGHT: 145 LBS

## 2022-01-29 DIAGNOSIS — B96.89 BACTERIAL VAGINOSIS: ICD-10-CM

## 2022-01-29 DIAGNOSIS — R05.9 COUGH: ICD-10-CM

## 2022-01-29 DIAGNOSIS — J02.9 SORE THROAT: ICD-10-CM

## 2022-01-29 DIAGNOSIS — N76.0 BACTERIAL VAGINOSIS: ICD-10-CM

## 2022-01-29 DIAGNOSIS — B97.89 VIRAL RESPIRATORY ILLNESS: ICD-10-CM

## 2022-01-29 DIAGNOSIS — J98.8 VIRAL RESPIRATORY ILLNESS: ICD-10-CM

## 2022-01-29 DIAGNOSIS — J01.90 ACUTE NON-RECURRENT SINUSITIS, UNSPECIFIED LOCATION: ICD-10-CM

## 2022-01-29 DIAGNOSIS — N89.8 VAGINAL DISCHARGE: ICD-10-CM

## 2022-01-29 DIAGNOSIS — B37.9 ANTIBIOTIC-INDUCED YEAST INFECTION: ICD-10-CM

## 2022-01-29 DIAGNOSIS — T36.95XA ANTIBIOTIC-INDUCED YEAST INFECTION: ICD-10-CM

## 2022-01-29 PROCEDURE — U0003 INFECTIOUS AGENT DETECTION BY NUCLEIC ACID (DNA OR RNA); SEVERE ACUTE RESPIRATORY SYNDROME CORONAVIRUS 2 (SARS-COV-2) (CORONAVIRUS DISEASE [COVID-19]), AMPLIFIED PROBE TECHNIQUE, MAKING USE OF HIGH THROUGHPUT TECHNOLOGIES AS DESCRIBED BY CMS-2020-01-R: HCPCS

## 2022-01-29 PROCEDURE — 87660 TRICHOMONAS VAGIN DIR PROBE: CPT

## 2022-01-29 PROCEDURE — 99214 OFFICE O/P EST MOD 30 MIN: CPT | Performed by: PHYSICIAN ASSISTANT

## 2022-01-29 PROCEDURE — U0005 INFEC AGEN DETEC AMPLI PROBE: HCPCS

## 2022-01-29 PROCEDURE — 87070 CULTURE OTHR SPECIMN AEROBIC: CPT

## 2022-01-29 PROCEDURE — 87510 GARDNER VAG DNA DIR PROBE: CPT

## 2022-01-29 PROCEDURE — 87480 CANDIDA DNA DIR PROBE: CPT

## 2022-01-29 RX ORDER — AMOXICILLIN AND CLAVULANATE POTASSIUM 875; 125 MG/1; MG/1
1 TABLET, FILM COATED ORAL 2 TIMES DAILY
Qty: 14 TABLET | Refills: 0 | Status: SHIPPED | OUTPATIENT
Start: 2022-01-29 | End: 2022-02-05

## 2022-01-29 RX ORDER — FLUCONAZOLE 150 MG/1
150 TABLET ORAL DAILY
Qty: 1 TABLET | Refills: 0 | Status: SHIPPED | OUTPATIENT
Start: 2022-01-29 | End: 2022-01-30

## 2022-01-29 RX ORDER — CLINDAMYCIN PHOSPHATE 20 MG/G
1 CREAM VAGINAL NIGHTLY
Qty: 40 G | Refills: 0 | Status: SHIPPED | OUTPATIENT
Start: 2022-01-29 | End: 2022-02-05

## 2022-01-29 ASSESSMENT — ENCOUNTER SYMPTOMS
EYE DISCHARGE: 0
EYE REDNESS: 0
VOMITING: 1
HEADACHES: 1
MYALGIAS: 1
SHORTNESS OF BREATH: 1
COUGH: 1
SORE THROAT: 1
FEVER: 1

## 2022-01-29 ASSESSMENT — FIBROSIS 4 INDEX: FIB4 SCORE: 0.96

## 2022-01-29 NOTE — PROGRESS NOTES
Subjective     Florencia Lau is a 41 y.o. female who presents with Coronavirus Screening (Fever, headaches, bodyaches, sore throats, can't smell, possible sinus infection, x1 week ) and Other (possible BV, odor, x1 month )        URI   This is a new problem. Episode onset: x 1 week ago. The problem has been unchanged. Maximum temperature: The patient reports an intermittent subjective fever. Associated symptoms include congestion, coughing, headaches, a sore throat and vomiting (The patient reports intermittent vomiting at the onset of symptoms, now resolved.). Pertinent negatives include no chest pain. Treatments tried: [place] [facility].     The patient reports no known exposure to COVID-19.  The patient states her boyfriend is sick with similar symptoms.  The patient has not been vaccinated against COVID-19.  The patient states she took an at-home COVID-19 test, which was negative.    The patient is also complaining of possible BV.  The patient reports a history of recurrent BV.  The patient states she has been experiencing an intermittent vaginal odor with associated vaginal irritation and abnormal vaginal discharge x1 month.  The patient notes slight itchiness to the genital area.  The patient states her symptoms are similar to her previous BV infections.    PMH:  has a past medical history of Anxiety, Anxiety and depression (06/12/2018), Back pain, Bowel habit changes (06/12/2018), Bronchitis, Depression, Dysfunctional uterine bleeding, Fibromyalgia, Hayfever (06/12/2018), Indigestion (06/12/2018), Insomnia, Muscle spasm (06/12/2018), Neck pain, Panic attacks, Shoulder pain, and TMJ syndrome.  MEDS:   Current Outpatient Medications:   •  gabapentin (NEURONTIN) 600 MG tablet, Take 600 mg by mouth 3 times a day., Disp: , Rfl:   •  ondansetron (ZOFRAN ODT) 4 MG TABLET DISPERSIBLE, Take 1 Tablet by mouth every 6 hours as needed for Nausea., Disp: 10 Tablet, Rfl: 0  •  omeprazole (PRILOSEC) 40 MG  "delayed-release capsule, Take 1 Capsule by mouth every day., Disp: 14 Capsule, Rfl: 0  •  fluconazole (DIFLUCAN) 100 MG Tab, Take 1 Tablet by mouth every day., Disp: 2 Tablet, Rfl: 0  •  ketoconazole (NIZORAL) 2 % Cream, Apply to affected area daily for up to 2 weeks, Disp: 30 g, Rfl: 1  •  traZODone (DESYREL) 100 MG Tab, , Disp: , Rfl:   •  venlafaxine (EFFEXOR XR) 150 MG extended-release capsule, Take 150 mg by mouth every day., Disp: , Rfl:   •  lamoTRIgine (LAMICTAL) 100 MG Tab, Take 100 mg by mouth every day., Disp: , Rfl:   ALLERGIES:   Allergies   Allergen Reactions   • Latex Rash   • Sulfa Drugs Unspecified     \"Muscle spasm\".       • Codeine Unspecified     \"Childhood allergie, not sure what happens\".     • Morphine Itching     SURGHX:   Past Surgical History:   Procedure Laterality Date   • HARDWARE REMOVAL ORTHO Left 6/18/2019    Procedure: HARDWARE REMOVAL ORTHO - FIBULA;  Surgeon: Jose Ruiz M.D.;  Location: [facility];  Service: Orthopedics   • ANKLE ARTHROSCOPY Right 12/18/2018    Procedure: ANKLE ARTHROSCOPY;  Surgeon: Jose Ruiz M.D.;  Location: [facility];  Service: Orthopedics   • LIGAMENT REPAIR Right 12/18/2018    Procedure: LIGAMENT REPAIR- ANTERIOR TALOFIBULAR/ CALCANEOFIBULAR, POSTERIOR TIBIALIS REPAIR;  Surgeon: Jose Ruiz M.D.;  Location: [facility];  Service: Orthopedics   • IRRIGATION & DEBRIDEMENT ORTHO Right 12/18/2018    Procedure: IRRIGATION & DEBRIDEMENT ORTHO-POSTERIOR TIBIALIS DEBRIDEMENT;  Surgeon: Jose Ruiz M.D.;  Location: SURGERY [facility];  Service: Orthopedics   • ORIF, ANKLE Left 9/2/2018    Procedure: ANKLE ORIF;  Surgeon: Dayday Jaramillo M.D.;  Location: [facility];  Service: Orthopedics   • BLADDER SUSPENSION  6/27/2018    Procedure: BLADDER SUSPENSION-   FOR: TRANS OBTURATOR TAPE;  Surgeon: Chapin Banks M.D.;  Location: SURGERY SAME DAY [facility];  Service: Gynecology   • " "OTHER      neck abscess   • TONSILLECTOMY     • TUBAL LIGATION       SOCHX:  reports that she has been smoking cigarettes. She has a 9.00 pack-year smoking history. She has never used smokeless tobacco. She reports that she does not drink alcohol and does not use drugs.  FH: Family history was reviewed, no pertinent findings to report      Review of Systems   Constitutional: Positive for fever (The patient reports an intermittent subjective fever.) and malaise/fatigue.   HENT: Positive for congestion and sore throat.    Eyes: Negative for discharge and redness.   Respiratory: Positive for cough and shortness of breath.    Cardiovascular: Negative for chest pain and leg swelling.   Gastrointestinal: Positive for vomiting (The patient reports intermittent vomiting at the onset of symptoms, now resolved.).   Musculoskeletal: Positive for myalgias.   Neurological: Positive for headaches.              Objective     /60 (BP Location: Left arm, Patient Position: Sitting, BP Cuff Size: Adult long)   Pulse 70   Temp 36.2 °C (97.2 °F) (Temporal)   Resp 16   Ht 1.702 m (5' 7\")   Wt 65.8 kg (145 lb)   SpO2 96%   BMI 22.71 kg/m²      Physical Exam  Constitutional:       General: She is not in acute distress.     Appearance: Normal appearance. She is not ill-appearing.   HENT:      Head: Normocephalic and atraumatic.      Right Ear: External ear normal.      Left Ear: External ear normal.      Nose: Nose normal.      Mouth/Throat:      Mouth: Mucous membranes are moist.      Pharynx: Oropharynx is clear. Posterior oropharyngeal erythema present.   Eyes:      Extraocular Movements: Extraocular movements intact.      Conjunctiva/sclera: Conjunctivae normal.   Cardiovascular:      Rate and Rhythm: Normal rate and regular rhythm.      Heart sounds: Normal heart sounds.   Pulmonary:      Effort: Pulmonary effort is normal. No respiratory distress.      Breath sounds: Normal breath sounds. No wheezing.   Genitourinary:     " Comments: Exam deferred.  Musculoskeletal:         General: Normal range of motion.      Cervical back: Normal range of motion and neck supple.   Skin:     General: Skin is warm and dry.   Neurological:      Mental Status: She is alert and oriented to person, place, and time.               Progress:  COVID-19 PCR - pending     Throat Culture - pending    Vaginal Pathogens Swab - pending               Assessment & Plan        1. Viral respiratory illness  - SARS-CoV-2 PCR (24 hour In-House): Collect NP swab in VTM; Future    2. Cough    3. Sore throat  - CULTURE THROAT; Future    4. Acute non-recurrent sinusitis, unspecified location  - amoxicillin-clavulanate (AUGMENTIN) 875-125 MG Tab; Take 1 Tablet by mouth 2 times a day for 7 days.  Dispense: 14 Tablet; Refill: 0  --Contingent antibiotic prescription given to patient to fill upon meeting criteria of guidelines discussed.     5. Vaginal discharge  - VAGINAL PATHOGENS DNA PANEL; Future    6. Bacterial vaginosis  - clindamycin (CLEOCIN) 2 % vaginal cream; Insert 1 Applicator into the vagina every evening for 7 days.  Dispense: 40 g; Refill: 0    7. Antibiotic-induced yeast infection  - fluconazole (DIFLUCAN) 150 MG tablet; Take 1 Tablet by mouth every day for 1 dose.  Dispense: 1 Tablet; Refill: 0    The patient's presenting symptoms and physical exam endings are consistent with a viral respiratory illness with associated cough and sore throat.  On physical exam, the patient had slight erythema to the posterior oropharynx without tonsil hypertrophy or exudates.  The remainder the patient's physical exam today in clinic was normal.  The patient's lungs were clear to auscultation without wheezing, and her pulse ox was within normal limits.  The patient appears in no acute distress.  The patient's vital signs are stable and within normal limits.  She is afebrile today in clinic.  Discussed likely viral etiology with the patient.  Based on the patient's presenting  symptoms, will test the patient for COVID-19.  Advised patient stay at home under self quarantine per CDC guidelines.  Will also culture the patient's throat to rule out a possible bacterial infection.  The patient is concerned about a possible sinus infection.  The patient is requesting antibiotics at this time.  The patient is worried that her symptoms may continue to progress if she is not prescribed antibiotics.  Informed the patient that it typically takes 10-14 days of ongoing symptoms to develop a subsequent bacterial sinusitis.  The patient verbalized understanding.  Will prescribe the patient a contingent antibiotic to fill upon meeting the criteria discussed.  Patient states she is prone to antibiotic-induced yeast infections, and is also requesting Diflucan at this time.  Will prescribe the patient Diflucan as requested.  Additionally, the patient is complaining of possible bacterial vaginosis.  The patient reports a history of recurrent bacterial vaginosis.  A vaginal pathogen swab was obtained today in clinic to further evaluate the patient's possible BV.  Will prescribe the patient vaginal clindamycin cream for presumed BV infection at this time.  Advised patient to monitor worsening signs and or symptoms.  Recommend OTC medications and supportive care for symptomatic management.  Recommend patient follow-up with her PCP as needed.  Discussed return precautions with the patient, and she verbalized understanding.    Differential diagnoses, supportive care, and indications for immediate follow-up discussed with patient.   Instructed to return to clinic or nearest emergency department for any change in condition, further concerns, or worsening of symptoms.    OTC Tylenol or Motrin for fever/discomfort.  OTC cough/cold medication for symptomatic relief  OTC Supportive Care for Congestion - saline nasal spray or neti pot  Drink plenty of fluids  Advised the patient to stay at home under self-isolation until  symptoms have been present for at least 10 days and are improved, and there has been no fever for at least 72 hours without the use of medications and/or no vomiting or diarrhea for 48 hours.  Follow-up with PCP  Return to clinic or go to the ED if symptoms worsen or fail to improve, or if the patient should develop worsening/increasing cough, congestion, ear pain, sore throat, shortness of breath, wheezing, chest pain, fever/chills, and/or any concerning symptoms.    Discussed plan with the patient, and she agrees to the above.     I personally reviewed prior external notes and test results pertinent to today's visit.  I have independently reviewed and interpreted all diagnostics ordered during this urgent care visit.     Time spent evaluating this patient was at least 30 minutes and includes preparing for visit, obtaining history, exam and evaluation, ordering labs/tests/procedures/medications, independent interpretation, and counseling/education.    Please note that this dictation was created using voice recognition software. I have made every reasonable attempt to correct obvious errors, but I expect that there may be errors of grammar and possibly content that I did not discover before finalizing the note.     This note was electronically signed by Catarina Morrison PA-C

## 2022-01-30 LAB
CANDIDA DNA VAG QL PROBE+SIG AMP: NEGATIVE
COVID ORDER STATUS COVID19: NORMAL
G VAGINALIS DNA VAG QL PROBE+SIG AMP: POSITIVE
SARS-COV-2 RNA RESP QL NAA+PROBE: DETECTED
SPECIMEN SOURCE: ABNORMAL
T VAGINALIS DNA VAG QL PROBE+SIG AMP: NEGATIVE

## 2022-02-01 LAB
BACTERIA SPEC RESP CULT: NORMAL
SIGNIFICANT IND 70042: NORMAL
SITE SITE: NORMAL
SOURCE SOURCE: NORMAL

## 2022-02-03 ENCOUNTER — HOSPITAL ENCOUNTER (EMERGENCY)
Facility: MEDICAL CENTER | Age: 42
End: 2022-02-03
Attending: EMERGENCY MEDICINE
Payer: COMMERCIAL

## 2022-02-03 VITALS
SYSTOLIC BLOOD PRESSURE: 131 MMHG | HEART RATE: 85 BPM | TEMPERATURE: 99.5 F | RESPIRATION RATE: 16 BRPM | OXYGEN SATURATION: 99 % | BODY MASS INDEX: 21.66 KG/M2 | WEIGHT: 138.01 LBS | DIASTOLIC BLOOD PRESSURE: 90 MMHG | HEIGHT: 67 IN

## 2022-02-03 DIAGNOSIS — J45.21 MILD INTERMITTENT ASTHMA WITH ACUTE EXACERBATION: ICD-10-CM

## 2022-02-03 DIAGNOSIS — U07.1 COVID: ICD-10-CM

## 2022-02-03 PROCEDURE — 99283 EMERGENCY DEPT VISIT LOW MDM: CPT

## 2022-02-03 PROCEDURE — 700111 HCHG RX REV CODE 636 W/ 250 OVERRIDE (IP): Performed by: EMERGENCY MEDICINE

## 2022-02-03 PROCEDURE — 96372 THER/PROPH/DIAG INJ SC/IM: CPT

## 2022-02-03 RX ORDER — KETOROLAC TROMETHAMINE 30 MG/ML
15 INJECTION, SOLUTION INTRAMUSCULAR; INTRAVENOUS ONCE
Status: COMPLETED | OUTPATIENT
Start: 2022-02-03 | End: 2022-02-03

## 2022-02-03 RX ORDER — ALBUTEROL SULFATE 90 UG/1
2 AEROSOL, METERED RESPIRATORY (INHALATION) EVERY 4 HOURS PRN
Qty: 1 EACH | Refills: 0 | Status: SHIPPED | OUTPATIENT
Start: 2022-02-03 | End: 2022-04-01

## 2022-02-03 RX ORDER — PREDNISONE 20 MG/1
40 TABLET ORAL DAILY
Qty: 10 TABLET | Refills: 0 | Status: SHIPPED | OUTPATIENT
Start: 2022-02-03 | End: 2022-02-08

## 2022-02-03 RX ADMIN — KETOROLAC TROMETHAMINE 15 MG: 30 INJECTION, SOLUTION INTRAMUSCULAR at 13:08

## 2022-02-03 ASSESSMENT — FIBROSIS 4 INDEX: FIB4 SCORE: 0.96

## 2022-02-03 NOTE — ED TRIAGE NOTES
Pt to triage .  Chief Complaint   Patient presents with   • Coronavirus Screening     pt states she was dx with covid saturday    • Shortness of Breath   • N/V

## 2022-02-03 NOTE — ED PROVIDER NOTES
"ED Provider Note    Scribed for Jose Choi M.D. by Naresh Thorpe. 2/3/2022, 12:48 PM.    Primary care provider: Pcp Pt States None  Means of arrival: Walk-in  History obtained from: Patient  History limited by: None    CHIEF COMPLAINT  Chief Complaint   Patient presents with    Coronavirus Screening     pt states she was dx with covid saturday     Shortness of Breath    N/V     HPI  Florencia Lau is a 41 y.o. female with a history of asthma and chronic bronchitis who presents to the Emergency Department for worsening shortness of breath onset 5 days ago. She states that she was tested positive for COVID on 1/29. She reports associated chest tightness, cough, sore throat, nausea, and vomiting. She states that it feels like her \"lungs feel heavy\". No alleviating or exacerbating factors were identified. She denies associated fever, ear pain, or body aches.    REVIEW OF SYSTEMS  As above.    PAST MEDICAL HISTORY   has a past medical history of Anxiety, Anxiety and depression (06/12/2018), Back pain, Bowel habit changes (06/12/2018), Bronchitis, Depression, Dysfunctional uterine bleeding, Fibromyalgia, Hayfever (06/12/2018), Indigestion (06/12/2018), Insomnia, Muscle spasm (06/12/2018), Neck pain, Panic attacks, Shoulder pain, and TMJ syndrome.    SURGICAL HISTORY   has a past surgical history that includes tonsillectomy; tubal ligation; other; bladder suspension (6/27/2018); orif, ankle (Left, 9/2/2018); ankle arthroscopy (Right, 12/18/2018); ligament repair (Right, 12/18/2018); irrigation & debridement ortho (Right, 12/18/2018); and hardware removal ortho (Left, 6/18/2019).    SOCIAL HISTORY  Social History     Tobacco Use    Smoking status: Current Every Day Smoker     Packs/day: 0.50     Years: 18.00     Pack years: 9.00     Types: Cigarettes    Smokeless tobacco: Never Used    Tobacco comment: 1 PK per day   Vaping Use    Vaping Use: Never used   Substance Use Topics    Alcohol use: No    Drug use: No " "     Social History     Substance and Sexual Activity   Drug Use No     FAMILY HISTORY  Family History   Problem Relation Age of Onset    Diabetes Other     Allergies Other         RA    Other Other         DIVERTICULITIS, LUPUS, DDD, FM     CURRENT MEDICATIONS  Home Medications       Reviewed by Va Mccoy R.N. (Registered Nurse) on 02/03/22 at 1118  Med List Status: Partial     Medication Last Dose Status   amoxicillin-clavulanate (AUGMENTIN) 875-125 MG Tab has not started Active   clindamycin (CLEOCIN) 2 % vaginal cream has not started Active   fluconazole (DIFLUCAN) 100 MG Tab has not started Active   gabapentin (NEURONTIN) 600 MG tablet 2/3/2022 Active   ketoconazole (NIZORAL) 2 % Cream  Active                  ALLERGIES  Allergies   Allergen Reactions    Latex Rash    Sulfa Drugs Unspecified     \"Muscle spasm\".        Codeine Unspecified     \"Childhood allergie, not sure what happens\".      Morphine Itching     PHYSICAL EXAM  VITAL SIGNS: /99   Pulse (!) 102   Temp 36.9 °C (98.4 °F) (Temporal)   Resp 16   Ht 1.702 m (5' 7\")   Wt 62.6 kg (138 lb 0.1 oz)   LMP 01/27/2022   SpO2 97%   BMI 21.62 kg/m²   Constitutional: Well developed, Well nourished, No acute distress, Non-toxic appearance.   HENT: Normocephalic, Atraumatic, Bilateral external ears normal, oropharynx moist, No oral exudates, Nose normal.   Eyes:conjunctiva is normal, there are no signs of exudate.   Neck: Supple, no meningeal signs.  Lymphatic: No lymphadenopathy noted.   Cardiovascular: Regular rate and rhythm without murmurs gallops or rubs.   Thorax & Lungs: Breath sounds diminished throughout, Mild expiratory wheezes, there are no rales. Chest wall is nontender.    COURSE & MEDICAL DECISION MAKING  Pertinent Labs & Imaging studies reviewed. (See chart for details)    12:48 PM - Patient seen and examined at bedside. Patient will be treated with Toradol 15mg. I discussed plan for discharge and follow up as outlined below. The " patient is stable for discharge at this time and will return for any new or worsening symptoms. Patient verbalizes understanding and support with my plan for discharge.     Decision Making:  Patient presents for evaluation.  Clinically I do feel that she has asthma with exacerbation so I do feel that the prednisone and albuterol is helpful for this.  The patient does recently have COVID she has no signs of pneumonia on clinical evaluation.  I feel the patient is stable for discharge.  Prior to discharge the patient did request a Toradol injection because she was having some muscle aches and pains.  I will give her the Toradol injection.    The patient is referred to a primary physician for blood pressure management, diabetic screening, and for all other preventative health concerns.    DISPOSITION:  Patient will be discharged home in stable condition.    OUTPATIENT MEDICATIONS:  Discharge Medication List as of 2/3/2022  1:00 PM        START taking these medications    Details   predniSONE (DELTASONE) 20 MG Tab Take 2 Tablets by mouth every day for 5 days., Disp-10 Tablet, R-0, Normal      albuterol 108 (90 Base) MCG/ACT Aero Soln inhalation aerosol Inhale 2 Puffs every four hours as needed for Shortness of Breath., Disp-1 Each, R-0, Normal           FINAL IMPRESSION  1. Mild intermittent asthma with acute exacerbation    2. COVID        Naresh AHUMADA (Scribe), am scribing for, and in the presence of, Jose Choi M.D..    Electronically signed by: Naresh Thorpe ([place]), 2/3/2022    Jose AHUMADA M.D. personally performed the services described in this documentation, as scribed by Naresh Thorpe in my presence, and it is both accurate and complete. E.    The note accurately reflects work and decisions made by me.  Jose Choi M.D.  2/3/2022  5:20 PM

## 2022-02-03 NOTE — ED NOTES
Discharge instructions given and explained to pt including Rx and f/u. All questions answered and pt verbalized understanding.

## 2022-04-01 ENCOUNTER — HOSPITAL ENCOUNTER (OUTPATIENT)
Facility: MEDICAL CENTER | Age: 42
End: 2022-04-01
Attending: NURSE PRACTITIONER

## 2022-04-01 ENCOUNTER — OFFICE VISIT (OUTPATIENT)
Dept: URGENT CARE | Facility: CLINIC | Age: 42
End: 2022-04-01

## 2022-04-01 VITALS
OXYGEN SATURATION: 96 % | HEART RATE: 104 BPM | TEMPERATURE: 98.2 F | RESPIRATION RATE: 24 BRPM | BODY MASS INDEX: 20.72 KG/M2 | DIASTOLIC BLOOD PRESSURE: 60 MMHG | WEIGHT: 132 LBS | HEIGHT: 67 IN | SYSTOLIC BLOOD PRESSURE: 112 MMHG

## 2022-04-01 DIAGNOSIS — R53.83 OTHER FATIGUE: ICD-10-CM

## 2022-04-01 DIAGNOSIS — N76.1 CHRONIC VAGINITIS: ICD-10-CM

## 2022-04-01 DIAGNOSIS — R05.3 COVID-19 LONG HAULER MANIFESTING CHRONIC COUGH: ICD-10-CM

## 2022-04-01 DIAGNOSIS — R06.02 SHORTNESS OF BREATH: ICD-10-CM

## 2022-04-01 DIAGNOSIS — R05.9 COUGH: ICD-10-CM

## 2022-04-01 DIAGNOSIS — U09.9 COVID-19 LONG HAULER MANIFESTING CHRONIC COUGH: ICD-10-CM

## 2022-04-01 LAB — COVID ORDER STATUS COVID19: NORMAL

## 2022-04-01 PROCEDURE — 87660 TRICHOMONAS VAGIN DIR PROBE: CPT

## 2022-04-01 PROCEDURE — U0005 INFEC AGEN DETEC AMPLI PROBE: HCPCS

## 2022-04-01 PROCEDURE — 99214 OFFICE O/P EST MOD 30 MIN: CPT | Performed by: NURSE PRACTITIONER

## 2022-04-01 PROCEDURE — 87480 CANDIDA DNA DIR PROBE: CPT

## 2022-04-01 PROCEDURE — U0003 INFECTIOUS AGENT DETECTION BY NUCLEIC ACID (DNA OR RNA); SEVERE ACUTE RESPIRATORY SYNDROME CORONAVIRUS 2 (SARS-COV-2) (CORONAVIRUS DISEASE [COVID-19]), AMPLIFIED PROBE TECHNIQUE, MAKING USE OF HIGH THROUGHPUT TECHNOLOGIES AS DESCRIBED BY CMS-2020-01-R: HCPCS

## 2022-04-01 PROCEDURE — 87510 GARDNER VAG DNA DIR PROBE: CPT

## 2022-04-01 RX ORDER — METRONIDAZOLE 7.5 MG/G
1 GEL VAGINAL
Qty: 5 EACH | Refills: 0 | Status: SHIPPED | OUTPATIENT
Start: 2022-04-01 | End: 2022-04-06

## 2022-04-01 RX ORDER — ALBUTEROL SULFATE 90 UG/1
2 AEROSOL, METERED RESPIRATORY (INHALATION) EVERY 6 HOURS PRN
Qty: 8.5 G | Refills: 0 | Status: SHIPPED | OUTPATIENT
Start: 2022-04-01

## 2022-04-01 ASSESSMENT — FIBROSIS 4 INDEX: FIB4 SCORE: 0.98

## 2022-04-01 NOTE — PROGRESS NOTES
"Subjective:   Florencia Lau is a 42 y.o. female who presents for Coronavirus Screening (SOB post Covid x 1.5 months ago, cough, BV)       HPI  Patient presents for evaluation of cough, chest heaviness, and shortness of breath since her diagnosis of Covid on 1/29.  Patient states that she was able to clear most of her symptoms, however continues to feel this way most days.  Has a known history of asthma as well as visit smoking history, she states that she not have any respiratory issues on a daily basis until Covid.  Will use her albuterol inhaler as needed, however not since yesterday.    Initially, patient states she has a several day history of vaginal irritation, burning, thin, milky white discharge, and foul odor.  Patient states she is a history of chronic BV.  Does not have a gynecologist that she is established with.  Is unaware of triggers.  She has not tried anything yet for the symptoms.    ROS  All other systems are negative except as documented above within HPI.    MEDS:   Current Outpatient Medications:   •  albuterol 108 (90 Base) MCG/ACT Aero Soln inhalation aerosol, Inhale 2 Puffs every four hours as needed for Shortness of Breath., Disp: 1 Each, Rfl: 0  •  gabapentin (NEURONTIN) 600 MG tablet, Take 600 mg by mouth 3 times a day., Disp: , Rfl:   •  fluconazole (DIFLUCAN) 100 MG Tab, Take 1 Tablet by mouth every day. (Patient not taking: Reported on 4/1/2022), Disp: 2 Tablet, Rfl: 0  •  ketoconazole (NIZORAL) 2 % Cream, Apply to affected area daily for up to 2 weeks (Patient not taking: Reported on 4/1/2022), Disp: 30 g, Rfl: 1  ALLERGIES:   Allergies   Allergen Reactions   • Latex Rash   • Sulfa Drugs Unspecified     \"Muscle spasm\".       • Codeine Unspecified     \"Childhood allergie, not sure what happens\".     • Morphine Itching       Patient's PMH, SocHx, SurgHx, FamHx, Drug allergies and medications were reviewed.     Objective:   /60 (BP Location: Left arm, Patient Position: " "Sitting, BP Cuff Size: Adult long)   Pulse (!) 104   Temp 36.8 °C (98.2 °F) (Temporal)   Resp (!) 24   Ht 1.702 m (5' 7\")   Wt 59.9 kg (132 lb)   SpO2 96%   BMI 20.67 kg/m²     Physical Exam  Vitals and nursing note reviewed.   Constitutional:       General: She is awake.      Appearance: Normal appearance. She is well-developed and normal weight.   HENT:      Head: Normocephalic and atraumatic.      Right Ear: Tympanic membrane, ear canal and external ear normal.      Left Ear: Tympanic membrane, ear canal and external ear normal.      Nose: Nose normal.      Mouth/Throat:      Lips: Pink.      Mouth: Mucous membranes are moist.      Pharynx: Oropharynx is clear. Uvula midline.   Eyes:      Extraocular Movements: Extraocular movements intact.      Conjunctiva/sclera: Conjunctivae normal.      Pupils: Pupils are equal, round, and reactive to light.   Neck:      Thyroid: No thyromegaly.      Trachea: Trachea normal.   Cardiovascular:      Rate and Rhythm: Normal rate and regular rhythm.      Pulses: Normal pulses.      Heart sounds: Normal heart sounds, S1 normal and S2 normal.   Pulmonary:      Effort: Pulmonary effort is normal. No respiratory distress.      Breath sounds: Normal breath sounds. No wheezing, rhonchi or rales.   Abdominal:      General: Bowel sounds are normal.      Palpations: Abdomen is soft.   Musculoskeletal:         General: Normal range of motion.      Cervical back: Full passive range of motion without pain, normal range of motion and neck supple.   Lymphadenopathy:      Cervical: No cervical adenopathy.   Skin:     General: Skin is warm and dry.      Capillary Refill: Capillary refill takes less than 2 seconds.   Neurological:      General: No focal deficit present.      Mental Status: She is alert and oriented to person, place, and time.      Gait: Gait is intact.   Psychiatric:         Attention and Perception: Attention and perception normal.         Mood and Affect: Mood normal.    "      Speech: Speech normal.         Behavior: Behavior normal. Behavior is cooperative.         Thought Content: Thought content normal.         Judgment: Judgment normal.         Assessment/Plan:   Assessment    1. COVID-19 long hauler manifesting chronic cough  - albuterol 108 (90 Base) MCG/ACT Aero Soln inhalation aerosol; Inhale 2 Puffs every 6 hours as needed.  Dispense: 8.5 g; Refill: 0  - fluticasone-salmeterol (ADVAIR) 100-50 MCG/DOSE AEROSOL POWDER, BREATH ACTIVATED; Inhale 1 Puff every 12 hours.  Dispense: 1 Each; Refill: 0    2. Chronic vaginitis  - VAGINAL PATHOGENS DNA PANEL; Future  - metroNIDAZOLE (METROGEL-VAGINAL) 0.75 % Gel; Insert 1 Applicator into the vagina at bedtime for 5 days.  Dispense: 5 Each; Refill: 0    3. Cough  - albuterol 108 (90 Base) MCG/ACT Aero Soln inhalation aerosol; Inhale 2 Puffs every 6 hours as needed.  Dispense: 8.5 g; Refill: 0  - fluticasone-salmeterol (ADVAIR) 100-50 MCG/DOSE AEROSOL POWDER, BREATH ACTIVATED; Inhale 1 Puff every 12 hours.  Dispense: 1 Each; Refill: 0  - SARS-CoV-2 PCR (24 hour In-House): Collect NP swab in VTM; Future    4. Other fatigue  - SARS-CoV-2 PCR (24 hour In-House): Collect NP swab in VTM; Future    5. Shortness of breath  - albuterol 108 (90 Base) MCG/ACT Aero Soln inhalation aerosol; Inhale 2 Puffs every 6 hours as needed.  Dispense: 8.5 g; Refill: 0  - fluticasone-salmeterol (ADVAIR) 100-50 MCG/DOSE AEROSOL POWDER, BREATH ACTIVATED; Inhale 1 Puff every 12 hours.  Dispense: 1 Each; Refill: 0  - SARS-CoV-2 PCR (24 hour In-House): Collect NP swab in VTM; Future        Vital signs stable at today's acute urgent care visit.  Discussed with patient she did not need to obtain Covid testing today due to having Covid just 2 months ago, however she is insistent state that her employer is requesting 1 for her to return to work.  Advised to self quarantine per CDC guidelines as per usual.  Additionally, patient refill of albuterol provided to her, as  well as trial of Advair.  She has several questions if she is not COVID long-haul her, as well as benefits of Covid vaccine.  Will obtain vaginal path panel, patient is requesting medication to begin treatment for this, with the understanding that it may be inappropriate the time pending test results.  She understands and agrees to this.  Discussed management options as indicate at length.    Advised the patient to follow-up with the primary care provider for recheck, reevaluation, and/or consideration of further management. Return to urgent care with any worsening/continued symptoms.  Red flags discussed and indications to immediately call 911 or present to the ED.  All questions were encouraged and answered to the patient's satisfaction and understanding, and they agree to the plan of care.     I personally reviewed prior external notes and test results pertinent to today's visit.  I have independently reviewed and interpreted all diagnostics ordered during this urgent care acute visit. Time spent evaluating this patient was a minimum of 30 minutes and includes preparing for visit, counseling/education, exam, evaluation, obtaining history, and ordering lab/test/procedures.      Please note that this dictation was created using voice recognition software. I have made a reasonable attempt to correct obvious errors, but I expect that there are errors of grammar and possibly content that I did not discover before finalizing the note.

## 2022-04-02 LAB
CANDIDA DNA VAG QL PROBE+SIG AMP: NEGATIVE
G VAGINALIS DNA VAG QL PROBE+SIG AMP: POSITIVE
SARS-COV-2 RNA RESP QL NAA+PROBE: NOTDETECTED
SPECIMEN SOURCE: NORMAL
T VAGINALIS DNA VAG QL PROBE+SIG AMP: NEGATIVE

## 2022-12-07 NOTE — ED NOTES
Patient understands discharge instructions. Given all DC education, prescriptions, f/u appointments. Pt verbalized understanding.  In no distress. IV and telemetry dc'd. Has all belongings.  Ambulating well with steady gait. Will return for worsening symptoms.     Scheduled date of colonoscopy (as of today):3/14/23  Physician performing colonoscopy:DR ARIZMENDI  Location of colonoscopy:[facility]  Clearances: NA

## 2024-05-25 NOTE — ED NOTES
Pt reports initially ankle felt better with cast removed, but now ankle pain has increased to 8/10.    English

## 2024-12-02 NOTE — ED NOTES
Orders:    CBC; Future     Administered fleet enema at bedside. Commode placed at bedside. Pt given instructions and educated about enema, pt verbalized understanding.

## 2025-06-03 NOTE — Clinical Note
REFERRAL APPROVAL NOTICE         Sent on Es 3, 2025                   Florencia Courtney Sid  [address]  [address]  [place] [address]                   Dear Ms. Lau,    After a careful review of the medical information and benefit coverage, [facility] has processed your referral. See below for additional details.    If applicable, you must be actively enrolled with your insurance for coverage of the authorized service. If you have any questions regarding your coverage, please contact your insurance directly.    REFERRAL INFORMATION   Referral #:  [id number]  Referred-To Department    Referred-By Provider:  Urogynecology    Zuri Arrington M.D.   [facility]      [address]  [address]  [place] [address]  [phone] [address], [address]  [place] [id number] [phone]    Referral Start Date:  06/03/2025  Referral End Date:   06/03/2026             SCHEDULING  If you do not already have an appointment, please call [phone] to make an appointment.     MORE INFORMATION  If you do not already have a [facility] account, sign up at: [email or web address]  You can access your medical information, make appointments, see lab results, billing information, and more.  If you have questions regarding this referral, please contact  the [facility] Referrals department at:             [phone]. Monday - Friday 8:00AM - 5:00PM.     Sincerely,    [facility]

## 2025-06-06 ENCOUNTER — APPOINTMENT (OUTPATIENT)
Dept: RADIOLOGY | Facility: MEDICAL CENTER | Age: 45
End: 2025-06-06
Attending: EMERGENCY MEDICINE
Payer: COMMERCIAL

## 2025-06-06 ENCOUNTER — HOSPITAL ENCOUNTER (EMERGENCY)
Facility: MEDICAL CENTER | Age: 45
End: 2025-06-06
Attending: EMERGENCY MEDICINE
Payer: COMMERCIAL

## 2025-06-06 VITALS
HEART RATE: 82 BPM | RESPIRATION RATE: 16 BRPM | BODY MASS INDEX: 27.28 KG/M2 | SYSTOLIC BLOOD PRESSURE: 114 MMHG | DIASTOLIC BLOOD PRESSURE: 63 MMHG | WEIGHT: 174.16 LBS | TEMPERATURE: 97.4 F | OXYGEN SATURATION: 96 %

## 2025-06-06 DIAGNOSIS — D25.9 UTERINE LEIOMYOMA, UNSPECIFIED LOCATION: ICD-10-CM

## 2025-06-06 DIAGNOSIS — N39.0 ACUTE UTI: Primary | ICD-10-CM

## 2025-06-06 LAB
APPEARANCE UR: CLEAR
BACTERIA #/AREA URNS HPF: ABNORMAL /HPF
BILIRUB UR QL STRIP.AUTO: NEGATIVE
CASTS URNS QL MICRO: ABNORMAL /LPF (ref 0–2)
COLOR UR: YELLOW
EPITHELIAL CELLS 1715: ABNORMAL /HPF (ref 0–5)
GLUCOSE UR STRIP.AUTO-MCNC: NEGATIVE MG/DL
HCG UR QL: NEGATIVE
KETONES UR STRIP.AUTO-MCNC: NEGATIVE MG/DL
LEUKOCYTE ESTERASE UR QL STRIP.AUTO: ABNORMAL
MICRO URNS: ABNORMAL
MUCOUS THREADS URNS QL MICRO: PRESENT /HPF
NITRITE UR QL STRIP.AUTO: NEGATIVE
PH UR STRIP.AUTO: 5.5 [PH] (ref 5–8)
PROT UR QL STRIP: NEGATIVE MG/DL
RBC # URNS HPF: ABNORMAL /HPF (ref 0–2)
RBC UR QL AUTO: ABNORMAL
SP GR UR STRIP.AUTO: 1.01
UROBILINOGEN UR STRIP.AUTO-MCNC: 0.2 EU/DL
WBC #/AREA URNS HPF: ABNORMAL /HPF

## 2025-06-06 PROCEDURE — 81001 URINALYSIS AUTO W/SCOPE: CPT

## 2025-06-06 PROCEDURE — 76830 TRANSVAGINAL US NON-OB: CPT

## 2025-06-06 PROCEDURE — 81025 URINE PREGNANCY TEST: CPT

## 2025-06-06 PROCEDURE — 99283 EMERGENCY DEPT VISIT LOW MDM: CPT

## 2025-06-06 RX ORDER — FLUCONAZOLE 150 MG/1
150 TABLET ORAL DAILY
Qty: 1 TABLET | Refills: 0 | Status: ACTIVE | OUTPATIENT
Start: 2025-06-06

## 2025-06-06 RX ORDER — NITROFURANTOIN 25; 75 MG/1; MG/1
100 CAPSULE ORAL 2 TIMES DAILY
Qty: 20 CAPSULE | Refills: 0 | Status: ACTIVE | OUTPATIENT
Start: 2025-06-06

## 2025-06-06 NOTE — ED TRIAGE NOTES
Chief Complaint   Patient presents with    Post-Op Complications     Pt reports bladder mesh in vagina     Pt reports pelvic pain and nausea as well.  /68   Pulse 97   Temp 36.3 °C (97.3 °F) (Temporal)   Resp 16   Wt 79 kg (174 lb 2.6 oz)   SpO2 95%   Pt informed of wait times. Educated on triage process.  Asked to return to triage RN for any new or worsening of symptoms. Thanked for patience.

## 2025-06-06 NOTE — ED NOTES
Pt provided with UA cup for collection  Patient updated on plan of care, all questions answered at this time.     Dustin locked and in the lowest position. Pt connected to continuous monitor with call light and personal belongings within reach.

## 2025-06-06 NOTE — ED PROVIDER NOTES
"ED Provider Note    CHIEF COMPLAINT  Chief Complaint   Patient presents with    Post-Op Complications     Pt reports bladder mesh in vagina       EXTERNAL RECORDS REVIEWED  Outpatient Notes  [facility]    HPI/ROS  LIMITATION TO HISTORY   None  OUTSIDE HISTORIAN(S):  None    Florencia Lau is a 45 y.o. female who presents here for evaluation of possible mesh in her vaginal canal.  Patient has a history of the same, and was recently seen by her OB yesterday, given a vaginal cream, had ultrasound.  Patient is then due to follow-up with urogynecology on Monday.  Patient states that she is concerned because she wants to make sure that \"nothing is wrong.\"  Patient has no abdominal pain, no fever or chills and no vomiting.  She has no chest pain shortness of breath or other medical complaints at this time.  She does state that she had some dysuria, so is wanting to check urinalysis.  No excessive vaginal bleeding or discharge that is abnormal for her.  Patient states that she sees gynecology for vaginal bleeding with intercourse.    PAST MEDICAL HISTORY   has a past medical history of Anxiety, Anxiety and depression (06/12/2018), Back pain, Bowel habit changes (06/12/2018), Bronchitis, Depression, Dysfunctional uterine bleeding, Fibromyalgia, Hayfever (06/12/2018), Indigestion (06/12/2018), Insomnia, Muscle spasm (06/12/2018), Neck pain, Panic attacks, Shoulder pain, and TMJ syndrome.    SURGICAL HISTORY   has a past surgical history that includes tonsillectomy; tubal ligation; other; bladder suspension (6/27/2018); orif, ankle (Left, 9/2/2018); ankle arthroscopy (Right, 12/18/2018); ligament repair (Right, 12/18/2018); irrigation & debridement ortho (Right, 12/18/2018); and hardware removal ortho (Left, 6/18/2019).    FAMILY HISTORY  Family History   Problem Relation Age of Onset    Diabetes Other     Allergies Other         RA    Other Other         DIVERTICULITIS, LUPUS, DDD, FM       SOCIAL " HISTORY  Social History     Tobacco Use    Smoking status: Every Day     Current packs/day: 0.50     Average packs/day: 0.5 packs/day for 18.0 years (9.0 ttl pk-yrs)     Types: Cigarettes    Smokeless tobacco: Never    Tobacco comments:     1 PK per day   Vaping Use    Vaping status: Never Used   Substance and Sexual Activity    Alcohol use: No    Drug use: No    Sexual activity: Not on file       CURRENT MEDICATIONS  Home Medications       Reviewed by Lulu Nelson R.N. (Registered Nurse) on 06/06/25 at 1237  Med List Status: Partial     Medication Last Dose Status   albuterol 108 (90 Base) MCG/ACT Aero Soln inhalation aerosol  Active   fluticasone-salmeterol (ADVAIR) 100-50 MCG/DOSE AEROSOL POWDER, BREATH ACTIVATED  Active   gabapentin (NEURONTIN) 600 MG tablet  Active                    ALLERGIES  Allergies[1]    PHYSICAL EXAM  VITAL SIGNS: /62   Pulse 87   Temp 36.3 °C (97.3 °F) (Temporal)   Resp 16   Wt 79 kg (174 lb 2.6 oz)   SpO2 95%   BMI 27.28 kg/m²    Constitutional: Well developed, well nourished. No acute distress.  HEENT: Normocephalic, atraumatic. Posterior pharynx clear and moist.  Eyes:  EOMI. Normal sclera.  Neck: Supple, Full range of motion, nontender.  Chest/Pulmonary: clear to ausculation. Symmetrical expansion.   Cardio: Regular rate and rhythm with no murmur.   Abdomen: Soft, nontender. No peritoneal signs. No guarding. No palpable masses.  Musculoskeletal: No deformity, no edema, neurovascular intact.   Neuro: Clear speech, appropriate, cooperative, cranial nerves II-XII grossly intact.  Psych: Normal mood and affect      EKG/LABS  Results for orders placed or performed during the hospital encounter of 06/06/25   URINALYSIS,CULTURE IF INDICATED    Collection Time: 06/06/25  1:24 PM    Specimen: Urine, Clean Catch   Result Value Ref Range    Color Yellow     Character Clear     Specific Gravity 1.010 <1.035    Ph 5.5 5.0 - 8.0    Glucose Negative Negative mg/dL    Ketones  "Negative Negative mg/dL    Protein Negative Negative mg/dL    Bilirubin Negative Negative    Urobilinogen, Urine 0.2 <=1.0 EU/dL    Nitrite Negative Negative    Leukocyte Esterase Small (A) Negative    Occult Blood Trace (A) Negative    Micro Urine Req Microscopic    BETA-HCG QUALITATIVE URINE    Collection Time: 06/06/25  1:24 PM   Result Value Ref Range    Beta-Hcg Urine Negative Negative   URINE MICROSCOPIC (W/UA)    Collection Time: 06/06/25  1:24 PM   Result Value Ref Range    WBC 11-20 (A) /hpf    RBC 0-2 0 - 2 /hpf    Bacteria Moderate (A) None /hpf    Epithelial Cells 3-5 0 - 5 /hpf    Mucous Threads Present /hpf    Urine Casts 0-2 0 - 2 /lpf   URINE CULTURE(NEW)    Collection Time: 06/06/25  2:03 PM    Specimen: Urine   Result Value Ref Range    Significant Indicator NEG     Source UR     Site -     Culture Result -          RADIOLOGY/PROCEDURES   I have independently interpreted the diagnostic imaging associated with this visit and am waiting the final reading from the radiologist.   My preliminary interpretation is as follows: Below    Radiologist interpretation:  US-PELVIC COMPLETE (TRANSABDOMINAL/TRANSVAGINAL) (COMBO)   Final Result      1.  1.6 cm uterine fibroid.   2.  Prominent fluid-filled left Fallopian tube.   3.  Incidental normal-appearing appendix visualized.          COURSE & MEDICAL DECISION MAKING    ASSESSMENT, COURSE AND PLAN  Care Narrative: This is a 45-year-old female here for evaluation of what she believes to be some mesh in the vaginal wall.  Patient states that she is here because she went to make sure that \"nothing was wrong.\"  Patient has no abdominal pain, she has a negative pregnancy test, she does have a noted uterine fibroid and a fluid-filled fallopian tube, but no other findings noted.  She is nontoxic-appearing afebrile comfortable the plan of discharge.  She has a OB/GYN appointment on Monday morning.  She will return for any further issues or concerns.        DISPOSITION " "AND DISCUSSIONS  I have discussed management of the patient with the following physicians and MICHAEL's: None    Discussion of management with other Q or appropriate source(s): None    Escalation of care considered, and ultimately not performed: None    Barriers to care at this time, including but not limited to: None.     Decision tools and prescription drugs considered including, but not limited to: None.    FINAL DIAGNOSIS  Uterine fibroid  UTI     Electronically signed by: Daniel Amado D.O., 6/6/2025 1:30 PM           [1]   Allergies  Allergen Reactions    Latex Rash    Sulfa Drugs Unspecified     \"Muscle spasm\".        Codeine Unspecified     \"Childhood allergie, not sure what happens\".      Morphine Itching     "

## 2025-06-06 NOTE — Clinical Note
Florencia Lau was seen and treated in our emergency department on 6/6/2025.  She may return to work on 06/09/2025.       If you have any questions or concerns, please don't hesitate to call.      Daniel Amado D.O.

## 2025-06-06 NOTE — ED NOTES
ERP at bedside for re-eval.     Pt provided discharge instructions and follow-up information. Pt verbalized understanding and all questions answered. Pt ambulated from ED with steady gait carrying all belongings.

## 2025-06-09 ENCOUNTER — GYNECOLOGY VISIT (OUTPATIENT)
Dept: GYNECOLOGY | Facility: CLINIC | Age: 45
End: 2025-06-09
Payer: COMMERCIAL

## 2025-06-09 VITALS
WEIGHT: 176 LBS | DIASTOLIC BLOOD PRESSURE: 79 MMHG | HEART RATE: 77 BPM | BODY MASS INDEX: 27.62 KG/M2 | HEIGHT: 67 IN | SYSTOLIC BLOOD PRESSURE: 120 MMHG

## 2025-06-09 DIAGNOSIS — N95.2 VAGINAL ATROPHY: ICD-10-CM

## 2025-06-09 DIAGNOSIS — T83.712S: ICD-10-CM

## 2025-06-09 DIAGNOSIS — N39.0 RECURRENT UTI: Primary | ICD-10-CM

## 2025-06-09 DIAGNOSIS — N39.3 STRESS INCONTINENCE: ICD-10-CM

## 2025-06-09 PROBLEM — T83.712A: Status: ACTIVE | Noted: 2025-06-09

## 2025-06-09 LAB
APPEARANCE UR: ABNORMAL
BILIRUB UR STRIP-MCNC: NEGATIVE MG/DL
COLOR UR AUTO: ABNORMAL
GLUCOSE UR STRIP.AUTO-MCNC: NEGATIVE MG/DL
KETONES UR STRIP.AUTO-MCNC: NEGATIVE MG/DL
LEUKOCYTE ESTERASE UR QL STRIP.AUTO: ABNORMAL
NITRITE UR QL STRIP.AUTO: NEGATIVE
PH UR STRIP.AUTO: 8.5 [PH] (ref 5–8)
PROT UR QL STRIP: 30 MG/DL
RBC UR QL AUTO: ABNORMAL
SP GR UR STRIP.AUTO: 1.02
UROBILINOGEN UR STRIP-MCNC: 0.2 MG/DL

## 2025-06-09 PROCEDURE — 3074F SYST BP LT 130 MM HG: CPT | Performed by: STUDENT IN AN ORGANIZED HEALTH CARE EDUCATION/TRAINING PROGRAM

## 2025-06-09 PROCEDURE — 99204 OFFICE O/P NEW MOD 45 MIN: CPT | Performed by: STUDENT IN AN ORGANIZED HEALTH CARE EDUCATION/TRAINING PROGRAM

## 2025-06-09 PROCEDURE — 81002 URINALYSIS NONAUTO W/O SCOPE: CPT | Performed by: STUDENT IN AN ORGANIZED HEALTH CARE EDUCATION/TRAINING PROGRAM

## 2025-06-09 PROCEDURE — 3078F DIAST BP <80 MM HG: CPT | Performed by: STUDENT IN AN ORGANIZED HEALTH CARE EDUCATION/TRAINING PROGRAM

## 2025-06-09 RX ORDER — VENLAFAXINE HCL 37.5 MG
37.5 CAPSULE, EXT RELEASE 24 HR ORAL DAILY
COMMUNITY

## 2025-06-09 RX ORDER — PHENTERMINE HYDROCHLORIDE 30 MG/1
30 CAPSULE ORAL DAILY
COMMUNITY
Start: 2025-05-29

## 2025-06-09 RX ORDER — HYDROXYZINE HYDROCHLORIDE 50 MG/1
50 TABLET, FILM COATED ORAL 3 TIMES DAILY PRN
COMMUNITY

## 2025-06-09 RX ORDER — ESTRADIOL 0.1 MG/G
1 CREAM VAGINAL DAILY
COMMUNITY
Start: 2025-05-29

## 2025-06-09 RX ORDER — TRAZODONE HYDROCHLORIDE 50 MG/1
50 TABLET ORAL NIGHTLY
COMMUNITY

## 2025-06-09 NOTE — PROGRESS NOTES
Urogynecology & Reconstructive Pelvic Surgery    Florencia Lau MRN:[id number] :1980    Referred by: Zuri Roberts MD    Reason for Visit:   Chief Complaint   Patient presents with    New Patient     Consult          Subjective     History of Presenting Illness:    Florencia Lau is a 45 y.o. P1 who presents for the evaluation and management of mesh exposure.     She had a sling placed in 2018 for stress incontinence, noted is large volume leaks with most activities.  She had an overall unremarkable pain recovery and significant proved in her stress incontinence symptoms, but noted transient nerve weakness in her right leg afterwards which resolved with time.    Over the last year or so she had noticed more bleeding after intercourse, recurrent BV/yeast, vaginal discharge which did not respond to antibiotic therapy alone.  She saw her referring GYN who noted a significant sling exposure, and this was partially excised in the office.  She continues to have some vaginal discharge and notices a new mesh protrusion today.  She was seen in the ER last week due to her concern about the exposure.  He has started to have stress incontinence again after the mesh was cut, no urgency    She was started on vaginal estrogen, but stopped this after she felt the sling exposed again out of fear of seeding and infection, although I encouraged her to continue    She has a medical history notable for fibroimyalgia.  She is a former smoker, quit a few years ago.    GYN care managed by Dr. Gail Arrington.     Prior Pelvic surgery:   Tubal ligation - hydrosalpinx noted on prior imaging  2018 transobturator sling ([facility]ren) - op report reviewed.      Prior treatment:   Partial sling excision  Pelvic PT  - 2018      Pelvic floor symptom review:     Bladder:   Voids per day: 3-4 Voids per night: 1-2      Urinary incontinence episodes per day: depends on activity    Urge leakage:  None   Stress  "leakage: With Cough, With Laugh, and With Exercise   Continuous / insensible urine loss: No    Nocturnal enuresis: No    Leakage volume: Moderate   Bladder emptying: most of the time   Voiding symptoms: Hesitancy   UTI in last 12 months: yes   Other urologic history: none      Prolapse:     Prolapse symptoms: None      Bowel:    Lifelong constipation, no stool leaks      Sexual function:    Sexually active: Yes - not currently but would like to   Gender of partners: Male   Pain with intercourse: No           Past medical and surgical history      Past medical history:  Past Medical History:   Diagnosis Date    Hayfever 06/12/2018    Muscle spasm 06/12/2018    \"My neurologist told me I have a muscle spasm disorder\"    Indigestion 06/12/2018    Bowel habit changes 06/12/2018    \"Constipation/Diarrhea\"    Anxiety and depression 06/12/2018    Anxiety     Back pain     Bronchitis     HX OF    Depression     Dysfunctional uterine bleeding     Fibromyalgia     Insomnia     Neck pain     Panic attacks     Shoulder pain     TMJ syndrome      Past surgical history:  Past Surgical History:   Procedure Laterality Date    HARDWARE REMOVAL ORTHO Left 6/18/2019    Procedure: HARDWARE REMOVAL ORTHO - FIBULA;  Surgeon: Jose Ruiz M.D.;  Location: [facility];  Service: Orthopedics    ANKLE ARTHROSCOPY Right 12/18/2018    Procedure: ANKLE ARTHROSCOPY;  Surgeon: Jose Ruiz M.D.;  Location: [facility];  Service: Orthopedics    LIGAMENT REPAIR Right 12/18/2018    Procedure: LIGAMENT REPAIR- ANTERIOR TALOFIBULAR/ CALCANEOFIBULAR, POSTERIOR TIBIALIS REPAIR;  Surgeon: Jose Ruiz M.D.;  Location: SURGERY [facility];  Service: Orthopedics    IRRIGATION & DEBRIDEMENT ORTHO Right 12/18/2018    Procedure: IRRIGATION & DEBRIDEMENT ORTHO-POSTERIOR TIBIALIS DEBRIDEMENT;  Surgeon: Jose Ruiz M.D.;  Location: [facility];  Service: Orthopedics    ORIF, ANKLE Left 9/2/2018    " "Procedure: ANKLE ORIF;  Surgeon: Dayday Jaramillo M.D.;  Location: SURGERY [facility];  Service: Orthopedics    BLADDER SUSPENSION  6/27/2018    Procedure: BLADDER SUSPENSION-   FOR: TRANS OBTURATOR TAPE;  Surgeon: Chapin Banks M.D.;  Location: SURGERY SAME DAY [facility];  Service: Gynecology    OTHER      neck abscess    TONSILLECTOMY      TUBAL LIGATION       Medications:has a current medication list which includes the following prescription(s): estradiol, phentermine, trazodone, effexor xr, metformin, hydroxyzine hcl, probiotic product, nitrofurantoin, fluconazole, albuterol, fluticasone-salmeterol, and gabapentin.  Allergies:Latex, Sulfa drugs, Codeine, and Morphine  Family history:  Family History   Problem Relation Age of Onset    Diabetes Other     Allergies Other         RA    Other Other         DIVERTICULITIS, LUPUS, DDD, FM     Social history: reports that she quit smoking about 3 years ago. Her smoking use included cigarettes. She has a 9 pack-year smoking history. She has never used smokeless tobacco. She reports that she does not drink alcohol and does not use drugs.    Review of systems: A full review of systems was performed, and negative with the exception of want is noted above in the HPI.        Objective        /79 (BP Location: Left arm, Patient Position: Sitting, BP Cuff Size: Adult)   Pulse 77   Ht 5' 7\"   Wt 176 lb   LMP 05/12/2025 (Exact Date)   BMI 27.57 kg/m²     Physical Exam  Vitals reviewed. Exam conducted with a chaperone present (MA - see notes.).   Constitutional:       Appearance: Normal appearance.   HENT:      Head: Normocephalic.      Mouth/Throat:      Mouth: Mucous membranes are moist.   Cardiovascular:      Rate and Rhythm: Normal rate.   Pulmonary:      Effort: Pulmonary effort is normal.   Abdominal:      Palpations: Abdomen is soft. There is no mass.      Tenderness: There is no abdominal tenderness.   Skin:     General: Skin is warm and dry. "   Neurological:      Mental Status: She is alert.   Psychiatric:         Mood and Affect: Mood normal.         Genitourinary:    Vulva: WNL   Bulbocavernosus reflex: Intact   Anal wink reflex: Intact   Perineal sensation: WNL   Urethra: Atrophic, 2 cm blue mesh exposure noted on the patient's right-hand side, truncated, overall healthy surrounding tissue.  Possible mesh exposure palpated on the left, smaller, closer to the obturator internus   Vagina: Atrophic   Atrophy: Mild   Cough stress test: Negative    Pelvic floor:    No significant prolapse    Levator muscle tone: WNL   Pelvic floor contraction strength (modified Oxford scale): 3=Moderate   Pelvic floor contraction duration: Brief    Bimanual exam: Small, Mobile Uterus    Procedure Performed: No    Diagnostic test and records review:      Post-void residual: 45 mL, performed by Bladder Scanner    Labs: n/a    Radiology:     Pelvic US 6/6/25:   UTERUS:  The uterus measures 4.48 cm x 7.72 cm x 5.00 cm.  The uterine myometrium contains a 1.6 cm heterogeneous fibroid posteriorly into the left  The endometrial echo complex measures 1.26 cm.  The endometrium is unremarkable in appearance and thickness for age and menstrual status.  OVARIES:  The right ovary measures 3.34 cm x 1.59 cm x 1.69 cm. Duplex Doppler examination of the right ovary shows normal waveforms.  The left ovary measures 2.24 cm x 1.65 cm x 1.92 cm. Duplex Doppler examination of the left ovary shows normal waveforms. There is a prominent fluid-filled left fallopian tube.  Incidental normal-appearing appendix visualized  There is no free fluid seen.  IMPRESSION:  1.  1.6 cm uterine fibroid.  2.  Prominent fluid-filled left Fallopian tube.  3.  Incidental normal-appearing appendix visualized.    Procedural: n/a    Documentation reviewed: Prior EMR Records             Assessment & Plan     Florencia Lau is a 45 y.o. P1 with transobturator sling mesh exposure, frequent UTI, stress  incontinence. We discussed my recommendations for further diagnosis and treatment at length today.     1. Erosion of transobturator mid-urethral sling  She has a significant sling mesh exposure despite prior excision in the office.  This is likely causing her vaginal discharge as well as recurrent UTIs I recommend excision in the OR with full excision of the vaginal portion of the mass bilaterally.  This procedure was described to her in detail including the recovery and what to expect afterwards.  Due to her stress incontinence, see below, I would recommend urethral bulking to try to treat this without an implanted mesh at the same time.  -Schedule for: Excision of exposed transobturator sling mesh, urethral bulking, and all indicated procedures.    2. Recurrent UTI  Most likely cause is mesh exposure seeding recurrent infections.  This is likely to improve significantly after mesh removal, however I still recommend vaginal estrogen in order to help optimize the microbiome.  Counseled on calling us when she has symptoms of UTI in order to drop off urine culture, so we can manage appropriately.  Standing culture ordered.  -Continue vaginal estrogen  2x weekly until at least 3 months after sling excision, then reevaluate    3. Stress incontinence  She previous had significant stress incontinence which went away with the sling, now has recurred after the sling has been cut.  She denies any urinary urgency, and empties her bladder well.  She is not a good candidate for mesh reimplantation due to her exposure at this time, so I recommend urethral bulking.  Success rates reviewed with her in detail, and she understands she may require second treatment or other treatments down the line.    4. Vaginal atrophy  Continue vaginal estrogen twice weekly               Ortiz Victor MD, FACOG  Urogynecology and Reconstructive Pelvic Surgery  Department of Obstetrics and Gynecology  [facility]  [facility]        This medical record contains text that has been entered with the assistance of computer voice recognition and dictation software.  Therefore, it may contain unintended errors in text, spelling, punctuation, or grammar

## 2025-06-09 NOTE — PROGRESS NOTES
PT here today for consult   Ref for mesh erosion   Hysterectomy? X  UTI Symtoms? X  Good #:  [phone]   PVR : 45  mL   Pharmacy Verified

## 2025-06-17 ENCOUNTER — PATIENT MESSAGE (OUTPATIENT)
Dept: GYNECOLOGY | Facility: CLINIC | Age: 45
End: 2025-06-17
Payer: COMMERCIAL

## 2025-06-17 RX ORDER — FLUCONAZOLE 150 MG/1
150 TABLET ORAL ONCE
Qty: 1 TABLET | Refills: 0 | Status: SHIPPED | OUTPATIENT
Start: 2025-06-17 | End: 2025-06-17

## 2025-06-25 NOTE — PROGRESS NOTES
Urogynecology & Reconstructive Pelvic Surgery    Florencia Lau MRN:[id number] :1980    Referred by: Zuri Roberts MD    Reason for Visit:   Chief Complaint   Patient presents with    Follow-Up     Virtual      This evaluation was conducted via Teams using secure and encrypted videoconferencing technology. The patient was in a private location at her home in the [facility].    The patient's identity was confirmed and verbal consent was obtained for this virtual visit.        Subjective     History of Presenting Illness:    Florencia Lau is a 45 y.o. P1 with sling mesh exposure presents for follow-up    She has had a chance to review all preoperativequestions.  She still desires removal of her exposed mesh.    Initial HPI: She presents for the evaluation and management of mesh exposure.     She had a sling placed in 2018 for stress incontinence, noted is large volume leaks with most activities.  She had an overall unremarkable pain recovery and significant proved in her stress incontinence symptoms, but noted transient nerve weakness in her right leg afterwards which resolved with time.    Over the last year or so she had noticed more bleeding after intercourse, recurrent BV/yeast, vaginal discharge which did not respond to antibiotic therapy alone.  She saw her referring GYN who noted a significant sling exposure, and this was partially excised in the office.  She continues to have some vaginal discharge and notices a new mesh protrusion today.  She was seen in the ER last week due to her concern about the exposure.  He has started to have stress incontinence again after the mesh was cut, no urgency    She was started on vaginal estrogen, but stopped this after she felt the sling exposed again out of fear of seeding and infection, although I encouraged her to continue    She has a medical history notable for fibroimyalgia.  She is a former smoker, quit a few years  "ago.    GYN care managed by Dr. Gail Arrington.     Prior Pelvic surgery:   Tubal ligation - hydrosalpinx noted on prior imaging  2018 transobturator sling ([place]chle[place]z) - op report reviewed.      Prior treatment:   Partial sling excision  Pelvic PT  - 2018      Pelvic floor symptom review:     Bladder:   Voids per day: 3-4 Voids per night: 1-2      Urinary incontinence episodes per day: depends on activity    Urge leakage:  None   Stress leakage: With Cough, With Laugh, and With Exercise   Continuous / insensible urine loss: No    Nocturnal enuresis: No    Leakage volume: Moderate   Bladder emptying: most of the time   Voiding symptoms: Hesitancy   UTI in last 12 months: yes   Other urologic history: none      Prolapse:     Prolapse symptoms: None      Bowel:    Lifelong constipation, no stool leaks      Sexual function:    Sexually active: Yes - not currently but would like to   Gender of partners: Male   Pain with intercourse: No           Past medical and surgical history      Past medical history:  Past Medical History:   Diagnosis Date    Hayfever 06/12/2018    Muscle spasm 06/12/2018    \"My neurologist told me I have a muscle spasm disorder\"    Indigestion 06/12/2018    Bowel habit changes 06/12/2018    \"Constipation/Diarrhea\"    Anxiety and depression 06/12/2018    Anxiety     Back pain     Bronchitis     HX OF    Depression     Dysfunctional uterine bleeding     Fibromyalgia     Insomnia     Neck pain     Panic attacks     Shoulder pain     TMJ syndrome      Past surgical history:  Past Surgical History:   Procedure Laterality Date    HARDWARE REMOVAL ORTHO Left 6/18/2019    Procedure: HARDWARE REMOVAL ORTHO - FIBULA;  Surgeon: Jose Ruiz M.D.;  Location: SURGERY [facility];  Service: Orthopedics    ANKLE ARTHROSCOPY Right 12/18/2018    Procedure: ANKLE ARTHROSCOPY;  Surgeon: Jose Ruiz M.D.;  Location: [facility];  Service: Orthopedics    LIGAMENT REPAIR Right 12/18/2018    " Procedure: LIGAMENT REPAIR- ANTERIOR TALOFIBULAR/ CALCANEOFIBULAR, POSTERIOR TIBIALIS REPAIR;  Surgeon: Jose Ruiz M.D.;  Location: SURGERY [facility];  Service: Orthopedics    IRRIGATION & DEBRIDEMENT ORTHO Right 12/18/2018    Procedure: IRRIGATION & DEBRIDEMENT ORTHO-POSTERIOR TIBIALIS DEBRIDEMENT;  Surgeon: Jose Ruiz M.D.;  Location: SURGERY [facility];  Service: Orthopedics    ORIF, ANKLE Left 9/2/2018    Procedure: ANKLE ORIF;  Surgeon: Dayday Jaramillo M.D.;  Location: SURGERY [facility];  Service: Orthopedics    BLADDER SUSPENSION  6/27/2018    Procedure: BLADDER SUSPENSION-   FOR: TRANS OBTURATOR TAPE;  Surgeon: Chapin Banks M.D.;  Location: SURGERY SAME DAY [facility];  Service: Gynecology    OTHER      neck abscess    TONSILLECTOMY      TUBAL LIGATION       Medications:has a current medication list which includes the following prescription(s): estradiol, phentermine, trazodone, effexor xr, metformin, hydroxyzine hcl, probiotic product, nitrofurantoin, fluconazole, albuterol, fluticasone-salmeterol, and gabapentin.  Allergies:Latex, Sulfa drugs, Codeine, and Morphine  Family history:  Family History   Problem Relation Age of Onset    Diabetes Other     Allergies Other         RA    Other Other         DIVERTICULITIS, LUPUS, DDD, FM     Social history: reports that she quit smoking about 3 years ago. Her smoking use included cigarettes. She has a 9 pack-year smoking history. She has never used smokeless tobacco. She reports that she does not drink alcohol and does not use drugs.    Review of systems: A full review of systems was performed, and negative with the exception of want is noted above in the HPI.        Objective        LMP 05/12/2025 (Exact Date)     Physical Exam  Vitals reviewed. Exam conducted with a chaperone present (MA - see notes.).   Constitutional:       Appearance: Normal appearance.   HENT:      Head: Normocephalic.      Mouth/Throat:      Mouth:  Mucous membranes are moist.   Cardiovascular:      Rate and Rhythm: Normal rate.   Pulmonary:      Effort: Pulmonary effort is normal.   Abdominal:      Palpations: Abdomen is soft. There is no mass.      Tenderness: There is no abdominal tenderness.   Skin:     General: Skin is warm and dry.   Neurological:      Mental Status: She is alert.   Psychiatric:         Mood and Affect: Mood normal.         Genitourinary:    Vulva: WNL   Bulbocavernosus reflex: Intact   Anal wink reflex: Intact   Perineal sensation: WNL   Urethra: Atrophic, 2 cm blue mesh exposure noted on the patient's right-hand side, truncated, overall healthy surrounding tissue.  Possible mesh exposure palpated on the left, smaller, closer to the obturator internus   Vagina: Atrophic   Atrophy: Mild   Cough stress test: Negative    Pelvic floor:    No significant prolapse    Levator muscle tone: WNL   Pelvic floor contraction strength (modified Oxford scale): 3=Moderate   Pelvic floor contraction duration: Brief    Bimanual exam: Small, Mobile Uterus    Procedure Performed: No    Diagnostic test and records review:      Post-void residual: 45 mL, performed by Bladder Scanner    Labs: n/a    Radiology:     Pelvic US 6/6/25:   UTERUS:  The uterus measures 4.48 cm x 7.72 cm x 5.00 cm.  The uterine myometrium contains a 1.6 cm heterogeneous fibroid posteriorly into the left  The endometrial echo complex measures 1.26 cm.  The endometrium is unremarkable in appearance and thickness for age and menstrual status.  OVARIES:  The right ovary measures 3.34 cm x 1.59 cm x 1.69 cm. Duplex Doppler examination of the right ovary shows normal waveforms.  The left ovary measures 2.24 cm x 1.65 cm x 1.92 cm. Duplex Doppler examination of the left ovary shows normal waveforms. There is a prominent fluid-filled left fallopian tube.  Incidental normal-appearing appendix visualized  There is no free fluid seen.  IMPRESSION:  1.  1.6 cm uterine fibroid.  2.  Prominent  fluid-filled left Fallopian tube.  3.  Incidental normal-appearing appendix visualized.    Procedural: n/a    Documentation reviewed: Prior EMR Records             Assessment & Plan     Florencia Lau is a 45 y.o. P1 with transobturator sling mesh exposure, frequent UTI, stress incontinence.     1. Erosion of transobturator mid-urethral sling  She has a significant sling mesh exposure despite prior excision in the office.  This is likely causing her vaginal discharge as well as recurrent UTIs I recommend excision in the OR with full excision of the vaginal portion of the mass bilaterally.  This procedure was described to her in detail including the recovery and what to expect afterwards.  Due to her stress incontinence, see below, I would recommend urethral bulking to try to treat this without an implanted mesh at the same time.  -Schedule for: Excision of exposed transobturator sling mesh, urethral bulking, and all indicated procedures.    Benefits of surgery were reviewed, including functional outcomes (bladder/bowel/sexual). Risks of surgery were  also discussed including anesthesia, bleeding, infection, damage to surrounding organs (bladder, ureter, urethra, bowel, blood vessel, nerves), possible blood transfusion (rare), persistent/recurrent incontinence, recurrent mesh exposure/erosion, pain, dyspareunia, transient voiding dysfunction requiring catheterization, possible need for sling release/revision, new/worsening urinary incontinence.    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 She will likely go home on the same day as the surgery. She was counseled on what to expect in the recovery room including voiding trial, as well as what to expect if voiding trial is unsuccessful, which would be transient catheterization.   Discussed trajectory of recovery, including maximizing NSAIDs and  Tylenol, and that narcotics are not routinely given.  Discussed restrictions including heavy lifting that requires straining, driving while on narcotics, nothing in the vagina and no bathing/swimming for at least 6 weeks until evaluated in the office.  Return to work discussed.  *Please see the corresponding after visit summary counseling packet for detailed counseling provided for the patient.   Pre-op meds: acetaminophen 1000mg PO, phenazopyridine 200mg PO, Cefazolin 2gm IV   Post-op medication plan: ibuprofen, tylenol, gabapentin  Home medication review: She should additionally hold all NSAIDs and supplements for 1 week prior to surgery.  Stop phentermine 1 week prior to surgery.       2. Recurrent UTI  Most likely cause is mesh exposure seeding recurrent infections.  This is likely to improve significantly after mesh removal, however I still recommend vaginal estrogen in order to help optimize the microbiome.  Counseled on calling us when she has symptoms of UTI in order to drop off urine culture, so we can manage appropriately.  Standing culture ordered.  - Continue vaginal estrogen  2x weekly until at least 3 months after sling excision, then reevaluate  - Some UTI symptoms today, will leave a culture, treat based on results.     3. Stress incontinence  She previous had significant stress incontinence which went away with the sling, now has recurred after the sling has been cut.  She denies any urinary urgency, and empties her bladder well.  She is not a good candidate for mesh reimplantation due to her exposure at this time, so I recommend urethral bulking.  Success rates reviewed with her in detail, and she understands she may require second treatment or other treatments down the line.    4. Vaginal atrophy  Continue vaginal estrogen twice weekly               Ortiz Victor MD, FACOG  Urogynecology and Reconstructive Pelvic Surgery  Department of Obstetrics and Gynecology  [facility]  [facility]        This medical record contains text that has been entered with the assistance of computer voice recognition and dictation software.  Therefore, it may contain unintended errors in text, spelling, punctuation, or grammar

## 2025-06-26 ENCOUNTER — TELEMEDICINE (OUTPATIENT)
Dept: GYNECOLOGY | Facility: CLINIC | Age: 45
End: 2025-06-26
Payer: COMMERCIAL

## 2025-06-26 DIAGNOSIS — N39.0 RECURRENT UTI: ICD-10-CM

## 2025-06-26 DIAGNOSIS — T83.712S: Primary | ICD-10-CM

## 2025-06-26 DIAGNOSIS — N95.2 VAGINAL ATROPHY: ICD-10-CM

## 2025-06-26 DIAGNOSIS — N39.3 STRESS INCONTINENCE: ICD-10-CM

## 2025-07-10 ENCOUNTER — APPOINTMENT (OUTPATIENT)
Dept: ADMISSIONS | Facility: MEDICAL CENTER | Age: 45
End: 2025-07-10
Attending: STUDENT IN AN ORGANIZED HEALTH CARE EDUCATION/TRAINING PROGRAM
Payer: COMMERCIAL

## 2025-07-16 ENCOUNTER — TELEPHONE (OUTPATIENT)
Dept: OBGYN | Facility: CLINIC | Age: 45
End: 2025-07-16
Payer: COMMERCIAL

## 2025-07-16 ENCOUNTER — PRE-ADMISSION TESTING (OUTPATIENT)
Dept: ADMISSIONS | Facility: MEDICAL CENTER | Age: 45
End: 2025-07-16
Attending: STUDENT IN AN ORGANIZED HEALTH CARE EDUCATION/TRAINING PROGRAM
Payer: COMMERCIAL

## 2025-07-16 VITALS — HEIGHT: 67 IN | BODY MASS INDEX: 27.57 KG/M2

## 2025-07-16 DIAGNOSIS — Z01.812 PRE-OPERATIVE LABORATORY EXAMINATION: Primary | ICD-10-CM

## 2025-07-16 RX ORDER — GABAPENTIN 300 MG/1
300 CAPSULE ORAL 3 TIMES DAILY
COMMUNITY

## 2025-07-16 RX ORDER — ESTRADIOL 0.1 MG/G
CREAM VAGINAL PRN
COMMUNITY

## 2025-07-16 RX ORDER — PHENTERMINE HYDROCHLORIDE 37.5 MG/1
37.5 CAPSULE ORAL EVERY MORNING
COMMUNITY

## 2025-07-16 NOTE — TELEPHONE ENCOUNTER
Nurse from [facility] called stating pt is having surgery with Dr. Victor and he told the pt to stop taking medication phentermine 37.5 MG capsule for 7 days but the nurse says that anesthesiologist protocol calls for the pt to stop taking it for 10 days. Pt stopped taking medication 7/12 per nurse. She just wanted to let him and the MA know. Message sent to nick.

## 2025-07-16 NOTE — PREPROCEDURE INSTRUCTIONS
PreAdmit Telephone Appointment: Reviewed the Preparing for your procedure handout with patient over the phone. Patient instructed per pharmacy guidelines regarding taking, holding or contacting provider for instructions on regularly prescribed medications before surgery.    Confirmed with patient where to check in morning of surgery. Handouts/Brochure/Video emailed to patient.    Pt stopped phentermine 9 days prior to surgery.  She was instructed by Dr. Victor's office to stop one week prior.  Called Dr Victor's office, spoke with Merlene at office, she will relay message to MA regarding phentermine

## 2025-07-16 NOTE — PREADMIT AVS NOTE
Current Medications   Medication Instructions    estradiol (ESTRACE) 0.1 MG/GM vaginal cream Hold medication day of procedure    gabapentin (NEURONTIN) 300 MG Cap Continue taking as prescribed.    phentermine 37.5 MG capsule Stop 10 days before surgery    traZODone (DESYREL) 50 MG Tab Continue taking as prescribed.    EFFEXOR XR 37.5 MG CAPSULE SR 24 HR Continue taking medication as prescribed, including morning of procedure     metFORMIN (GLUCOPHAGE) 500 MG Tab Hold medication day of procedure    hydrOXYzine HCl (ATARAX) 50 MG Tab Continue taking as prescribed.    Probiotic Product (PRO-BIOTIC BLEND PO) Hold medication day of procedure

## 2025-07-17 ENCOUNTER — APPOINTMENT (OUTPATIENT)
Dept: ADMISSIONS | Facility: MEDICAL CENTER | Age: 45
End: 2025-07-17
Attending: STUDENT IN AN ORGANIZED HEALTH CARE EDUCATION/TRAINING PROGRAM
Payer: COMMERCIAL

## 2025-07-17 DIAGNOSIS — Z01.812 PRE-OPERATIVE LABORATORY EXAMINATION: ICD-10-CM

## 2025-07-18 ENCOUNTER — ANESTHESIA EVENT (OUTPATIENT)
Facility: MEDICAL CENTER | Age: 45
End: 2025-07-18
Payer: COMMERCIAL

## 2025-07-18 ENCOUNTER — HOSPITAL ENCOUNTER (OUTPATIENT)
Dept: LAB | Facility: MEDICAL CENTER | Age: 45
End: 2025-07-18
Attending: STUDENT IN AN ORGANIZED HEALTH CARE EDUCATION/TRAINING PROGRAM
Payer: COMMERCIAL

## 2025-07-18 LAB
ANION GAP SERPL CALC-SCNC: 12 MMOL/L (ref 7–16)
BUN SERPL-MCNC: 12 MG/DL (ref 8–22)
CALCIUM SERPL-MCNC: 9 MG/DL (ref 8.5–10.5)
CHLORIDE SERPL-SCNC: 106 MMOL/L (ref 96–112)
CO2 SERPL-SCNC: 20 MMOL/L (ref 20–33)
CREAT SERPL-MCNC: 0.69 MG/DL (ref 0.5–1.4)
EST. AVERAGE GLUCOSE BLD GHB EST-MCNC: 117 MG/DL
GFR SERPLBLD CREATININE-BSD FMLA CKD-EPI: 109 ML/MIN/1.73 M 2
GLUCOSE SERPL-MCNC: 123 MG/DL (ref 65–99)
HBA1C MFR BLD: 5.7 % (ref 4–5.6)
POTASSIUM SERPL-SCNC: 3.7 MMOL/L (ref 3.6–5.5)
SODIUM SERPL-SCNC: 138 MMOL/L (ref 135–145)

## 2025-07-18 PROCEDURE — 80048 BASIC METABOLIC PNL TOTAL CA: CPT

## 2025-07-18 PROCEDURE — 83036 HEMOGLOBIN GLYCOSYLATED A1C: CPT

## 2025-07-18 PROCEDURE — 36415 COLL VENOUS BLD VENIPUNCTURE: CPT

## 2025-07-18 PROCEDURE — 87086 URINE CULTURE/COLONY COUNT: CPT

## 2025-07-20 LAB
BACTERIA UR CULT: NORMAL
SIGNIFICANT IND 70042: NORMAL
SITE SITE: NORMAL
SOURCE SOURCE: NORMAL

## 2025-07-21 ENCOUNTER — HOSPITAL ENCOUNTER (OUTPATIENT)
Facility: MEDICAL CENTER | Age: 45
End: 2025-07-21
Attending: STUDENT IN AN ORGANIZED HEALTH CARE EDUCATION/TRAINING PROGRAM | Admitting: STUDENT IN AN ORGANIZED HEALTH CARE EDUCATION/TRAINING PROGRAM
Payer: COMMERCIAL

## 2025-07-21 ENCOUNTER — ANESTHESIA (OUTPATIENT)
Facility: MEDICAL CENTER | Age: 45
End: 2025-07-21
Payer: COMMERCIAL

## 2025-07-21 ENCOUNTER — PHARMACY VISIT (OUTPATIENT)
Dept: PHARMACY | Facility: MEDICAL CENTER | Age: 45
End: 2025-07-21
Payer: COMMERCIAL

## 2025-07-21 VITALS
TEMPERATURE: 96.8 F | SYSTOLIC BLOOD PRESSURE: 130 MMHG | DIASTOLIC BLOOD PRESSURE: 72 MMHG | HEART RATE: 64 BPM | OXYGEN SATURATION: 96 % | BODY MASS INDEX: 27.58 KG/M2 | HEIGHT: 67 IN | WEIGHT: 175.71 LBS | RESPIRATION RATE: 14 BRPM

## 2025-07-21 DIAGNOSIS — G89.18 POST-OP PAIN: Primary | ICD-10-CM

## 2025-07-21 LAB — HCG UR QL: NEGATIVE

## 2025-07-21 PROCEDURE — 160193 HCHG PACU STANDARD - 1ST 60 MINS: Performed by: STUDENT IN AN ORGANIZED HEALTH CARE EDUCATION/TRAINING PROGRAM

## 2025-07-21 PROCEDURE — 160028 HCHG SURGERY MINUTES - 1ST 30 MINS LEVEL 3: Performed by: STUDENT IN AN ORGANIZED HEALTH CARE EDUCATION/TRAINING PROGRAM

## 2025-07-21 PROCEDURE — 160025 RECOVERY II MINUTES (STATS): Performed by: STUDENT IN AN ORGANIZED HEALTH CARE EDUCATION/TRAINING PROGRAM

## 2025-07-21 PROCEDURE — A9270 NON-COVERED ITEM OR SERVICE: HCPCS | Performed by: STUDENT IN AN ORGANIZED HEALTH CARE EDUCATION/TRAINING PROGRAM

## 2025-07-21 PROCEDURE — 700102 HCHG RX REV CODE 250 W/ 637 OVERRIDE(OP): Performed by: STUDENT IN AN ORGANIZED HEALTH CARE EDUCATION/TRAINING PROGRAM

## 2025-07-21 PROCEDURE — 700111 HCHG RX REV CODE 636 W/ 250 OVERRIDE (IP)

## 2025-07-21 PROCEDURE — 700105 HCHG RX REV CODE 258: Performed by: STUDENT IN AN ORGANIZED HEALTH CARE EDUCATION/TRAINING PROGRAM

## 2025-07-21 PROCEDURE — L8606 SYNTHETIC IMPLNT URINARY 1ML: HCPCS | Performed by: STUDENT IN AN ORGANIZED HEALTH CARE EDUCATION/TRAINING PROGRAM

## 2025-07-21 PROCEDURE — RXMED WILLOW AMBULATORY MEDICATION CHARGE

## 2025-07-21 PROCEDURE — 160048 HCHG OR STATISTICAL LEVEL 1-5: Performed by: STUDENT IN AN ORGANIZED HEALTH CARE EDUCATION/TRAINING PROGRAM

## 2025-07-21 PROCEDURE — 700101 HCHG RX REV CODE 250: Performed by: STUDENT IN AN ORGANIZED HEALTH CARE EDUCATION/TRAINING PROGRAM

## 2025-07-21 PROCEDURE — 160191 HCHG ANESTHESIA STANDARD: Performed by: STUDENT IN AN ORGANIZED HEALTH CARE EDUCATION/TRAINING PROGRAM

## 2025-07-21 PROCEDURE — 160015 HCHG STAT PREOP MINUTES: Performed by: STUDENT IN AN ORGANIZED HEALTH CARE EDUCATION/TRAINING PROGRAM

## 2025-07-21 PROCEDURE — 51715 ENDOSCOPIC INJECTION/IMPLANT: CPT | Performed by: STUDENT IN AN ORGANIZED HEALTH CARE EDUCATION/TRAINING PROGRAM

## 2025-07-21 PROCEDURE — 160039 HCHG SURGERY MINUTES - EA ADDL 1 MIN LEVEL 3: Performed by: STUDENT IN AN ORGANIZED HEALTH CARE EDUCATION/TRAINING PROGRAM

## 2025-07-21 PROCEDURE — 700111 HCHG RX REV CODE 636 W/ 250 OVERRIDE (IP): Performed by: STUDENT IN AN ORGANIZED HEALTH CARE EDUCATION/TRAINING PROGRAM

## 2025-07-21 PROCEDURE — 160002 HCHG RECOVERY MINUTES (STAT): Performed by: STUDENT IN AN ORGANIZED HEALTH CARE EDUCATION/TRAINING PROGRAM

## 2025-07-21 PROCEDURE — 57287 REVISE/REMOVE SLING REPAIR: CPT | Performed by: STUDENT IN AN ORGANIZED HEALTH CARE EDUCATION/TRAINING PROGRAM

## 2025-07-21 PROCEDURE — 110371 HCHG SHELL REV 272: Performed by: STUDENT IN AN ORGANIZED HEALTH CARE EDUCATION/TRAINING PROGRAM

## 2025-07-21 PROCEDURE — 160046 HCHG PACU - 1ST 60 MINS PHASE II: Performed by: STUDENT IN AN ORGANIZED HEALTH CARE EDUCATION/TRAINING PROGRAM

## 2025-07-21 PROCEDURE — 700111 HCHG RX REV CODE 636 W/ 250 OVERRIDE (IP): Mod: JZ | Performed by: STUDENT IN AN ORGANIZED HEALTH CARE EDUCATION/TRAINING PROGRAM

## 2025-07-21 PROCEDURE — 84703 CHORIONIC GONADOTROPIN ASSAY: CPT | Mod: QW | Performed by: STUDENT IN AN ORGANIZED HEALTH CARE EDUCATION/TRAINING PROGRAM

## 2025-07-21 DEVICE — SYSTEM KIT BULKAMID URETHRAL BULKING (1/EA) ***MUST ORDER A MIN OF 5 EA***: Type: IMPLANTABLE DEVICE | Site: URETHRA | Status: FUNCTIONAL

## 2025-07-21 RX ORDER — DEXAMETHASONE SODIUM PHOSPHATE 4 MG/ML
INJECTION, SOLUTION INTRA-ARTICULAR; INTRALESIONAL; INTRAMUSCULAR; INTRAVENOUS; SOFT TISSUE PRN
Status: DISCONTINUED | OUTPATIENT
Start: 2025-07-21 | End: 2025-07-21 | Stop reason: SURG

## 2025-07-21 RX ORDER — SCOPOLAMINE 1 MG/3D
1 PATCH, EXTENDED RELEASE TRANSDERMAL
Status: DISCONTINUED | OUTPATIENT
Start: 2025-07-21 | End: 2025-07-21 | Stop reason: HOSPADM

## 2025-07-21 RX ORDER — HALOPERIDOL 5 MG/ML
1 INJECTION INTRAMUSCULAR
Status: DISCONTINUED | OUTPATIENT
Start: 2025-07-21 | End: 2025-07-21 | Stop reason: HOSPADM

## 2025-07-21 RX ORDER — SODIUM CHLORIDE, SODIUM LACTATE, POTASSIUM CHLORIDE, CALCIUM CHLORIDE 600; 310; 30; 20 MG/100ML; MG/100ML; MG/100ML; MG/100ML
INJECTION, SOLUTION INTRAVENOUS CONTINUOUS
Status: ACTIVE | OUTPATIENT
Start: 2025-07-21 | End: 2025-07-21

## 2025-07-21 RX ORDER — ONDANSETRON 2 MG/ML
4 INJECTION INTRAMUSCULAR; INTRAVENOUS
Status: DISCONTINUED | OUTPATIENT
Start: 2025-07-21 | End: 2025-07-21 | Stop reason: HOSPADM

## 2025-07-21 RX ORDER — CEFAZOLIN SODIUM 1 G/3ML
INJECTION, POWDER, FOR SOLUTION INTRAMUSCULAR; INTRAVENOUS PRN
Status: DISCONTINUED | OUTPATIENT
Start: 2025-07-21 | End: 2025-07-21 | Stop reason: SURG

## 2025-07-21 RX ORDER — ALBUTEROL SULFATE 5 MG/ML
2.5 SOLUTION RESPIRATORY (INHALATION)
Status: DISCONTINUED | OUTPATIENT
Start: 2025-07-21 | End: 2025-07-21 | Stop reason: HOSPADM

## 2025-07-21 RX ORDER — MIDAZOLAM HYDROCHLORIDE 1 MG/ML
INJECTION INTRAMUSCULAR; INTRAVENOUS PRN
Status: DISCONTINUED | OUTPATIENT
Start: 2025-07-21 | End: 2025-07-21 | Stop reason: SURG

## 2025-07-21 RX ORDER — HYDRALAZINE HYDROCHLORIDE 20 MG/ML
5 INJECTION INTRAMUSCULAR; INTRAVENOUS
Status: DISCONTINUED | OUTPATIENT
Start: 2025-07-21 | End: 2025-07-21 | Stop reason: HOSPADM

## 2025-07-21 RX ORDER — ONDANSETRON 2 MG/ML
INJECTION INTRAMUSCULAR; INTRAVENOUS PRN
Status: DISCONTINUED | OUTPATIENT
Start: 2025-07-21 | End: 2025-07-21 | Stop reason: SURG

## 2025-07-21 RX ORDER — LABETALOL HYDROCHLORIDE 5 MG/ML
5 INJECTION, SOLUTION INTRAVENOUS
Status: DISCONTINUED | OUTPATIENT
Start: 2025-07-21 | End: 2025-07-21 | Stop reason: HOSPADM

## 2025-07-21 RX ORDER — GABAPENTIN 100 MG/1
200 CAPSULE ORAL
Status: COMPLETED | OUTPATIENT
Start: 2025-07-21 | End: 2025-07-21

## 2025-07-21 RX ORDER — SODIUM CHLORIDE, SODIUM LACTATE, POTASSIUM CHLORIDE, CALCIUM CHLORIDE 600; 310; 30; 20 MG/100ML; MG/100ML; MG/100ML; MG/100ML
INJECTION, SOLUTION INTRAVENOUS
Status: DISCONTINUED | OUTPATIENT
Start: 2025-07-21 | End: 2025-07-21 | Stop reason: SURG

## 2025-07-21 RX ORDER — KETOROLAC TROMETHAMINE 15 MG/ML
INJECTION, SOLUTION INTRAMUSCULAR; INTRAVENOUS PRN
Status: DISCONTINUED | OUTPATIENT
Start: 2025-07-21 | End: 2025-07-21 | Stop reason: SURG

## 2025-07-21 RX ORDER — ACETAMINOPHEN 500 MG
1000 TABLET ORAL ONCE
Status: COMPLETED | OUTPATIENT
Start: 2025-07-21 | End: 2025-07-21

## 2025-07-21 RX ORDER — LIDOCAINE HYDROCHLORIDE 20 MG/ML
INJECTION, SOLUTION EPIDURAL; INFILTRATION; INTRACAUDAL; PERINEURAL PRN
Status: DISCONTINUED | OUTPATIENT
Start: 2025-07-21 | End: 2025-07-21 | Stop reason: SURG

## 2025-07-21 RX ORDER — SODIUM CHLORIDE, SODIUM LACTATE, POTASSIUM CHLORIDE, CALCIUM CHLORIDE 600; 310; 30; 20 MG/100ML; MG/100ML; MG/100ML; MG/100ML
INJECTION, SOLUTION INTRAVENOUS CONTINUOUS
Status: DISCONTINUED | OUTPATIENT
Start: 2025-07-21 | End: 2025-07-21 | Stop reason: HOSPADM

## 2025-07-21 RX ORDER — HYDROMORPHONE HYDROCHLORIDE 1 MG/ML
0.2 INJECTION, SOLUTION INTRAMUSCULAR; INTRAVENOUS; SUBCUTANEOUS
Status: DISCONTINUED | OUTPATIENT
Start: 2025-07-21 | End: 2025-07-21 | Stop reason: HOSPADM

## 2025-07-21 RX ORDER — PHENAZOPYRIDINE HYDROCHLORIDE 200 MG/1
200 TABLET, FILM COATED ORAL
Status: COMPLETED | OUTPATIENT
Start: 2025-07-21 | End: 2025-07-21

## 2025-07-21 RX ORDER — MEPERIDINE HYDROCHLORIDE 25 MG/ML
6.25 INJECTION INTRAMUSCULAR; INTRAVENOUS; SUBCUTANEOUS
Status: DISCONTINUED | OUTPATIENT
Start: 2025-07-21 | End: 2025-07-21 | Stop reason: HOSPADM

## 2025-07-21 RX ORDER — OXYCODONE HYDROCHLORIDE 5 MG/1
5 TABLET ORAL EVERY 4 HOURS PRN
Qty: 6 TABLET | Refills: 0 | Status: SHIPPED | OUTPATIENT
Start: 2025-07-21 | End: 2025-07-28

## 2025-07-21 RX ORDER — HYDROMORPHONE HYDROCHLORIDE 1 MG/ML
0.4 INJECTION, SOLUTION INTRAMUSCULAR; INTRAVENOUS; SUBCUTANEOUS
Status: DISCONTINUED | OUTPATIENT
Start: 2025-07-21 | End: 2025-07-21 | Stop reason: HOSPADM

## 2025-07-21 RX ORDER — HYDROMORPHONE HYDROCHLORIDE 1 MG/ML
0.1 INJECTION, SOLUTION INTRAMUSCULAR; INTRAVENOUS; SUBCUTANEOUS
Status: DISCONTINUED | OUTPATIENT
Start: 2025-07-21 | End: 2025-07-21 | Stop reason: HOSPADM

## 2025-07-21 RX ORDER — OXYCODONE HCL 5 MG/5 ML
5 SOLUTION, ORAL ORAL
Status: COMPLETED | OUTPATIENT
Start: 2025-07-21 | End: 2025-07-21

## 2025-07-21 RX ORDER — OXYCODONE HCL 5 MG/5 ML
10 SOLUTION, ORAL ORAL
Status: COMPLETED | OUTPATIENT
Start: 2025-07-21 | End: 2025-07-21

## 2025-07-21 RX ORDER — EPHEDRINE SULFATE 50 MG/ML
5 INJECTION, SOLUTION INTRAVENOUS
Status: DISCONTINUED | OUTPATIENT
Start: 2025-07-21 | End: 2025-07-21 | Stop reason: HOSPADM

## 2025-07-21 RX ORDER — DIPHENHYDRAMINE HYDROCHLORIDE 50 MG/ML
12.5 INJECTION, SOLUTION INTRAMUSCULAR; INTRAVENOUS
Status: DISCONTINUED | OUTPATIENT
Start: 2025-07-21 | End: 2025-07-21 | Stop reason: HOSPADM

## 2025-07-21 RX ADMIN — FENTANYL CITRATE 100 MCG: 50 INJECTION, SOLUTION INTRAMUSCULAR; INTRAVENOUS at 12:12

## 2025-07-21 RX ADMIN — SCOPOLAMINE 1 PATCH: 1.5 PATCH, EXTENDED RELEASE TRANSDERMAL at 10:52

## 2025-07-21 RX ADMIN — MIDAZOLAM HYDROCHLORIDE 2 MG: 1 INJECTION, SOLUTION INTRAMUSCULAR; INTRAVENOUS at 12:06

## 2025-07-21 RX ADMIN — ACETAMINOPHEN 1000 MG: 500 TABLET, FILM COATED ORAL at 10:52

## 2025-07-21 RX ADMIN — FENTANYL CITRATE 25 MCG: 50 INJECTION, SOLUTION INTRAMUSCULAR; INTRAVENOUS at 13:20

## 2025-07-21 RX ADMIN — GABAPENTIN 200 MG: 100 CAPSULE ORAL at 10:52

## 2025-07-21 RX ADMIN — SODIUM CHLORIDE, POTASSIUM CHLORIDE, SODIUM LACTATE AND CALCIUM CHLORIDE: 600; 310; 30; 20 INJECTION, SOLUTION INTRAVENOUS at 10:51

## 2025-07-21 RX ADMIN — ROCURONIUM BROMIDE 50 MG: 10 INJECTION INTRAVENOUS at 12:12

## 2025-07-21 RX ADMIN — SUGAMMADEX 200 MG: 100 INJECTION, SOLUTION INTRAVENOUS at 12:49

## 2025-07-21 RX ADMIN — PROPOFOL 160 MG: 10 INJECTION, EMULSION INTRAVENOUS at 12:12

## 2025-07-21 RX ADMIN — PHENAZOPYRIDINE 200 MG: 200 TABLET ORAL at 10:52

## 2025-07-21 RX ADMIN — KETOROLAC TROMETHAMINE 15 MG: 15 INJECTION, SOLUTION INTRAMUSCULAR; INTRAVENOUS at 12:50

## 2025-07-21 RX ADMIN — DEXAMETHASONE SODIUM PHOSPHATE 8 MG: 4 INJECTION INTRA-ARTICULAR; INTRALESIONAL; INTRAMUSCULAR; INTRAVENOUS; SOFT TISSUE at 12:16

## 2025-07-21 RX ADMIN — SODIUM CHLORIDE, POTASSIUM CHLORIDE, SODIUM LACTATE AND CALCIUM CHLORIDE: 600; 310; 30; 20 INJECTION, SOLUTION INTRAVENOUS at 12:06

## 2025-07-21 RX ADMIN — LIDOCAINE HYDROCHLORIDE 100 MG: 20 INJECTION, SOLUTION EPIDURAL; INFILTRATION; INTRACAUDAL; PERINEURAL at 12:12

## 2025-07-21 RX ADMIN — CEFAZOLIN 2 G: 1 INJECTION, POWDER, FOR SOLUTION INTRAMUSCULAR; INTRAVENOUS at 12:12

## 2025-07-21 RX ADMIN — ONDANSETRON 4 MG: 2 INJECTION INTRAMUSCULAR; INTRAVENOUS at 12:40

## 2025-07-21 RX ADMIN — OXYCODONE HYDROCHLORIDE 5 MG: 5 SOLUTION ORAL at 13:27

## 2025-07-21 ASSESSMENT — PAIN DESCRIPTION - PAIN TYPE
TYPE: SURGICAL PAIN

## 2025-07-21 NOTE — ANESTHESIA POSTPROCEDURE EVALUATION
Patient: Florencia aLu    Procedure Summary       Date: 07/21/25 Room / Location: [facility] OR 12 / SURGERY DAY SURGERY [facility]    Anesthesia Start: 1206 Anesthesia Stop: 1258    Procedures:       URETHRAL BULKING, SLING REVISION/REMOVAL (Urethra)      REVISION OR REMOVAL OF SLING FOR FLORINDA (Vagina ) Diagnosis: (STRESS URINARY INCONTINENCE, PRIOR SLING PROCEDURE, SLING EXPOSURE)    Surgeons: Ortiz Victor M.D. Responsible Provider: Liz Dotson D.O.    Anesthesia Type: general ASA Status: 2            Final Anesthesia Type: general  Last vitals  BP   Blood Pressure: 95/58    Temp   36 °C (96.8 °F)    Pulse   71   Resp   14    SpO2   98 %      Anesthesia Post Evaluation    Patient location during evaluation: PACU  Patient participation: complete - patient participated  Level of consciousness: awake and alert    Airway patency: patent  Anesthetic complications: no  Cardiovascular status: hemodynamically stable  Respiratory status: acceptable  Hydration status: euvolemic    PONV: none          No notable events documented.     Nurse Pain Score: 0 (NPRS)

## 2025-07-21 NOTE — DISCHARGE INSTRUCTIONS
If any questions arise, call your provider.  If your provider is not available, please feel free to call the Surgical Center at [phone].    MEDICATIONS: Resume taking daily medication.  Take prescribed pain medication with food.  If no medication is prescribed, you may take non-aspirin pain medication if needed.  PAIN MEDICATION CAN BE VERY CONSTIPATING.  Take a stool softener or laxative such as senokot, pericolace, or milk of magnesia if needed.    Last pain medication given at _____________    What to Expect Post Anesthesia    Rest and take it easy for the first 24 hours.  A responsible adult is recommended to remain with you during that time.  It is normal to feel sleepy.  We encourage you to not do anything that requires balance, judgment or coordination.    FOR 24 HOURS DO NOT:  Drive, operate machinery or run household appliances.  Drink beer or alcoholic beverages.  Make important decisions or sign legal documents.    To avoid nausea, slowly advance diet as tolerated, avoiding spicy or greasy foods for the first day.  Add more substantial food to your diet according to your provider's instructions.  Babies can be fed formula or breast milk as soon as they are hungry.  INCREASE FLUIDS AND FIBER TO AVOID CONSTIPATION.    MILD FLU-LIKE SYMPTOMS ARE NORMAL.  YOU MAY EXPERIENCE GENERALIZED MUSCLE ACHES, THROAT IRRITATION, HEADACHE AND/OR SOME NAUSEA.      Post-op: What to expect after your surgery    For less-urgent matters (Monday - Friday), you may send a message through [employer] or call the general Women's Health line at [phone].     For urgent post-operative questions or concerns after hours, please call Subspecialty post op phone line [phone].    Bladder function  Try to empty your bladder (urinate) at regular intervals by sitting on the toilet and relaxing.  You may need to adjust your positioning (lean forward or back) to empty the bladder fully. It is important that you do not push or  strain to empty your bladder.     Call the surgeon at the number above if you cannot urinate. Also, call for treatment if you have signs and symptoms of a urinary tract infection, including:   Burning with urination  Bladder pain  Worsening need to urinate right away  Urine with a bad smell    If you are sent home with a catheter:   Empty the catheter bag when it becomes full. The bag should be kept at a level below your hips to drain properly. When you are asleep, the bag should dangle off the bed. and should dangle off of the bed while you are asleep. You will be called the day after you go home to schedule an office visit to test your bladder and remove the catheter.     Vaginal care:   Do not go swimming, take sitting baths, or have sexual intercourse for 6 weeks after surgery. Do not place anything in the vagina except vaginal estrogen cream, if instructed to do so by your surgeon.     Vaginal discharge and bleeding/spotting is also normal through the entire 6-week recovery. Sometimes small sutures will fall out of the vagina as they dissolve. This is normal. Contact the office with any heavy bleeding soaking through pads, bad-smelling discharge, or worsening pain.     Pain management:  Surgical pain is controlled in most patients with only non-steroidal anti-inflammatory drugs (NSAIDs, such as ibuprofen, “Advil”), and acetaminophen (Tylenol). These drugs can be taken together without interaction.     In the hospital, your nurse will give you these medications at regular intervals to both treat and to prevent pain. If these are not controlling your pain, you may ask the nurse for additional medication.     When you go home, you will also take NSAIDs and acetaminophen for pain management. I recommend the following at-home pain regimen:    Take ibuprofen 800mg three times per day, along with tylenol 1000mg three times per day, for the first 5-7 days after surgery to prevent and treat pain and inflammation.    After 5-7 days, you may take 400mg iburpfen and 500mg tylenol as-needed     Sometimes, a short course of narcotics such as oxycodone and hydromorphone is  required, but this is not routine. We do not recommend using narcotics regularly as it can lead to constipation or dizziness, and falls.     Do not drive or operate heavy machinery while using narcotics. You are unlikely to become addicted if you need to take a narcotic medication a few times within the first week of your surgery.  After the first few days to one week, your pain should decrease and you should not have pain severe enough to need narcotics. If you continue having severe pain, contact your surgeon for re-evaluation.     Bowel function  Constipation is common after surgery. This means it may take several days before having a normal bowel movement. It is important to take extra steps to keep your stools soft to avoid straining with bowel movements. Straining may damage the prolapse repair before it has healed. Most patients will be given a prescription for a stool softener (docusate) as well as a gentle laxative (Miralax or lactulose). These mediations adds water to the stool to make it easier to pass. Take them daily throughout your recovery. Hold for a day if you develop diarrhea.     Call us if you experience any repeated episodes of vomiting, worsening abdominal pain/bloating, or are unable to have a bowel movement for more than 3 days.     Activity restrictions  During the first 6 weeks avoid any type of heavy lifting that requires you to strain.  Gentle walking is good exercise. Start with about 10 minutes a day when you feel ready and build up gradually. Avoid repetitive squatting or bending at the waist. Avoid any fitness-type training, aerobics, etc. for at least 6 weeks after surgery. Generally, you will need 4-6 weeks off work. This period may be longer if you have a very physical job.    Return to sex  When your surgeon clears you to  resume sex (if desired), begin slowly and to use enough lubrication to help ease the discomfort. Because your vagina and pelvis have been re-structured, and it can take time to get used to sex after surgery. As scar tissue softens over time you will feel less discomfort. If discomfort does not go away, contact the office to schedule an exam and to discuss other options. In some cases, your surgeon may prescribe a low dose of vaginal estrogen to help with vaginal dryness and pain that may happen with sex.

## 2025-07-21 NOTE — ANESTHESIA PROCEDURE NOTES
Airway    Date/Time: 7/21/2025 12:14 PM    Performed by: Liz Dotson D.O.  Authorized by: Liz Dotson D.O.    Location:  OR  Urgency:  Elective  Indications for Airway Management:  Anesthesia      Spontaneous Ventilation: absent    Sedation Level:  Deep  Preoxygenated: Yes    Patient Position:  Sniffing  Mask Difficulty Assessment:  1 - vent by mask  Final Airway Type:  Endotracheal airway  Final Endotracheal Airway:  ETT  Cuffed: Yes    Technique Used for Successful ETT Placement:  Direct laryngoscopy    Insertion Site:  Oral  Blade Type:  Lin  Laryngoscope Blade/Videolaryngoscope Blade Size:  3  ETT Size (mm):  6.5  Measured from:  Teeth  ETT to Teeth (cm):  19  Placement Verified by: auscultation and capnometry    Cormack-Lehane Classification:  Grade I - full view of glottis  Number of Attempts at Approach:  1

## 2025-07-21 NOTE — OR NURSING
1256 To PACU from OR by a gurnitish; pt sleeping, respirations spontaneous and nonlabored.     1320 pt rouses to voice, c/o 6-7/10 vaginal pain, PRN analgesic given as ordered, VSS.    1341 pt resting, VSS. Pt tolerating sips of water.     1411 pt awake, VSS, pain is at tolerable level. 600 ml of urine emptied from bejarano catheter.     1430 activevoid trail perfomed; bladder backfilled via gravity w/ 300 ml of sterile water.    1440 pt voided 300 ml w/ out any difficulty. Pt meets criteria for stage 2, VSS, pt a/o x4, and pain is at tolerable level.     1540 Discharged to care of family after uneventful stay at PACU 2

## 2025-07-21 NOTE — OP REPORT
OPERATIVE REPORT     Name: Florencia Lau    : 1980    MRN: [id number]       PRE-OP DIAGNOSIS:   Exposure of transobturator sling mesh  Frequent UTI  Stress incontinence, recurrent            POST-OP DIAGNOSIS:   Exposure of transobturator sling mesh  Frequent UTI  Stress incontinence, recurrent            PROCEDURE:   Excision of transobturator sling mesh (07243)  Urethral bulking (24150)            SURGEON:   Ortiz Victor MD - Primary  Sujatha Diehl APRN - Assist              ANESTHESIA: General            FINDINGS:       - Exam under anesthesia: Exposed mesh noted on the right paraurethral sulcus, 2cm, blue, friable but overall healthy surrounding tissue. Extensive examination on the left did not visualize or palpate any mesh. Uterine descent to -3, asymptomatic, not addressed.    - Cystourethroscopy: Performed after completion of relevant procedures. Normal appearing urothelium without lesions, masses, sutures, or perforations. Ureteral orifices noted in the normal orthotopic position, with brisk jets of urine bilaterally. Urethra was normal in appearance without evidence of stricture, perforation, or diverticulum.      ESTIMATED BLOOD LOSS: 10 mL            DRAINS: 14 Fr Ritter                   SPECIMENS: None - patient requested to take mesh home with her, not sent for gross path.             IMPLANTS: Bulkamid hydrogel            COMPLICATIONS: None            DISPOSITION: Discharge            CONDITION: Stable            INDICATION FOR SURGERY:     Florencia Lau is a 45 y.o. P1 with transobturator sling mesh exposure, frequent UTI, stress incontinence. She has a significant sling mesh exposure despite prior excision in the office.  This is likely causing her vaginal discharge as well as recurrent UTIs I recommend excision in the OR with full excision of the vaginal portion of the mass bilaterally.  This procedure was described to her in detail including the recovery and what to expect  afterwards.  She previous had significant stress incontinence which went away with the sling, now has recurred after the sling has been cut. She denies any urinary urgency, and empties her bladder well. She is not a good candidate for mesh reimplantation due to her exposure at this time, so I recommend urethral bulking. Success rates reviewed with her in detail, and she understands she may require second treatment or other treatments down the line. She opts to proceed with: Excision of exposed transobturator sling mesh, urethral bulking, and all indicated procedures. Benefits of surgery were reviewed, including functional outcomes (bladder/bowel/sexual). Risks of surgery were  also discussed including anesthesia, bleeding, infection, damage to surrounding organs (bladder, ureter, urethra, bowel, blood vessel, nerves), possible blood transfusion (rare), persistent/recurrent incontinence, recurrent mesh exposure/erosion, pain, dyspareunia, transient voiding dysfunction requiring catheterization, possible need for sling release/revision, new/worsening urinary incontinence. All questions were answered and consent was signed and witnessed.        TECHNIQUE:   I spoke with the patient in the preoperative holding area, where again risks, benefits, indications, and alternatives were reviewed and informed consent was obtained.  She was then transferred to the operative suite, where she was identified, procedure was confirmed.  She was placed in dorsal supine position, given general endotracheal anesthesia without difficulty.  She was then placed in a high dorsal lithotomy position  in yellowfin stirrups with all pressure points padded.  She was prepared and draped in usual sterile fashion with vaginal chlorhexidine prep with all pressure points padded.  An appropriate time-out was performed, where patient was identified, procedure was confirmed, and the operative team was introduced.  It was also confirmed that patient did  receive cefazolin 2 g IV for prophylaxis, and she also received DVT prophylaxis with sequential compression devices bilaterally throughout the case.  At this time, exam under anesthesia revealed findings noted above. A 16-Upper sorbian Ritter catheter was then placed in the patient's  bladder for drainage throughout the procedure, and a [place] retractor was placed for retraction.      Attention was then turned to the excision of the transobturator sling mesh.  Extensive exam under anesthesia was performed and only an exposure on the patient's right-hand side was noted, approximately 2 cm with healthy surrounding tissue.  This tracked into the obturator internus fascia in a normal distribution.  On the patient's left side, an extensive exam was performed with no palpable mesh seen, visible, or exposed.  Decision was made not to perform significant dissection in this area due to the risk of morbidity and lower chance of benefit.  The right side of the mesh was then grasped and placed on tension and the surrounding tissue was dissected away to the level of the obturator internus fascia.  The edges were then cleaned out with Bovie electrocautery so they were fresh.  The mesh then transected at the level of the obturator internus fascia and handed off the field.  The area was copiously irrigated with gentamicin solution.  The defect was then closed with a series of 2-0 Vicryl interrupted sutures with excellent hemostasis noted.    The urethral bulking procedure then proceeded in the following fashion: The bulkamid urethroscope was then placed into the bladder which was inspected. The bulkamid needle was then advanced into the needle channel on the rotatable sheath until the tip of the sheath was adjacent to the bladder neck. The sheath was rotated to a 7 oclock position and the needle was then extended until he 2cm romulo was visible. The bulkamid system was then retracted until the tip of the needle was resting at the bladder  neck. The needle was then retracted into the sheath and approximately 1.5cm from the bladder neck the Bulkamid system was pressed parallel against the urethral wall and the needle advanced into the submucosal tissue  ensuring that the bevel of the needle was facing the lumen. The needle was then advanced 0.5cm  and the bulkamind hydrogel was then injected until the cushion was visible and reached the midline of the urethral lumen. The needle was retracted back into the sheath and the scope rotated to the next injection site. Subsequent injections were performed at 5 o'clock, 2 o'clock and 10 o'clock until all 4 cushions met in the midline. 2 mL total bulkamid was used.  At the 7 o'clock position, some bleeding was noted, resolved with holding pressure.  A 14 [place] catheter was placed for use during void trial in PACU. Excellent hemostasis noted.        All counts were correct.  The patient was then wakened from general anesthesia easily, and brought to the PACU in stable condition. Anticipate discharge home later today.         Ortiz Victor MD, FACOG  Urogynecology and Reconstructive Pelvic Surgery  Department of Obstetrics and Gynecology  [facility]

## 2025-07-21 NOTE — ANESTHESIA TIME REPORT
Anesthesia Start and Stop Event Times       Date Time Event    7/21/2025 1155 Ready for Procedure     1206 Anesthesia Start     1258 Anesthesia Stop          Responsible Staff  07/21/25      Name Role Begin End    Liz Dotson D.O. Anesth 1206 1258          Overtime Reason:  no overtime (within assigned shift)    Comments:

## 2025-07-21 NOTE — ANESTHESIA PREPROCEDURE EVALUATION
Case: [id number] Anesthesia Start Date/Time: 07/21/25 1206    Procedures:       URETHRAL BULKING, SLING REVISION/REMOVAL (Urethra)      REVISION OR REMOVAL OF SLING FOR FLORINDA (Vagina )    Anesthesia type: general    Pre-op diagnosis: STRESS URINARY INCONTINENCE, PRIOR SLING PROCEDURE, SLING EXPOSURE    Location: [facility] OR 12 / SURGERY DAY SURGERY [facility]    Surgeons: Ortiz Victor M.D.            Relevant Problems   ANESTHESIA (within normal limits)      PULMONARY (within normal limits)      NEURO (within normal limits)      CARDIAC (within normal limits)      GI (within normal limits)       (within normal limits)      ENDO (within normal limits)      OB (within normal limits)      Other   (positive) Chronic pain syndrome   (positive) Erosion of Obtryx vaginal graft (HCC)   (positive) Fibromyalgia   (positive) Inflammatory arthritis   (positive) Overdose   (positive) Recurrent UTI   (positive) Recurrent major depressive disorder, in partial remission (HCC)   (positive) Stress incontinence   (positive) Suicidal intent   (positive) Vaginal atrophy     45F s/f urethral bulking, sling removal. Hx fibromyalgia on gabapentin.     Denies chest pain, sob, gerd sx, abd pain, n/v, previous anesthetic issues.  METS>4.   NPO> 8 hrs.     Physical Exam    Airway   Mallampati: II  TM distance: >3 FB  Neck ROM: full       Cardiovascular - normal exam  Rhythm: regular  Rate: normal    (-) murmur     Dental - normal exam           Pulmonary - normal examBreath sounds clear to auscultation     Abdominal    Neurological - normal exam                   Anesthesia Plan    ASA 2       Plan - general       Airway plan will be ETT          Induction: intravenous    Postoperative Plan: Postoperative administration of opioids is intended.    Pertinent diagnostic labs and testing reviewed    Informed Consent:    Anesthetic plan and risks discussed with patient.

## 2025-07-22 ENCOUNTER — TELEPHONE (OUTPATIENT)
Dept: OBGYN | Facility: CLINIC | Age: 45
End: 2025-07-22
Payer: COMMERCIAL

## 2025-07-22 NOTE — TELEPHONE ENCOUNTER
Post-operative phone call    I called Ms. Lau and left a message. If she has any questions or concerns she can call our office or send a message through [employer]. She has her follow up visit  scheduled for 08/19/2025.       JAIDEN [place]

## 2025-07-30 ENCOUNTER — PATIENT MESSAGE (OUTPATIENT)
Dept: GYNECOLOGY | Facility: CLINIC | Age: 45
End: 2025-07-30
Payer: COMMERCIAL

## 2025-07-30 ENCOUNTER — TELEPHONE (OUTPATIENT)
Dept: OBGYN | Facility: CLINIC | Age: 45
End: 2025-07-30
Payer: COMMERCIAL

## 2025-07-30 RX ORDER — ONDANSETRON 4 MG/1
4 TABLET, FILM COATED ORAL EVERY 4 HOURS PRN
Qty: 12 TABLET | Refills: 0 | Status: SHIPPED | OUTPATIENT
Start: 2025-07-30 | End: 2025-08-05

## 2025-07-30 NOTE — TELEPHONE ENCOUNTER
Patient's dental office called in because patient is there for her routine Perio maintenance cleaning and they wanted to ensure this would be ok since patient recently had surgery with  7/21/25.  is not currently in office, I consulted with  who states it is ok to have this done.

## 2025-07-31 ENCOUNTER — PATIENT MESSAGE (OUTPATIENT)
Dept: GYNECOLOGY | Facility: CLINIC | Age: 45
End: 2025-07-31
Payer: COMMERCIAL

## 2025-08-01 ENCOUNTER — PATIENT MESSAGE (OUTPATIENT)
Dept: GYNECOLOGY | Facility: CLINIC | Age: 45
End: 2025-08-01
Payer: COMMERCIAL

## 2025-08-19 ENCOUNTER — APPOINTMENT (OUTPATIENT)
Dept: GYNECOLOGY | Facility: CLINIC | Age: 45
End: 2025-08-19
Payer: COMMERCIAL

## 2025-08-19 ENCOUNTER — PATIENT MESSAGE (OUTPATIENT)
Dept: GYNECOLOGY | Facility: CLINIC | Age: 45
End: 2025-08-19

## 2025-09-16 ENCOUNTER — APPOINTMENT (OUTPATIENT)
Dept: GYNECOLOGY | Facility: CLINIC | Age: 45
End: 2025-09-16
Payer: COMMERCIAL

## (undated) DEVICE — GOWN SURGEONS X-LARGE - DISP. (30/CA)

## (undated) DEVICE — SLEEVE, VASO, THIGH, MED

## (undated) DEVICE — SODIUM CHL IRRIGATION 0.9% 1000ML (12EA/CA)

## (undated) DEVICE — GOWN WARMING STANDARD FLEX - (30/CA)

## (undated) DEVICE — NEEDLE W/FACET TIP DULL VERSION W/STIMULATION CABLE SONOPLEX 21G X 4 (10/EA)"

## (undated) DEVICE — MASK ANESTHESIA ADULT  - (100/CA)

## (undated) DEVICE — Device

## (undated) DEVICE — ELECTRODE DUAL RETURN W/ CORD - (50/PK)

## (undated) DEVICE — GLOVE SZ 7.5 BIOGEL PI MICRO - PF LF (50PR/BX)

## (undated) DEVICE — GLOVE BIOGEL PI INDICATOR SZ 8.5 SURGICAL PF LF - (50PR/BX 4BX/CA)

## (undated) DEVICE — KIT ROOM DECONTAMINATION

## (undated) DEVICE — BLADE SURGICAL #15 - (50/BX 3BX/CA)

## (undated) DEVICE — SET LEADWIRE 5 LEAD BEDSIDE DISPOSABLE ECG (1SET OF 5/EA)

## (undated) DEVICE — KIT  I.V. START (100EA/CA)

## (undated) DEVICE — SPLINT PLASTER 5 IN X 30 IN - (50EA/BX 6BX/CA)

## (undated) DEVICE — TUBING CLEARLINK DUO-VENT - C-FLO (48EA/CA)

## (undated) DEVICE — PAD PREP 24 X 48 CUFFED - (100/CA)

## (undated) DEVICE — GLOVE SZ 6.5 BIOGEL PI MICRO - PF LF (50PR/BX)

## (undated) DEVICE — HEAD HOLDER JUNIOR/ADULT

## (undated) DEVICE — CHLORAPREP 26 ML APPLICATOR - ORANGE TINT(25/CA)

## (undated) DEVICE — CATHETER IV 20 GA X 1-1/4 ---SURG.& SDS ONLY--- (50EA/BX)

## (undated) DEVICE — TRAY FOLEY CATHETER STATLOCK 16FR SURESTEP  (10EA/CA)

## (undated) DEVICE — GLOVE BIOGEL PI INDICATOR SZ 7.0 SURGICAL PF LF - (50/BX 4BX/CA)

## (undated) DEVICE — PACK LOWER EXTREMITY - (2/CA)

## (undated) DEVICE — SUTURE 0 VICRYL PLUS CT-2 - 8 X 18 INCH (12/BX)

## (undated) DEVICE — LACTATED RINGERS INJ 1000 ML - (14EA/CA 60CA/PF)

## (undated) DEVICE — CLOSURE SKIN STRIP 1/2 X 4 IN - (STERI STRIP) (50/BX 4BX/CA)

## (undated) DEVICE — DRAPE VAGINAL BIB W/ POUCH (10EA/CA)

## (undated) DEVICE — SUCTION INSTRUMENT YANKAUER BULBOUS TIP W/O VENT (50EA/CA)

## (undated) DEVICE — BLADE SURGICAL #11 - (50/BX)

## (undated) DEVICE — PADDING CAST 4 IN X 4 YDS - SOF-ROLL (12RL/BG 6BG/CT)

## (undated) DEVICE — NEEDLE SAFETY 18 GA X 1 1/2 IN (100EA/BX)

## (undated) DEVICE — WATER IRRIGATION STERILE 1000ML (12EA/CA)

## (undated) DEVICE — PADDING CAST 6 IN STERILE - 6 X 4 YDS (24/CA)

## (undated) DEVICE — SUTURE GENERAL

## (undated) DEVICE — STOCKINET BIAS 6 IN STERILE - (20/CA)

## (undated) DEVICE — SET EXTENSION WITH 2 PORTS (48EA/CA) ***PART #2C8610 IS A SUBSTITUTE*****

## (undated) DEVICE — PROTECTOR ULNA NERVE - (36PR/CA)

## (undated) DEVICE — SCRUB SOLUTION EXIDINE 4% 40Z - 48/CS CHLORAHEXADINE GLUCONATE

## (undated) DEVICE — JELLY SURGILUBE STERILE TUBE 4.25 OZ (1/EA)

## (undated) DEVICE — TOWEL STOP TIMEOUT SAFETY FLAG (40EA/CA)

## (undated) DEVICE — SUTURE 4-0 CHROMIC S-2 (12PK/BX)

## (undated) DEVICE — GLOVE BIOGEL SZ 7.5 SURGICAL PF LTX - (50PR/BX 4BX/CA)

## (undated) DEVICE — CANISTER SUCTION 3000ML MECHANICAL FILTER AUTO SHUTOFF MEDI-VAC NONSTERILE LF DISP (40EA/CA)

## (undated) DEVICE — SENSOR SPO2 NEO LNCS ADHESIVE (20/BX) SEE USER NOTES

## (undated) DEVICE — CANISTER SUCTION RIGID RED 1500CC (40EA/CA)

## (undated) DEVICE — ELECTRODE 850 FOAM ADHESIVE - HYDROGEL RADIOTRNSPRNT (50/PK)

## (undated) DEVICE — NEPTUNE 4 PORT MANIFOLD - (20/PK)

## (undated) DEVICE — TRAY SRGPRP PVP IOD WT PRP - (20/CA)

## (undated) DEVICE — GLOVE SZ 8 BIOGEL PI MICRO - PF LF (50PR/BX)

## (undated) DEVICE — SENSOR OXIMETER ADULT SPO2 RD SET (20EA/BX)

## (undated) DEVICE — DRESSING 3X3 ADAPTIC GAUZE - (50EA/CT)

## (undated) DEVICE — CATHETER URETHRAL FOLEY SILICONE OD14 FR 10 ML (10EA/CA)

## (undated) DEVICE — DERMABOND ADVANCED - (12EA/BX)

## (undated) DEVICE — SODIUM CHL. IRRIGATION 0.9% 3000ML (4EA/CA 65CA/PF)

## (undated) DEVICE — SUTURE 3-0 MONOCRYL PLUS PS-1 - 27 INCH (36/BX)

## (undated) DEVICE — DRAPE 36X28IN RAD CARM BND BG - (25/CA) O

## (undated) DEVICE — TUBE CONNECTING SUCTION - CLEAR PLASTIC STERILE 72 IN (50EA/CA)

## (undated) DEVICE — SUTURE 2-0 VICRYL PLUS SH - 8 X 18 INCH (12/BX)

## (undated) DEVICE — TUBING PUMP WITH CONNECTOR REDEUCE (1EA)

## (undated) DEVICE — TOURNIQUET CUFF 24 X 4 ONE PORT - STERILE (10/BX)

## (undated) DEVICE — DETERGENT RENUZYME PLUS 10 OZ PACKET (50/BX)

## (undated) DEVICE — GLOVE BIOGEL PI INDICATOR SZ 8.0 SURGICAL PF LF -(50/BX 4BX/CA)

## (undated) DEVICE — TUBING PATIENT W/CONNECTOR REDEUCE (1EA)

## (undated) DEVICE — MASK OXYGEN VNYL ADLT MED CONC WITH 7 FOOT TUBING - (50EA/CA)

## (undated) DEVICE — GLOVE BIOGEL SZ 7 SURGICAL PF LTX - (50PR/BX 4BX/CA)

## (undated) DEVICE — CANISTER SUCTION 3000ML MECHANICAL FILTER AUTO SHUTOFF MEDI-VAC NONSTERILE LF DISP  (40EA/CA)

## (undated) DEVICE — KIT ANESTHESIA W/CIRCUIT & 3/LT BAG W/FILTER (20EA/CA)

## (undated) DEVICE — SOD. CHL. INJ. 0.9% 1000 ML - (14EA/CA 60CA/PF)

## (undated) DEVICE — STERI STRIP COMPOUND BENZOIN - TINCTURE 0.6ML WITH APPLICATOR (40EA/BX)

## (undated) DEVICE — DRAPE LARGE 3 QUARTER - (20/CA)

## (undated) DEVICE — WATER IRRIG. STER 3000 ML - (4/CA)

## (undated) DEVICE — DRESSING 3X8 ADAPTIC GAUZE - NON-ADHERING (36/PK 6PK/BX)

## (undated) DEVICE — SUTURE 2-0 VICRYL PLUS CT-2 - 27 INCH (36/BX)

## (undated) DEVICE — GLOVE BIOGEL INDICATOR SZ 8 SURGICAL PF LTX - (50/BX 4BX/CA)

## (undated) DEVICE — NEEDLE SAFETY PRO. 18 GA 1-1/2 IN (10BX/CA 50/BX)**WHEN THESE ARE GONE ORDER #65241**

## (undated) DEVICE — TRAY CATHETER FOLEY URINE METER W/STATLOCK 350ML (10EA/CA)

## (undated) DEVICE — BANDAGE ELASTIC 6 IN X 5 YDS - LATEX FREE (10/BX)

## (undated) DEVICE — GLOVE BIOGEL PI INDICATOR SZ 6.5 SURGICAL PF LF - (50/BX 4BX/CA)

## (undated) DEVICE — PAD SANITARY 11IN MAXI IND WRAPPED (12EA/PK 24PK/CA)

## (undated) DEVICE — GLOVE BIOGEL INDICATOR SZ 7.5 SURGICAL PF LTX - (50PR/BX 4BX/CA)

## (undated) DEVICE — SUTURE 4-0 MONOCRYL PLUS PS-2 - 27 INCH (36/BX)

## (undated) DEVICE — KIT SURGIFLO W/OUT THROMBIN - (6EA/CA)

## (undated) DEVICE — SUTURE 4-0 CHROMIC GUT - 18 INCH G-3 D/A (12/BX)

## (undated) DEVICE — WRAP CO-FLEX 4IN X 5YD STERIL - SELF-ADHERENT (18/CA)

## (undated) DEVICE — GLOVE BIOGEL PI ORTHO SZ 6 1/2 SURGICAL PF LF (40PR/BX)

## (undated) DEVICE — SPONGE GAUZESTER 4 X 4 4PLY - (128PK/CA)

## (undated) DEVICE — BANDAGE ELASTIC STERILE MATRIX 6 X 10 (20EA/CA)

## (undated) DEVICE — SUTURE 2-0 VICRYL PLUS CT-1 - 8 X 18 INCH(12/BX)

## (undated) DEVICE — SYRINGE 30 ML LL (56/BX)

## (undated) DEVICE — ELECTRODE EMG SURFACE

## (undated) DEVICE — SUTURE 3-0 ETHILON FS-1 - (36/BX) 30 INCH

## (undated) DEVICE — MASK, LARYNGEAL AIRWAY #4

## (undated) DEVICE — BANDAGE ELASTIC 6 HONEYCOMB - 6X5YD LF (20/CA)"

## (undated) DEVICE — GLOVE SZ 7 BIOGEL PI MICRO - PF LF (50PR/BX 4BX/CA)

## (undated) DEVICE — SLEEVE VASO DVT COMPRESSION CALF MED - (10PR/CA)

## (undated) DEVICE — DRAPE C-ARM LARGE 41IN X 74 IN - (10/BX 2BX/CA)

## (undated) DEVICE — SHAVER 4.0 BARREL FORMULA (5EA/BX)

## (undated) DEVICE — SHAVER 3.5 AGGRESSIVE + FORMLA (5EA/SP)

## (undated) DEVICE — SET IRRIGATION CYSTOSCOPY TUBE L80 IN (20EA/CA)

## (undated) DEVICE — SUTURE 2-0 MONOCRYL CT-1

## (undated) DEVICE — PAD SANITARY 11IN MAXI IND WRAPPED  (12EA/PK 24PK/CA)

## (undated) DEVICE — SHAVER 3.5 SM JT AGGRES.MEN. (5EA/BX)

## (undated) DEVICE — DRESSING PETROLEUM GAUZE 5 X 9" (50EA/BX 4BX/CA)"

## (undated) DEVICE — CANNULA O2 COMFORT SOFT EAR ADULT 7 FT TUBING (50/CA)

## (undated) DEVICE — DRESSING ABDOMINAL PAD STERILE 8 X 10" (360EA/CA)"